# Patient Record
Sex: FEMALE | Race: WHITE
[De-identification: names, ages, dates, MRNs, and addresses within clinical notes are randomized per-mention and may not be internally consistent; named-entity substitution may affect disease eponyms.]

---

## 2019-12-05 ENCOUNTER — HOSPITAL ENCOUNTER (OUTPATIENT)
Dept: HOSPITAL 76 - DI | Age: 69
Discharge: HOME | End: 2019-12-05
Attending: INTERNAL MEDICINE
Payer: MEDICARE

## 2019-12-05 DIAGNOSIS — Q21.1: Primary | ICD-10-CM

## 2019-12-05 DIAGNOSIS — G47.31: ICD-10-CM

## 2019-12-05 DIAGNOSIS — J44.9: ICD-10-CM

## 2019-12-05 DIAGNOSIS — E03.9: ICD-10-CM

## 2019-12-05 DIAGNOSIS — Z87.891: ICD-10-CM

## 2019-12-05 DIAGNOSIS — M85.89: ICD-10-CM

## 2019-12-05 DIAGNOSIS — N95.8: ICD-10-CM

## 2019-12-05 DIAGNOSIS — Z12.31: Primary | ICD-10-CM

## 2019-12-05 PROCEDURE — 94729 DIFFUSING CAPACITY: CPT

## 2019-12-05 PROCEDURE — 77063 BREAST TOMOSYNTHESIS BI: CPT

## 2019-12-05 PROCEDURE — 71046 X-RAY EXAM CHEST 2 VIEWS: CPT

## 2019-12-05 PROCEDURE — 77067 SCR MAMMO BI INCL CAD: CPT

## 2019-12-05 PROCEDURE — 77080 DXA BONE DENSITY AXIAL: CPT

## 2019-12-05 PROCEDURE — 76536 US EXAM OF HEAD AND NECK: CPT

## 2019-12-05 PROCEDURE — 94010 BREATHING CAPACITY TEST: CPT

## 2019-12-06 NOTE — ULTRASOUND REPORT
Reason:  ATRIAL SEPTAL DEFECT, HYPOTHYROIDISM

Procedure Date:  12/05/2019   

Accession Number:  [id number] / [id number]                    

Procedure:  US  - Head or Neck Soft Tissue CPT Code:  

 

***Final Report***

 

 

FULL RESULT:

 

 

EXAM:

THYROID ULTRASOUND

 

EXAM DATE: 12/5/2019 09:46 AM.

 

CLINICAL HISTORY: Atrial septal defect, hypothyroidism.

 

COMPARISON: None.

 

TECHNIQUE: Real time sonographic imaging of the thyroid was performed by 

the sonographer. Multiple representative static images were saved for 

review.

 

FINDINGS:

THYROID GLAND:

Right Lobe: 2.5 x 1.2 x 0.8 cm, volume 1.1 cc. Markedly heterogeneous and 

lobular.

Right Lobe Nodules: None.

 

Left Lobe: 2.2 x 0.6 x 0.8 cm, volume 0.5 cc. Markedly heterogeneous and 

lobular.

Left Lobe Nodules: None.

 

Isthmus: 0.3 cm AP.

Isthmic Nodules:

1. Echogenic with peripheral hypoechoic rim, 0.9 x 0.9 x 0.5 cm.

 

LYMPH NODES:

No adenopathy demonstrated in the central or lateral compartment.

 

OTHER: None.

 

IMPRESSION:

1. Small heterogeneous and lobular thyroid gland mostly likely the result 

of thyroid disease and consistent with clinical history of 

hypothyroidism. 0.9 cm isthmic nodule which does not meet criteria for 

fine-needle aspiration biopsy. Therefore, continued surveillance is 

recommended with follow-up in 12-24 months.

 

Management recommendations are based on 2015 American Thyroid Association 

Management Guidelines for Adult Patients with Thyroid Nodules and 

Differentiated Thyroid Cancer.

 

[facility]

## 2019-12-06 NOTE — XRAY REPORT
Reason:  ATRIAL SEPTAL DEFECT, HYPOTHYROIDISM

Procedure Date:  12/05/2019   

Accession Number:  [id number] / [id number]                    

Procedure:  XR  - Chest 2 View X-Ray CPT Code:  29771

 

***Final Report***

 

 

FULL RESULT:

 

 

EXAM:

CHEST RADIOGRAPHY

 

EXAM DATE: 12/5/2019 10:42 AM.

 

CLINICAL HISTORY: ATRIAL SEPTAL DEFECT, HYPOTHYROIDISM.

 

COMPARISON: None.

 

TECHNIQUE: 2 views.

 

FINDINGS:

Lungs/Pleura: No focal opacities evident. No pleural effusion. No 

pneumothorax. Normal volumes.

 

Mediastinum: Heart and mediastinal contours are unremarkable.

 

Other: None.

IMPRESSION: Normal 2-view chest radiography.

 

[facility]

## 2019-12-09 NOTE — DEXA REPORT
Reason:  POSTMENOPAUSAL

Procedure Date:  12/05/2019   

Accession Number:  [id number] / [id number]                    

Procedure:  DEX - Dexa Spine and/or Hip CPT Code:  

 

***Final Report***

 

 

FULL RESULT:

 

 

EXAM: Dexa Spine and/or Hip

 

DATE: 12/5/2019 11:07 AM

 

CLINICAL HISTORY: POSTMENOPAUSAL

 

TECHNIQUE: Dual energy x-ray absorptiometry (DXA) was performed on a [employer] System.  Regions measured are the AP Spine, femoral neck, and if 

needed forearm.

 

COMPARISON: None.

 

In accordance with the International Society for Clinical Densitometry 

(ISCD) guidelines, data from previous exams may be reanalyzed using 

current recommendations and techniques.  This is done to allow a more 

accurate basis for comparison with the current study.

 

FINDINGS: The data for the lumbar spine is as follows:

 

                 BMD (g/cm/cm)         T-SCORE          Z-SCORE

 REGION

 L1                   0.899                   -1.9                   -1.5

 L2                   0.965                   -2.0                   -1.5

 L3                   1.025                   -1.5                   -1.0

 L4                   1.106                   -0.8                   -0.3

TOTAL              1.010                   -1.4                 -1.0

 

NOTE: All evaluable vertebrae are used for classification

 

The data for the hip is as follows:

 

                 BMD (g/cm/cm)         T-SCORE          Z-SCORE

 REGION

 Neck             0.704                       -2.4              -1.6

TOTAL            0.761                       -2.0            -1.5

 

NOTE: The femoral neck or total proximal femur, whichever is lowest, is 

used for classification.

 

IMPRESSION: THE WHO CLASSIFICATION BASED ON THE INTERNATIONAL REFERENCE 

STANDARD IS OSTEOPENIA.  THE FRACTURE RISK IS INCREASED.

 

RECOMMENDATION: Patients with diagnosis of osteoporosis or osteopenia 

should have regular bone mineral density assessment.  For those eligible 

for Medicare, routine testing is allowed once every 2 years.  Testing 

frequency can be increased for patients who have rapidly progressing 

disease or for those who are receiving medical therapy to restore bone 

mass.

 

COMMENT:  World Health Organization (WHO) definitions for osteoporosis 

and osteopenia:

NORMAL BMD:  T-score at -1.0 or higher, fracture risk is low

OSTEOPENIA BMD:  T-score between -1.0 and -2.5, fracture risk is 

increased.

OSTEOPOROSIS BMD:  T-score at -2.5 or lower, fracture risk is high.

 

National Osteoporosis Foundation recommends:

1. Obtain adequate dietary calcium (at least 1200 mg per day) and vitamin 

D (400-800 international units per day).

2. Participate, as appropriate, in regular weightbearing and 

muscle-strengthening exercise.

3. Avoid tobacco use and reduce alcohol and caffeine intake.

4. For more detailed information see the website at www.NOF.org.

## 2020-11-23 ENCOUNTER — HOSPITAL ENCOUNTER (OUTPATIENT)
Dept: HOSPITAL 76 - DI | Age: 70
Discharge: HOME | End: 2020-11-23
Attending: FAMILY MEDICINE
Payer: MEDICARE

## 2020-11-23 DIAGNOSIS — E04.1: Primary | ICD-10-CM

## 2020-11-23 PROCEDURE — 76536 US EXAM OF HEAD AND NECK: CPT

## 2020-11-23 NOTE — ULTRASOUND REPORT
PROCEDURE:  Head or Neck Soft Tissue

 

INDICATIONS:  THYROID NODULE

 

TECHNIQUE:  

Real-time scanning was performed of the thyroid gland, with image documentation.  

 

COMPARISON:  Thyroid ultrasound 12/5/2019

 

FINDINGS:  

Right:  Thyroid lobe measures 2.1 x 0.8 x 0.9 cm, and is homogeneous in echotexture.  

Left:  Thyroid lobe measures 2.0 x 0.5 x 0.6 cm, and is homogenous in echotexture.  

Isthmus:  3  mm thick.  

 

Nodule number:  One

Location:  Isthmus

Size:  1.0 x 0.8 x 0.5  cm compared to 0.9 x 0.9 x 0.5 cm. 

Composition:  Follow-up  

Echogenicity:  Hyperechoic   

Shape:  wider than tall.

Margins:  Smooth

Echogenic foci:  None

Total points:  1

ACR TI-RADS category:  1

 

IMPRESSION:  

1. Stable appearance of the isthmus lesion since 12/5/2019. Based on recommendations below given jazmin
gory 1 classification, no additional follow-up is recommended.

 

ACR TI-RADS definitions and recommendations:  

TI-RADS 1 (benign): 0 points.  FNA not needed. 

TI-RADS 2 (not suspicious): 2 points.  FNA not needed. 

TI-RADS 3 (mildly suspicious): 3 points. 

?  FNA if 2.5 cm or larger, follow up if 1.5 cm or larger (at 1, 3, and 5 years). 

TI-RADS 4 (moderately suspicious): 4-6 points.  

?  FNA if 1.5 cm or larger, follow up if 1 cm or larger (at 1, 2, 3, and 5 years).  

TI-RADS 5 (highly suspicious): 7 points or more.  

?  FNA if 1 cm or larger, follow up if 0.5 cm or larger (every year for 5 years).  

 

Reviewed by: Deb Nicolas MD on 11/23/2020 5:18 PM [facility]

Approved by: Deb Nicolas MD on 11/23/2020 5:18 PM [facility]

 

 

Station ID:  [id number]

## 2021-11-23 ENCOUNTER — HOSPITAL ENCOUNTER (OUTPATIENT)
Dept: HOSPITAL 76 - SDS | Age: 71
Discharge: HOME | End: 2021-11-23
Attending: SURGERY
Payer: MEDICARE

## 2021-11-23 VITALS — DIASTOLIC BLOOD PRESSURE: 80 MMHG | SYSTOLIC BLOOD PRESSURE: 160 MMHG

## 2021-11-23 DIAGNOSIS — K21.9: ICD-10-CM

## 2021-11-23 DIAGNOSIS — E78.5: ICD-10-CM

## 2021-11-23 DIAGNOSIS — E66.9: ICD-10-CM

## 2021-11-23 DIAGNOSIS — G89.4: ICD-10-CM

## 2021-11-23 DIAGNOSIS — Z79.84: ICD-10-CM

## 2021-11-23 DIAGNOSIS — K57.30: ICD-10-CM

## 2021-11-23 DIAGNOSIS — K59.00: ICD-10-CM

## 2021-11-23 DIAGNOSIS — Z79.01: ICD-10-CM

## 2021-11-23 DIAGNOSIS — I10: ICD-10-CM

## 2021-11-23 DIAGNOSIS — Z86.010: ICD-10-CM

## 2021-11-23 DIAGNOSIS — I69.951: ICD-10-CM

## 2021-11-23 DIAGNOSIS — M54.50: ICD-10-CM

## 2021-11-23 DIAGNOSIS — Z87.891: ICD-10-CM

## 2021-11-23 DIAGNOSIS — Z12.11: Primary | ICD-10-CM

## 2021-11-23 DIAGNOSIS — K64.8: ICD-10-CM

## 2021-11-23 DIAGNOSIS — Z79.899: ICD-10-CM

## 2021-11-23 DIAGNOSIS — K64.4: ICD-10-CM

## 2021-11-23 DIAGNOSIS — E11.42: ICD-10-CM

## 2021-11-23 DIAGNOSIS — Z79.891: ICD-10-CM

## 2021-11-23 NOTE — ANESTHESIA
Pre-Anesthesia VS, & Labs





- Diagnosis





colon polyps





- Procedure





Colonoscopy


Vital Signs: 





                                        











Temp Pulse Resp BP Pulse Ox


 


 36.9 C   96   10 L  189/95 H  95 


 


 11/23/21 08:28  11/23/21 08:28  11/23/21 08:28  11/23/21 08:28  11/23/21 08:28











Height: 5 ft 6 in


Weight (kg): 106.3 kg


Body Mass Index: 37.8


BMI Classification: Obese





- NPO


>8 hours





- Pregnancy


Is Patient Pregnant?: No





Home Medications and Allergies


Home Medications: 


Ambulatory Orders





Metformin HCl [Metformin ER Osmotic] 500 mg PO BID 11/23/21 











                                        





Atorvastatin Calcium 40 mg PO DAILY 02/12/21 


Furosemide [Lasix] 20 mg PO DAILY 02/12/21 


Levothyroxine Sodium [Levothyroxine] 150 mcg PO DAILY 02/12/21 


Methadone [Methadone Hcl] 5 mg PO BID 02/12/21 


Pregabalin [Lyrica] 150 mg PO BID 02/12/21 


Warfarin [Coumadin] 5 mg PO DAILY 02/12/21 


amLODIPine [Norvasc] 5 mg PO DAILY 02/12/21 


lisinopriL [Prinivil] 10 mg PO DAILY 02/12/21 


tiZANidine [Zanaflex] 4 mg PO BID 02/12/21 








Allergies/Adverse Reactions: 


                                    Allergies











Allergy/AdvReac Type Severity Reaction Status Date / Time


 


No Known Drug Allergies Allergy   Verified 11/23/21 08:48














Anes History & Medical History





- Anesthetic History


Anesthesia Complications: reports: No previous complications





- Medical History


Cardiovascular: reports: Hypertension


Pulmonary: reports: CPAP use


Gastrointestinal: reports: None


Urinary: reports: None


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 

outside)


Endocrine/Autoimmune: reports: Type 2 diabetes (new diagnosis)


Smoking Status: Former smoker (quit 2013)


History of Cancer?: No





- Surgical History


Gynecologic: reports: Hysterectomy, Other (laparoscopy for endometriosis)


Orthopedic: reports: Other (wrist ORIF)





Exam


Mouth Opening: Greater than 4 Fingerbreadths


Neck Mobility: Reduced


Mallampati classification: III


Thyromental Distance: 4-6 cm


Respiratory: Lungs clear


Cardiovascular: Regular rate





Plan


Anesthesia Type: Total IV


Consent for Procedure(s) Verified and Reviewed: Yes


Code Status: Attempt Resuscitation


ASA classification: 3-Severe systemic disease


Is this case an emergency?: No

## 2021-11-23 NOTE — ANESTHESIA POST OP EVALUATION
Anesthesia Post Eval





- Post Anesthesia Eval


Vitals: 





                                Last Vital Signs











Temp  36.5 C   11/23/21 09:55


 


Pulse  71   11/23/21 09:55


 


Resp  12   11/23/21 09:55


 


BP  155/80 H  11/23/21 09:55


 


Pulse Ox  96   11/23/21 09:55











CV Function Including HR & BP: Stable


Pain Control: Satisfactory


Nausea & Vomiting: Negative


Mental Status: Baseline


Respiratory Status: Airway Patent


Hydration Status: Satisfactory


Anesthesia Complications: None

## 2022-04-11 ENCOUNTER — HOSPITAL ENCOUNTER (OUTPATIENT)
Dept: HOSPITAL 76 - DI | Age: 72
Discharge: HOME | End: 2022-04-11
Attending: PHYSICIAN ASSISTANT
Payer: MEDICARE

## 2022-04-11 DIAGNOSIS — Z13.820: ICD-10-CM

## 2022-04-11 DIAGNOSIS — Z12.2: ICD-10-CM

## 2022-04-11 DIAGNOSIS — M85.89: ICD-10-CM

## 2022-04-11 DIAGNOSIS — Z78.0: Primary | ICD-10-CM

## 2022-04-11 DIAGNOSIS — Z87.891: ICD-10-CM

## 2022-04-11 DIAGNOSIS — Z12.2: Primary | ICD-10-CM

## 2022-04-11 DIAGNOSIS — N95.8: ICD-10-CM

## 2022-04-11 NOTE — DEXA REPORT
PROCEDURE: Dexa Spine and/or Hip

 

INDICATIONS: POST MENOPAUSAL

 

TECHNIQUE: Dual energy x-ray absorptiometry (DXA) was performed on a [employer] System.  Regions measur
ed are the AP Spine, femoral neck, and if needed forearm.

 

COMPARISON: December 5, 2019

  

FINDINGS:

 

Lumbar Spine: 

Bone Mineral Density   1.092 g/cm/cm,T score  -0.7, normal; 8.1% change.

 

Left Hip:

Bone Mineral Density  0.750 g/cm/cm,T score -2.0, osteopenia; -1.4% change

 

Left Femoral Neck:

Bone Mineral Density  0.712 g/cm/cm, T score -2.3, osteopenia

 

 

(T score greater or equal to -1.0: NORMAL)

(T score from -1.1 to -2.4: OSTEOPENIA)

(T score less than or equal to -2.5 to: OSTEOPOROSIS)

 

Impression: 

Bone mineral density as detailed above.

 

Patients with diagnosis of osteoporosis or osteopenia should have regular bone mineral density assess
ment.  For those eligible for Medicare, routine testing is allowed once every 2 years.  Testing frequ
ency can be increased for patients who have rapidly progressing disease or for those who are receivin
g medical therapy to restore bone mass.

 

Reviewed by: Rohan Rojas MD on 4/11/2022 2:28 PM PDT

Approved by: Rohan Rojas MD on 4/11/2022 2:28 PM PDT

 

 

Station ID:  [id number]

## 2022-04-11 NOTE — CT REPORT
PROCEDURE:  Low Dose Lung Cancer Screen

 

INDICATIONS:  FORMER SMOKER

 

TECHNIQUE:  

Noncontrast low-dose images were acquired from the pulmonary apices to the posterior costophrenic ang
les.  Multiplanar MIP reformats were then acquired.  For radiation dose reduction, the following was 
used:  automated exposure control, adjustment of mA and/or kV according to patient size.

 

COMPARISON:  None.

 

FINDINGS:  

Image quality:  Excellent.  

 

Lungs and pleura:  2 mm pleural-based pulmonary nodule, right upper lobe, image 127/5.

 

Mediastinum:  Heart size is normal.  No pericardial effusion.  Moderate coronary artery calcification
s. No mediastinal adenopathy by size criteria.  Thoracic aorta and central pulmonary arteries are nor
mal in size.  Esophagus is normal in caliber.  No hiatal hernia.  

 

Bones and chest wall:  No suspicious bony lesions.  No vertebral body compression fractures.  No axil
brennon or supraclavicular adenopathy by size criteria.  Thyroid is not well seen.

 

Abdomen:  Visualized upper abdomen solid organs and bowel loops appear normal in the absence of contr
ast.  

 

IMPRESSION:  

 

1. LungRads category 1: Negative. No nodules and/or definitely benign nodules.

 

2. Annual low-dose noncontrast CT of the chest is recommended for lung cancer screening.

 

3. Clinically significant or potentially clinically significant findings (nonlung cancer): Moderate c
oronary artery calcifications.

 

 

CLINICAL RECOMMENDATION STATEMENTS:

In patients <35 years with an ITN detected on CT, MRI, or extrathyroidal ultrasound, the Committee re
commends further evaluation with dedicated thyroid ultrasound if the nodule is "e1 cm and has no susp
icious imaging features, and if the patient has normal life expectancy.

In patients "e35 years with an ITN detected on CT, MRI, or extrathyroidal ultrasound, the Committee r
ecommends further evaluation with dedicated thyroid ultrasound if the nodule is "e1.5 cm and has no s
uspicious imaging features, and if the patient has normal life expectancy. (ACR, 2014)

 

Reviewed by: David Gutierrez MD on 4/11/2022 2:42 PM PDT

Approved by: David Gutierrez MD on 4/11/2022 2:42 PM PDT

 

 

Station ID:  [id number]

## 2022-05-31 ENCOUNTER — HOSPITAL ENCOUNTER (OUTPATIENT)
Dept: HOSPITAL 76 - DI.N | Age: 72
Discharge: HOME | End: 2022-05-31
Attending: PHYSICIAN ASSISTANT
Payer: MEDICARE

## 2022-05-31 DIAGNOSIS — R92.8: ICD-10-CM

## 2022-05-31 DIAGNOSIS — Z12.31: Primary | ICD-10-CM

## 2022-06-01 NOTE — MAMMOGRAPHY REPORT
BILATERAL DIGITAL SCREENING MAMMOGRAM 3D/2D: 5/31/2022

CLINICAL: Routine screening.  

 

Comparison is made to exams dated:  12/5/2019 mammogram and 8/30/2018 mammogram - [facility].  The tissue of both breasts is predominantly fatty.  

 

No significant masses, calcifications, or other findings are seen in either breast.  

 

There is a new focal asymmetry in the right breast at the 11:00 position.

 

There are three new asymmetries in the left breast. 

IMPRESSION: INCOMPLETE: NEEDS ADDITIONAL IMAGING EVALUATION

New bilateral asymmetries, for which diagnostic mammogram and ultrasound are recommended.

 

 

This exam was interpreted at Station ID: [id number].  

 

NOTE: For mammograms, a report in lay terms will be sent to the patient. Approximately 15% of breast 
malignancies will not be visualized mammographically. In the management of a palpable breast mass, a 
negative mammogram must not discourage biopsy of a clinically suspicious lesion.

 

Electronically Signed By: Frankie Avila M.D.          

jr/:5/31/2022 15:17:34  

 

 

 

ACR BI-RADS Category 0: Incomplete 3340F

PARENCHYMAL PATTERN: (F) - The breast(s) demonstrate(s) diffuse fatty replacement.

BI-RADS CATEGORY: (0) - 0

Mammo and US

[id number]

Immediate follow-up

LATERALITY: (B)

## 2023-02-15 ENCOUNTER — HOSPITAL ENCOUNTER (OUTPATIENT)
Dept: HOSPITAL 76 - DI | Age: 73
Discharge: HOME | End: 2023-02-15
Attending: STUDENT IN AN ORGANIZED HEALTH CARE EDUCATION/TRAINING PROGRAM
Payer: MEDICARE

## 2023-02-15 DIAGNOSIS — N63.20: ICD-10-CM

## 2023-02-15 DIAGNOSIS — N63.10: ICD-10-CM

## 2023-02-15 DIAGNOSIS — R92.8: Primary | ICD-10-CM

## 2023-02-16 NOTE — MAMMOGRAPHY REPORT
BILATERAL DIGITAL DIAGNOSTIC MAMMOGRAM 3D/2D: 2/15/2023

CLINICAL: Patient returns for a 6 month follow up of bilateral breasts.  

 

Comparison is made to exams dated:  7/14/2022 mammogram, 5/31/2022 mammogram, 12/5/2019 mammogram, an
d 8/30/2018 mammogram - [facility].  

 

Both breasts are almost entirely fatty (category a/<25% glandular tissue).  

 

There is a stable focal asymmetry in the right breast middle depth central to the nipple seen on the 
craniocaudal view only.  

There are multiple stable focal asymmetries in the left breast inferior lateral quadrant middle depth
.  

 

In retrospect, these bilateral findings are not significantly changed from the 2019 mammogram.

 

No other significant masses or calcifications are seen in either breast.  

 

IMPRESSION: BENIGN

There is no mammographic evidence of malignancy. A 1 year screening mammogram is recommended.  

 

Based on the Tyrer Cuzick model (a risk assessment model) the patients lifetime risk is 2.2% and her
 10 year risk is 1.4%. According to the ACR, ACS, and NCCN guidelines, an annual breast MRI exam chelsey
g with mammogram is recommended if the patients lifetime risk is 20% or greater.

 

 

This exam was interpreted at Station ID: [id number].  

 

NOTE: For mammograms, a report in lay terms will be sent to the patient. Approximately 15% of breast 
malignancies will not be visualized mammographically. In the management of a palpable breast mass, a 
negative mammogram must not discourage biopsy of a clinically suspicious lesion.

 

Electronically Signed By: Amber Dawson M.D.          

lk/:2/15/2023 10:52:10  

 

 

 

ACR BI-RADS Category 2: Benign Finding(s) 3342F

PARENCHYMAL PATTERN: (F) - The breast(s) demonstrate(s) diffuse fatty replacement.

BI-RADS CATEGORY: (2) - 2

RECOMMENDATION: (ANNUAL)  - Recommend routine annual screening mammography.

[id number]

1 year screening

LATERALITY: (B)

## 2023-05-07 ENCOUNTER — HOSPITAL ENCOUNTER (INPATIENT)
Dept: HOSPITAL 76 - ED | Age: 73
LOS: 10 days | Discharge: HOSPICE | DRG: 436 | End: 2023-05-17
Attending: INTERNAL MEDICINE | Admitting: INTERNAL MEDICINE
Payer: MEDICARE

## 2023-05-07 DIAGNOSIS — R09.02: ICD-10-CM

## 2023-05-07 DIAGNOSIS — Z87.891: ICD-10-CM

## 2023-05-07 DIAGNOSIS — Z79.899: ICD-10-CM

## 2023-05-07 DIAGNOSIS — Z20.822: ICD-10-CM

## 2023-05-07 DIAGNOSIS — C80.1: ICD-10-CM

## 2023-05-07 DIAGNOSIS — I45.10: ICD-10-CM

## 2023-05-07 DIAGNOSIS — E86.0: ICD-10-CM

## 2023-05-07 DIAGNOSIS — Z79.84: ICD-10-CM

## 2023-05-07 DIAGNOSIS — R53.1: ICD-10-CM

## 2023-05-07 DIAGNOSIS — E03.9: ICD-10-CM

## 2023-05-07 DIAGNOSIS — C78.7: ICD-10-CM

## 2023-05-07 DIAGNOSIS — R16.0: ICD-10-CM

## 2023-05-07 DIAGNOSIS — E11.42: ICD-10-CM

## 2023-05-07 DIAGNOSIS — I10: ICD-10-CM

## 2023-05-07 DIAGNOSIS — R41.82: ICD-10-CM

## 2023-05-07 DIAGNOSIS — G89.29: ICD-10-CM

## 2023-05-07 DIAGNOSIS — Z79.890: ICD-10-CM

## 2023-05-07 DIAGNOSIS — E11.9: ICD-10-CM

## 2023-05-07 DIAGNOSIS — G47.33: ICD-10-CM

## 2023-05-07 DIAGNOSIS — Z66: ICD-10-CM

## 2023-05-07 DIAGNOSIS — R74.01: ICD-10-CM

## 2023-05-07 DIAGNOSIS — N17.9: ICD-10-CM

## 2023-05-07 DIAGNOSIS — I95.1: ICD-10-CM

## 2023-05-07 DIAGNOSIS — R79.1: ICD-10-CM

## 2023-05-07 DIAGNOSIS — Z79.01: ICD-10-CM

## 2023-05-07 DIAGNOSIS — R10.9: Primary | ICD-10-CM

## 2023-05-07 DIAGNOSIS — G93.40: ICD-10-CM

## 2023-05-07 DIAGNOSIS — I69.351: ICD-10-CM

## 2023-05-07 LAB
ALBUMIN DIAFP-MCNC: 3 G/DL (ref 3.2–5.5)
ALBUMIN/GLOB SERPL: 0.7 {RATIO} (ref 1–2.2)
ALP SERPL-CCNC: 311 IU/L (ref 42–121)
ALT SERPL W P-5'-P-CCNC: 74 IU/L (ref 10–60)
AMPHET UR QL SCN: NEGATIVE
ANION GAP SERPL CALCULATED.4IONS-SCNC: 18 MMOL/L (ref 6–13)
AST SERPL W P-5'-P-CCNC: 206 IU/L (ref 10–42)
B PARAPERT DNA SPEC QL NAA+PROBE: NOT DETECTED
B PERT DNA SPEC QL NAA+PROBE: NOT DETECTED
BARBITURATES UR QL SCN>300 NG/ML: NEGATIVE
BASE EXCESS BLDV CALC-SCNC: -2.4 MMOL/L
BASOPHILS NFR BLD AUTO: 0.1 10^3/UL (ref 0–0.1)
BASOPHILS NFR BLD AUTO: 0.6 %
BENZODIAZ UR QL SCN: NEGATIVE
BILIRUB BLD-MCNC: 1.7 MG/DL (ref 0.2–1)
BUN SERPL-MCNC: 41 MG/DL (ref 6–20)
C PNEUM DNA NPH QL NAA+NON-PROBE: NOT DETECTED
CALCIUM UR-MCNC: 8.7 MG/DL (ref 8.5–10.3)
CHLORIDE SERPL-SCNC: 96 MMOL/L (ref 101–111)
CLARITY UR REFRACT.AUTO: (no result)
CO2 BLDV-SCNC: 24.8 MMOL/L (ref 24–29)
CO2 SERPL-SCNC: 26 MMOL/L (ref 21–32)
COCAINE UR-SCNC: NEGATIVE UMOL/L
CREAT SERPLBLD-SCNC: 2.3 MG/DL (ref 0.4–1)
EOSINOPHIL # BLD AUTO: 0 10^3/UL (ref 0–0.7)
EOSINOPHIL NFR BLD AUTO: 0 %
ERYTHROCYTE [DISTWIDTH] IN BLOOD BY AUTOMATED COUNT: 14.4 % (ref 12–15)
ETHANOL BLD-MCNC: < 5 MG/DL
FLUAV RNA RESP QL NAA+PROBE: NOT DETECTED
GFRSERPLBLD MDRD-ARVRAT: 21 ML/MIN/{1.73_M2} (ref 89–?)
GLOBULIN SER-MCNC: 4.1 G/DL (ref 2.1–4.2)
GLUCOSE SERPL-MCNC: 209 MG/DL (ref 70–100)
GLUCOSE UR QL STRIP.AUTO: NEGATIVE MG/DL
HAEM INFLU B DNA SPEC QL NAA+PROBE: NOT DETECTED
HCO3 BLDMV-SCNC: 23.4 MMOL/L (ref 23–28)
HCOV 229E RNA SPEC QL NAA+PROBE: NOT DETECTED
HCOV HKU1 RNA UPPER RESP QL NAA+PROBE: NOT DETECTED
HCOV NL63 RNA ASPIRATE QL NAA+PROBE: NOT DETECTED
HCOV OC43 RNA SPEC QL NAA+PROBE: NOT DETECTED
HCT VFR BLD AUTO: 38.8 % (ref 37–47)
HGB UR QL STRIP: 12.3 G/DL (ref 12–16)
HMPV AG SPEC QL: NOT DETECTED
HPIV1 RNA NPH QL NAA+PROBE: NOT DETECTED
HPIV2 SPEC QL CULT: NOT DETECTED
HPIV3 AB TITR SER CF: NOT DETECTED {TITER}
HPIV4 RNA SPEC QL NAA+PROBE: NOT DETECTED
INR PPP: > 10 (ref 0.8–1.2)
KETONES UR QL STRIP.AUTO: NEGATIVE MG/DL
LYMPHOCYTES # SPEC AUTO: 0.7 10^3/UL (ref 1.5–3.5)
LYMPHOCYTES NFR BLD AUTO: 5.6 %
M PNEUMO DNA SPEC QL NAA+PROBE: NOT DETECTED
MCH RBC QN AUTO: 30.1 PG (ref 27–31)
MCHC RBC AUTO-ENTMCNC: 31.7 G/DL (ref 32–36)
MCV RBC AUTO: 94.9 FL (ref 81–99)
METHADONE UR QL SCN: POSITIVE
METHAMPHET UR QL SCN: NEGATIVE
MONOCYTES # BLD AUTO: 1 10^3/UL (ref 0–1)
MONOCYTES NFR BLD AUTO: 8.7 %
NEUTROPHILS # BLD AUTO: 10 10^3/UL (ref 1.5–6.6)
NEUTROPHILS # SNV AUTO: 11.9 X10^3/UL (ref 4.8–10.8)
NEUTROPHILS NFR BLD AUTO: 84.4 %
NITRITE UR QL STRIP.AUTO: NEGATIVE
NRBC # BLD AUTO: 0 /100WBC
NRBC # BLD AUTO: 0 X10^3/UL
OPIATES UR QL SCN: NEGATIVE
PCO2 BLDV: 44.1 MMHG (ref 41–51)
PDW BLD AUTO: 11.3 FL (ref 7.9–10.8)
PH BLDV: 7.34 [PH] (ref 7.31–7.41)
PH UR STRIP.AUTO: 5.5 PH (ref 5–7.5)
PLAT MORPH BLD: (no result)
PLATELET # BLD: 290 10^3/UL (ref 130–450)
PLATELET BLD QL SMEAR: (no result)
PO2 BLDV: 45.6 MMHG (ref 25–47)
POTASSIUM SERPL-SCNC: 3.6 MMOL/L (ref 3.5–5)
PROT SPEC-MCNC: 7.1 G/DL (ref 6.7–8.2)
PROT UR STRIP.AUTO-MCNC: 100 MG/DL
PROTHROM ACT/NOR PPP: 112.3 SECS (ref 9.9–12.6)
RBC # UR STRIP.AUTO: (no result) /UL
RBC # URNS HPF: (no result) /HPF (ref 0–5)
RBC MAR: 4.09 10^6/UL (ref 4.2–5.4)
RBC MORPH BLD: (no result)
RSV RNA RESP QL NAA+PROBE: NOT DETECTED
RV+EV RNA SPEC QL NAA+PROBE: NOT DETECTED
SAO2 DF BLDV: 78.9 % (ref 60–80)
SARS-COV-2 RNA PNL SPEC NAA+PROBE: NOT DETECTED
SODIUM SERPLBLD-SCNC: 140 MMOL/L (ref 135–145)
SP GR UR STRIP.AUTO: 1.02 (ref 1–1.03)
SQUAMOUS URNS QL MICRO: (no result)
THC UR QL SCN: NEGATIVE
UROBILINOGEN UR QL STRIP.AUTO: (no result) E.U./DL
UROBILINOGEN UR STRIP.AUTO-MCNC: NEGATIVE MG/DL
VOLATILE DRUGS POS SERPL SCN: (no result)
WBC # UR MANUAL: (no result) /HPF (ref 0–5)
WBC MORPH BLD: (no result)

## 2023-05-07 PROCEDURE — 71260 CT THORAX DX C+: CPT

## 2023-05-07 PROCEDURE — 82803 BLOOD GASES ANY COMBINATION: CPT

## 2023-05-07 PROCEDURE — 82140 ASSAY OF AMMONIA: CPT

## 2023-05-07 PROCEDURE — 84132 ASSAY OF SERUM POTASSIUM: CPT

## 2023-05-07 PROCEDURE — 36415 COLL VENOUS BLD VENIPUNCTURE: CPT

## 2023-05-07 PROCEDURE — 70450 CT HEAD/BRAIN W/O DYE: CPT

## 2023-05-07 PROCEDURE — 97163 PT EVAL HIGH COMPLEX 45 MIN: CPT

## 2023-05-07 PROCEDURE — 82105 ALPHA-FETOPROTEIN SERUM: CPT

## 2023-05-07 PROCEDURE — 84484 ASSAY OF TROPONIN QUANT: CPT

## 2023-05-07 PROCEDURE — 80306 DRUG TEST PRSMV INSTRMNT: CPT

## 2023-05-07 PROCEDURE — 83036 HEMOGLOBIN GLYCOSYLATED A1C: CPT

## 2023-05-07 PROCEDURE — 93005 ELECTROCARDIOGRAM TRACING: CPT

## 2023-05-07 PROCEDURE — 87086 URINE CULTURE/COLONY COUNT: CPT

## 2023-05-07 PROCEDURE — 81001 URINALYSIS AUTO W/SCOPE: CPT

## 2023-05-07 PROCEDURE — 87077 CULTURE AEROBIC IDENTIFY: CPT

## 2023-05-07 PROCEDURE — 97116 GAIT TRAINING THERAPY: CPT

## 2023-05-07 PROCEDURE — 83880 ASSAY OF NATRIURETIC PEPTIDE: CPT

## 2023-05-07 PROCEDURE — 71045 X-RAY EXAM CHEST 1 VIEW: CPT

## 2023-05-07 PROCEDURE — 86304 IMMUNOASSAY TUMOR CA 125: CPT

## 2023-05-07 PROCEDURE — 83735 ASSAY OF MAGNESIUM: CPT

## 2023-05-07 PROCEDURE — 86301 IMMUNOASSAY TUMOR CA 19-9: CPT

## 2023-05-07 PROCEDURE — 36556 INSERT NON-TUNNEL CV CATH: CPT

## 2023-05-07 PROCEDURE — 51702 INSERT TEMP BLADDER CATH: CPT

## 2023-05-07 PROCEDURE — 87040 BLOOD CULTURE FOR BACTERIA: CPT

## 2023-05-07 PROCEDURE — 74170 CT ABD WO CNTRST FLWD CNTRST: CPT

## 2023-05-07 PROCEDURE — 87150 DNA/RNA AMPLIFIED PROBE: CPT

## 2023-05-07 PROCEDURE — 80320 DRUG SCREEN QUANTALCOHOLS: CPT

## 2023-05-07 PROCEDURE — 97167 OT EVAL HIGH COMPLEX 60 MIN: CPT

## 2023-05-07 PROCEDURE — 87633 RESP VIRUS 12-25 TARGETS: CPT

## 2023-05-07 PROCEDURE — 83605 ASSAY OF LACTIC ACID: CPT

## 2023-05-07 PROCEDURE — 86300 IMMUNOASSAY TUMOR CA 15-3: CPT

## 2023-05-07 PROCEDURE — 97530 THERAPEUTIC ACTIVITIES: CPT

## 2023-05-07 PROCEDURE — 86901 BLOOD TYPING SEROLOGIC RH(D): CPT

## 2023-05-07 PROCEDURE — 84439 ASSAY OF FREE THYROXINE: CPT

## 2023-05-07 PROCEDURE — 96374 THER/PROPH/DIAG INJ IV PUSH: CPT

## 2023-05-07 PROCEDURE — 96361 HYDRATE IV INFUSION ADD-ON: CPT

## 2023-05-07 PROCEDURE — 76705 ECHO EXAM OF ABDOMEN: CPT

## 2023-05-07 PROCEDURE — 97112 NEUROMUSCULAR REEDUCATION: CPT

## 2023-05-07 PROCEDURE — 87181 SC STD AGAR DILUTION PER AGT: CPT

## 2023-05-07 PROCEDURE — 97535 SELF CARE MNGMENT TRAINING: CPT

## 2023-05-07 PROCEDURE — 84100 ASSAY OF PHOSPHORUS: CPT

## 2023-05-07 PROCEDURE — 85610 PROTHROMBIN TIME: CPT

## 2023-05-07 PROCEDURE — 84443 ASSAY THYROID STIM HORMONE: CPT

## 2023-05-07 PROCEDURE — 80053 COMPREHEN METABOLIC PANEL: CPT

## 2023-05-07 PROCEDURE — 74177 CT ABD & PELVIS W/CONTRAST: CPT

## 2023-05-07 PROCEDURE — 86900 BLOOD TYPING SEROLOGIC ABO: CPT

## 2023-05-07 PROCEDURE — 82330 ASSAY OF CALCIUM: CPT

## 2023-05-07 PROCEDURE — 80048 BASIC METABOLIC PNL TOTAL CA: CPT

## 2023-05-07 PROCEDURE — 99285 EMERGENCY DEPT VISIT HI MDM: CPT

## 2023-05-07 PROCEDURE — 82378 CARCINOEMBRYONIC ANTIGEN: CPT

## 2023-05-07 PROCEDURE — 74176 CT ABD & PELVIS W/O CONTRAST: CPT

## 2023-05-07 PROCEDURE — 85025 COMPLETE CBC W/AUTO DIFF WBC: CPT

## 2023-05-07 PROCEDURE — 99291 CRITICAL CARE FIRST HOUR: CPT

## 2023-05-07 RX ADMIN — INSULIN LISPRO SCH UNIT: 100 INJECTION, SOLUTION INTRAVENOUS; SUBCUTANEOUS at 22:09

## 2023-05-07 RX ADMIN — SODIUM CHLORIDE, PRESERVATIVE FREE SCH ML: 5 INJECTION INTRAVENOUS at 21:27

## 2023-05-07 RX ADMIN — SODIUM CHLORIDE SCH MLS/HR: 9 INJECTION, SOLUTION INTRAVENOUS at 21:27

## 2023-05-07 NOTE — XRAY REPORT
PROCEDURE:  Chest 1 View X-Ray

 

INDICATIONS:  HYPOXEMIA

 

TECHNIQUE:  One view of the chest was acquired.  

 

COMPARISON:  Chest x-ray 10/17/2022

 

FINDINGS:  

 

Surgical changes and devices:  None.  

 

Lungs and pleura:  No pleural effusions or pneumothorax.  Lungs are clear.  

 

Mediastinum:  Mediastinal contours appear normal.  Heart size is mildly enlarged..  

 

Bones and chest wall:  No suspicious bony lesions.  Overlying soft tissues appear unremarkable.  

 

 

IMPRESSION:  

 

No acute cardiopulmonary process.

 

 

 

Reviewed by: Deb Nicolas MD on 5/7/2023 6:28 PM PDT

Approved by: Deb Nicolas MD on 5/7/2023 6:28 PM PDT

 

 

Station ID:  [id number]

## 2023-05-07 NOTE — ED PHYSICIAN DOCUMENTATION
PD HPI ALTERED MENTAL STATUS





- Stated complaint


Stated Complaint: DEHYDRATED,WEAK,ABD PX





- Chief complaint


Chief Complaint: Neuro





- History obtained from


History obtained from: Family





- Additional information


Additional information: 





72-year-old woman with history of strokes, septal defect on lifelong warfarin, 

type 2 diabetes, peripheral neuropathy, hysterectomy presents for the evaluation

of abdominal pain and altered mental status.  She presents by private vehicle 

accompanied by her .  He provides all the history because she is fairly 

encephalopathic.  He states that about 4 to 5 days ago started to complain of 

diarrhea and abdominal pain.  Had a fever at the beginning but not in the last 

couple of days.  Today she became progressively confused and has not been 

talking in the last few hours.





PD PAST MEDICAL HISTORY





- Past Medical History


Cardiovascular: Hypertension


Respiratory: CPAP use


Neuro: CVA (times three right sided weakness, uses cane when walks outside)


Endocrine/Autoimmune: Type 2 diabetes (new diagnosis)


GI: None


: None


Musculoskeletal: Chronic back pain





- Past Surgical History


General: Other


Ortho: Other (wrist ORIF)


/GYN: Hysterectomy, Other (laparoscopy for endometriosis)





- Present Medications


Home Medications: 


                                Ambulatory Orders











 Medication  Instructions  Recorded  Confirmed


 


Atorvastatin Calcium 40 mg PO DAILY 02/12/21 11/23/21


 


Furosemide [Lasix] 20 mg PO DAILY 02/12/21 11/23/21


 


Levothyroxine Sodium 150 mcg PO DAILY 02/12/21 11/23/21





[Levothyroxine]   


 


Methadone [Methadone Hcl] 5 mg PO BID 02/12/21 11/23/21


 


Pregabalin [Lyrica] 150 mg PO BID 02/12/21 11/23/21


 


Warfarin [Coumadin] 5 mg PO DAILY 02/12/21 11/23/21


 


amLODIPine [Norvasc] 5 mg PO DAILY 02/12/21 11/23/21


 


lisinopriL [Prinivil] 10 mg PO DAILY 02/12/21 11/23/21


 


tiZANidine [Zanaflex] 4 mg PO BID 02/12/21 11/23/21


 


Metformin HCl [Metformin  mg PO BID 11/23/21 11/23/21





Osmotic]   


 


Ondansetron Odt [Zofran] 4 mg TL Q6H PRN #10 tablet 10/17/22 














- Allergies


Allergies/Adverse Reactions: 


                                    Allergies











Allergy/AdvReac Type Severity Reaction Status Date / Time


 


No Known Drug Allergies Allergy   Verified 10/17/22 14:56














- Social History


Smoking Status: Former smoker (quit 2013)





PD ED PE NORMAL





- Vitals


Vital signs reviewed: Yes





- General


General: Other (I am able to get her to respond, she has slow simple answers to 

questions and is alert and oriented to person only.  She endorses mild abdominal

 pain as her only complaint.)





- HEENT


HEENT: PERRL, EOMI





- Neck


Neck: Supple, no meningeal sign, No bony TTP





- Cardiac


Cardiac: RRR, No murmur





- Respiratory


Respiratory: No respiratory distress, Clear bilaterally (Anteriorly)





- Abdomen


Abdomen: Other (Mild diffuse tenderness with more significant right upper 

quadrant tenderness)





- Derm


Derm: Normal color, Warm and dry





- Extremities


Extremities: No edema, No calf tenderness / cord





- Neuro


Neuro: No motor deficit, No sensory deficit


Eye Opening: Spontaneous


Motor: Obeys Commands


Verbal: Confused


GCS Score: 14





Results





- Vitals


Vitals: 


                               Vital Signs - 24 hr











  05/07/23 05/07/23 05/07/23





  17:55 17:57 18:27


 


Temperature 37 C 37.0 C 


 


Heart Rate 109 H 109 H 88


 


Respiratory 16 16 14





Rate   


 


Blood Pressure 141/120 H 141/120 H 152/100 H


 


O2 Saturation 89 L 89 L 96


 


If not protocol   2





: Oxygen Flow,   





liters/minute   














  05/07/23 05/07/23 05/07/23





  18:30 18:35 19:35


 


Temperature   


 


Heart Rate 82 82 73


 


Respiratory 14 12 17





Rate   


 


Blood Pressure 100/50 L 100/50 L 99/70


 


O2 Saturation 98 99 96


 


If not protocol 2  





: Oxygen Flow,   





liters/minute   








                                     Oxygen











O2 Source                      Room air


 


Oxygen Flow Rate               2

















- EKG (time done)


  ** 1901


EKG releavant findings:: 


EKG personally interpreted by author of this note. Relevant findings are: 





Rate: Rate (enter#) (81)


Rhythm: NSR


Axis: Normal


Intervals: RBBB


Ischemia: Normal ST segments


Compare to prior EKG: Old EKG unavailable


Computer interpretation: Agree with computer





- Labs


Labs: 


                                Laboratory Tests











  05/07/23 05/07/23 05/07/23





  18:36 18:39 18:39


 


WBC   11.9 H 


 


RBC   4.09 L 


 


Hgb   12.3 


 


Hct   38.8 


 


MCV   94.9 


 


MCH   30.1 


 


MCHC   31.7 L 


 


RDW   14.4 


 


Plt Count   290 


 


MPV   11.3 H 


 


Neut # (Auto)   10.0 H 


 


Lymph # (Auto)   0.7 L 


 


Mono # (Auto)   1.0 


 


Eos # (Auto)   0.0 


 


Baso # (Auto)   0.1 


 


Absolute Nucleated RBC   0.00 


 


Nucleated RBC %   0.0 


 


Manual Slide Review   Indicated 


 


WBC Morphology   NORMAL APPEARANCE 


 


Platelet Estimate   NORMAL (130-450,000) 


 


Platelet Morphology   NORMAL APPEARANCE 


 


RBC Morph Micro Appear   NORMAL APPEARANCE 


 


PT   


 


INR   


 


VBG pH   


 


VBG pCO2   


 


VBG pO2   


 


VBG HCO3   


 


VBG Total CO2   


 


VBG O2 Saturation   


 


VBG Base Excess   


 


Sodium    140


 


Potassium    3.6


 


Chloride    96 L


 


Carbon Dioxide    26


 


Anion Gap    18.0 H


 


BUN    41 H


 


Creatinine    2.3 H


 


Estimated GFR (MDRD)    21 L


 


Glucose    209 H


 


Lactic Acid   


 


Calcium    8.7


 


Total Bilirubin    1.7 H


 


AST    206 H


 


ALT    74 H


 


Alkaline Phosphatase    311 H


 


Total Protein    7.1


 


Albumin    3.0 L


 


Globulin    4.1


 


Albumin/Globulin Ratio    0.7 L


 


TSH   


 


Urine Color   


 


Urine Clarity   


 


Urine pH   


 


Ur Specific Gravity   


 


Urine Protein   


 


Urine Glucose (UA)   


 


Urine Ketones   


 


Urine Occult Blood   


 


Urine Nitrite   


 


Urine Bilirubin   


 


Urine Urobilinogen   


 


Ur Leukocyte Esterase   


 


Urine RBC   


 


Urine WBC   


 


Ur Squamous Epith Cells   


 


Amorphous Sediment   


 


Urine Bacteria   


 


Urine Culture Comments   


 


Nasal Adenovirus (PCR)  NOT DETECTED  


 


Nasal B. parapertussis DNA (PCR)  NOT DETECTED  


 


Nasal Coronavir 229E PCR  NOT DETECTED  


 


Nasal Coronavir HKU1 PCR  NOT DETECTED  


 


Nasal Coronavir NL63 PCR  NOT DETECTED  


 


Nasal Coronavir OC43 PCR  NOT DETECTED  


 


Nasal Enterovir/Rhinovir PCR  NOT DETECTED  


 


Nasal Influenza B PCR  NOT DETECTED  


 


Nasal Influenza A PCR  NOT DETECTED  


 


Nasal Parainfluen 1 PCR  NOT DETECTED  


 


Nasal Parainfluen 2 PCR  NOT DETECTED  


 


Nasal Parainfluen 3 PCR  NOT DETECTED  


 


Nasal Parainfluen 4 PCR  NOT DETECTED  


 


Nasal RSV (PCR)  NOT DETECTED  


 


Nasal B.pertussis DNA PCR  NOT DETECTED  


 


Nasal C.pneumoniae (PCR)  NOT DETECTED  


 


Tab Human Metapneumo PCR  NOT DETECTED  


 


Nasal M.pneumoniae (PCR)  NOT DETECTED  


 


Nasal SARS-CoV-2 (PCR)  NOT DETECTED  


 


Urine Opiates Screen   


 


Ur Oxycodone Screen   


 


Urine Methadone Screen   


 


Ur Propoxyphene Screen   


 


Ur Barbiturates Screen   


 


Ur Tricyclics Screen   


 


Ur Phencyclidine Scrn   


 


Ur Amphetamine Screen   


 


U Methamphetamines Scrn   


 


U Benzodiazepines Scrn   


 


Urine Cocaine Screen   


 


U Cannabinoids Screen   


 


Ethyl Alcohol    < 5.0


 


Blood Type   


 


Blood Type Recheck   














  05/07/23 05/07/23 05/07/23





  18:39 18:39 18:39


 


WBC   


 


RBC   


 


Hgb   


 


Hct   


 


MCV   


 


MCH   


 


MCHC   


 


RDW   


 


Plt Count   


 


MPV   


 


Neut # (Auto)   


 


Lymph # (Auto)   


 


Mono # (Auto)   


 


Eos # (Auto)   


 


Baso # (Auto)   


 


Absolute Nucleated RBC   


 


Nucleated RBC %   


 


Manual Slide Review   


 


WBC Morphology   


 


Platelet Estimate   


 


Platelet Morphology   


 


RBC Morph Micro Appear   


 


PT   112.3 H 


 


INR   > 10.0 H* 


 


VBG pH   


 


VBG pCO2   


 


VBG pO2   


 


VBG HCO3   


 


VBG Total CO2   


 


VBG O2 Saturation   


 


VBG Base Excess   


 


Sodium   


 


Potassium   


 


Chloride   


 


Carbon Dioxide   


 


Anion Gap   


 


BUN   


 


Creatinine   


 


Estimated GFR (MDRD)   


 


Glucose   


 


Lactic Acid  1.9  


 


Calcium   


 


Total Bilirubin   


 


AST   


 


ALT   


 


Alkaline Phosphatase   


 


Total Protein   


 


Albumin   


 


Globulin   


 


Albumin/Globulin Ratio   


 


TSH    7.06 H


 


Urine Color   


 


Urine Clarity   


 


Urine pH   


 


Ur Specific Gravity   


 


Urine Protein   


 


Urine Glucose (UA)   


 


Urine Ketones   


 


Urine Occult Blood   


 


Urine Nitrite   


 


Urine Bilirubin   


 


Urine Urobilinogen   


 


Ur Leukocyte Esterase   


 


Urine RBC   


 


Urine WBC   


 


Ur Squamous Epith Cells   


 


Amorphous Sediment   


 


Urine Bacteria   


 


Urine Culture Comments   


 


Nasal Adenovirus (PCR)   


 


Nasal B. parapertussis DNA (PCR)   


 


Nasal Coronavir 229E PCR   


 


Nasal Coronavir HKU1 PCR   


 


Nasal Coronavir NL63 PCR   


 


Nasal Coronavir OC43 PCR   


 


Nasal Enterovir/Rhinovir PCR   


 


Nasal Influenza B PCR   


 


Nasal Influenza A PCR   


 


Nasal Parainfluen 1 PCR   


 


Nasal Parainfluen 2 PCR   


 


Nasal Parainfluen 3 PCR   


 


Nasal Parainfluen 4 PCR   


 


Nasal RSV (PCR)   


 


Nasal B.pertussis DNA PCR   


 


Nasal C.pneumoniae (PCR)   


 


Tab Human Metapneumo PCR   


 


Nasal M.pneumoniae (PCR)   


 


Nasal SARS-CoV-2 (PCR)   


 


Urine Opiates Screen   


 


Ur Oxycodone Screen   


 


Urine Methadone Screen   


 


Ur Propoxyphene Screen   


 


Ur Barbiturates Screen   


 


Ur Tricyclics Screen   


 


Ur Phencyclidine Scrn   


 


Ur Amphetamine Screen   


 


U Methamphetamines Scrn   


 


U Benzodiazepines Scrn   


 


Urine Cocaine Screen   


 


U Cannabinoids Screen   


 


Ethyl Alcohol   


 


Blood Type   


 


Blood Type Recheck   














  05/07/23 05/07/23 05/07/23





  18:39 18:39 19:13


 


WBC   


 


RBC   


 


Hgb   


 


Hct   


 


MCV   


 


MCH   


 


MCHC   


 


RDW   


 


Plt Count   


 


MPV   


 


Neut # (Auto)   


 


Lymph # (Auto)   


 


Mono # (Auto)   


 


Eos # (Auto)   


 


Baso # (Auto)   


 


Absolute Nucleated RBC   


 


Nucleated RBC %   


 


Manual Slide Review   


 


WBC Morphology   


 


Platelet Estimate   


 


Platelet Morphology   


 


RBC Morph Micro Appear   


 


PT   


 


INR   


 


VBG pH  7.343  


 


VBG pCO2  44.1  


 


VBG pO2  45.6  


 


VBG HCO3  23.4  


 


VBG Total CO2  24.8  


 


VBG O2 Saturation  78.9  


 


VBG Base Excess  -2.4 L  


 


Sodium   


 


Potassium   


 


Chloride   


 


Carbon Dioxide   


 


Anion Gap   


 


BUN   


 


Creatinine   


 


Estimated GFR (MDRD)   


 


Glucose   


 


Lactic Acid   


 


Calcium   


 


Total Bilirubin   


 


AST   


 


ALT   


 


Alkaline Phosphatase   


 


Total Protein   


 


Albumin   


 


Globulin   


 


Albumin/Globulin Ratio   


 


TSH   


 


Urine Color   


 


Urine Clarity   


 


Urine pH   


 


Ur Specific Gravity   


 


Urine Protein   


 


Urine Glucose (UA)   


 


Urine Ketones   


 


Urine Occult Blood   


 


Urine Nitrite   


 


Urine Bilirubin   


 


Urine Urobilinogen   


 


Ur Leukocyte Esterase   


 


Urine RBC   


 


Urine WBC   


 


Ur Squamous Epith Cells   


 


Amorphous Sediment   


 


Urine Bacteria   


 


Urine Culture Comments   


 


Nasal Adenovirus (PCR)   


 


Nasal B. parapertussis DNA (PCR)   


 


Nasal Coronavir 229E PCR   


 


Nasal Coronavir HKU1 PCR   


 


Nasal Coronavir NL63 PCR   


 


Nasal Coronavir OC43 PCR   


 


Nasal Enterovir/Rhinovir PCR   


 


Nasal Influenza B PCR   


 


Nasal Influenza A PCR   


 


Nasal Parainfluen 1 PCR   


 


Nasal Parainfluen 2 PCR   


 


Nasal Parainfluen 3 PCR   


 


Nasal Parainfluen 4 PCR   


 


Nasal RSV (PCR)   


 


Nasal B.pertussis DNA PCR   


 


Nasal C.pneumoniae (PCR)   


 


Tab Human Metapneumo PCR   


 


Nasal M.pneumoniae (PCR)   


 


Nasal SARS-CoV-2 (PCR)   


 


Urine Opiates Screen   


 


Ur Oxycodone Screen   


 


Urine Methadone Screen   


 


Ur Propoxyphene Screen   


 


Ur Barbiturates Screen   


 


Ur Tricyclics Screen   


 


Ur Phencyclidine Scrn   


 


Ur Amphetamine Screen   


 


U Methamphetamines Scrn   


 


U Benzodiazepines Scrn   


 


Urine Cocaine Screen   


 


U Cannabinoids Screen   


 


Ethyl Alcohol   


 


Blood Type    B POSITIVE


 


Blood Type Recheck   B POSITIVE 














  05/07/23





  19:16


 


WBC 


 


RBC 


 


Hgb 


 


Hct 


 


MCV 


 


MCH 


 


MCHC 


 


RDW 


 


Plt Count 


 


MPV 


 


Neut # (Auto) 


 


Lymph # (Auto) 


 


Mono # (Auto) 


 


Eos # (Auto) 


 


Baso # (Auto) 


 


Absolute Nucleated RBC 


 


Nucleated RBC % 


 


Manual Slide Review 


 


WBC Morphology 


 


Platelet Estimate 


 


Platelet Morphology 


 


RBC Morph Micro Appear 


 


PT 


 


INR 


 


VBG pH 


 


VBG pCO2 


 


VBG pO2 


 


VBG HCO3 


 


VBG Total CO2 


 


VBG O2 Saturation 


 


VBG Base Excess 


 


Sodium 


 


Potassium 


 


Chloride 


 


Carbon Dioxide 


 


Anion Gap 


 


BUN 


 


Creatinine 


 


Estimated GFR (MDRD) 


 


Glucose 


 


Lactic Acid 


 


Calcium 


 


Total Bilirubin 


 


AST 


 


ALT 


 


Alkaline Phosphatase 


 


Total Protein 


 


Albumin 


 


Globulin 


 


Albumin/Globulin Ratio 


 


TSH 


 


Urine Color  YELLOW


 


Urine Clarity  HAZY


 


Urine pH  5.5


 


Ur Specific Gravity  1.020


 


Urine Protein  100 H


 


Urine Glucose (UA)  NEGATIVE


 


Urine Ketones  NEGATIVE


 


Urine Occult Blood  LARGE H


 


Urine Nitrite  NEGATIVE


 


Urine Bilirubin  NEGATIVE


 


Urine Urobilinogen  0.2 (NORMAL)


 


Ur Leukocyte Esterase  NEGATIVE


 


Urine RBC  0-5


 


Urine WBC  0-3


 


Ur Squamous Epith Cells  RARE Squamous


 


Amorphous Sediment  Few


 


Urine Bacteria  Few


 


Urine Culture Comments  NOT INDICATED


 


Nasal Adenovirus (PCR) 


 


Nasal B. parapertussis DNA (PCR) 


 


Nasal Coronavir 229E PCR 


 


Nasal Coronavir HKU1 PCR 


 


Nasal Coronavir NL63 PCR 


 


Nasal Coronavir OC43 PCR 


 


Nasal Enterovir/Rhinovir PCR 


 


Nasal Influenza B PCR 


 


Nasal Influenza A PCR 


 


Nasal Parainfluen 1 PCR 


 


Nasal Parainfluen 2 PCR 


 


Nasal Parainfluen 3 PCR 


 


Nasal Parainfluen 4 PCR 


 


Nasal RSV (PCR) 


 


Nasal B.pertussis DNA PCR 


 


Nasal C.pneumoniae (PCR) 


 


Tab Human Metapneumo PCR 


 


Nasal M.pneumoniae (PCR) 


 


Nasal SARS-CoV-2 (PCR) 


 


Urine Opiates Screen  NEGATIVE


 


Ur Oxycodone Screen  NEGATIVE


 


Urine Methadone Screen  POSITIVE H


 


Ur Propoxyphene Screen  NEGATIVE


 


Ur Barbiturates Screen  NEGATIVE


 


Ur Tricyclics Screen  NEGATIVE


 


Ur Phencyclidine Scrn  NEGATIVE


 


Ur Amphetamine Screen  NEGATIVE


 


U Methamphetamines Scrn  NEGATIVE


 


U Benzodiazepines Scrn  NEGATIVE


 


Urine Cocaine Screen  NEGATIVE


 


U Cannabinoids Screen  NEGATIVE


 


Ethyl Alcohol 


 


Blood Type 


 


Blood Type Recheck 














- Rads (name of study)


  ** Single view chest x-ray is unremarkable


Relevant Findings:: Final report received, EMP independent interpretation of 

test





  ** Chest x-ray for line placement showing appropriate position


Relevant Findings:: Final report received, EMP independent interpretation of 

test





  ** CT of the head showing atrophy and ischemic changes, nothing acute


Relevant Findings:: Final report received, EMP independent interpretation of 

test





  ** CT done without IV contrast demonstrates likely metastatic disease or 

hepatocarcinoma in the liver


Relevant Findings:: Final report received, EMP independent interpretation of 

test





Procedures





- Central Line - Major


Central Line Preparation: Consent Obtained (from ), Time out completed, 

Ultrasound used, Sterile prep and drape


Central line location: Right IJ


Central line type: Triple lumen


Central line aftercare: Chlorhexidine disc placed, Secured, Placement confirmed,

 No pneumothorax, No complications, Bundle checklist complete, Pt tolerated well





PD Medical Decision Making





- ED course


ED course: 





72-year-old woman presents with recent history of diarrhea and abdominal pain 

and now encephalopathy today that is quite severe.  She is mildly hypoxic and 

tachycardic in triage and at times minimally responsive.  She was attended to 

immediately and all the history was from the .  Her blood sugar was 

checked and it was just a little over 200 which would not be causative for her 

encephalopathy.  She is tender in the right upper quadrant.  She was difficult 

for IV access so central line was expeditiously placed.  I had placed an 

ultrasound-guided 22-gauge IV in the right arm but given the acuity of her 

illness I felt that central venous access was appropriate.





With some IV fluids she had a perked up a bit and was more coherent.  Initial 

work-up demonstrated a CBC showing a white count of 11.9.  INR too high to 

calculate.  Basically normal venous blood gas.  CMP showing BEN with creatinine 

of 2.3 and elevated liver enzymes.  Urinalysis with blood, but no red cells.  

Urine drug screen positive for methadone which she does take.





On my initial view of her CAT scan of the abdomen, I was concerned for 

gallbladder infection given the findings above.  That said there ill-defined 

numerous hypodensities in the liver and I did a bedside ultrasound and it 

appears that she has widespread metastatic disease in the liver.  She has no 

history of primary cancer.   states she had a negative colonoscopy a few 

years ago.





Family and  as well as patient were updated with these results including 

the presumptive but unfortunate diagnosis of metastatic disease to the liver 

versus primary hepatocellular carcinoma.  This would explain her INR being up.  

She will need to come into the hospital for management of her acute kidney 

injury, supratherapeutic INR.  Eventually she will need to be considered for 

liver biopsy but that will be complicated by her ongoing warfarin use's and 

anticoagulation.  I presented the case to the telehealth hospitalist for 

admission at 8:10 PM.





An ultrasound was ordered to confirm multiple liver masses.  This was pending on

 admission.





- Critical Care


Time(min): 45


Time Includes: Direct patient care, Review records, Reassess patient, Document 

care, Coordinate care, Medical consult, Family consult for tx dec


Data interpretation: Labs, Pulse ox


Procedures included in critical care time: Peripheral IV


Procedures excluded from critical care time: Central IV, EKG





Departure





- Departure


Disposition: [facility] DC/Xfer


Clinical Impression: 


 Altered mental status, Acute kidney injury, Liver masses, Supratherapeutic INR





Condition: Serious

## 2023-05-07 NOTE — CT REPORT
PROCEDURE:  HEAD WO

 

INDICATIONS:  ams

 

TECHNIQUE:  

Noncontrast 4.5 mm thick angled axial sections acquired from the foramen magnum to the vertex.  For r
adiation dose reduction, the following was used:  automated exposure control, adjustment of mA and/or
 kV according to patient size.

 

COMPARISON:  CT head 10/17/2022

 

FINDINGS:  

Image quality:  Excellent.  

 

The ventricular system and cortical sulci demonstrate atrophy, consistent for patient's stated age.  
There are areas of hypodensity in the periventricular and subcortical white matter.  There is no acut
e intra or extra-axial fluid collection.  No acute hemorrhage, mass lesion or midline shift.  Brainst
em is unremarkable.  

Globes are symmetrical. Sinuses are aerated. Osseous structures are intact. 

 

IMPRESSION:  

1.  No acute intracranial process.

2.  Moderate atrophy and chronic microvascular ischemic changes.

 

 

 

Reviewed by: Deb Nicolas MD on 5/7/2023 7:43 PM PDT

Approved by: Deb Nicolas MD on 5/7/2023 7:43 PM PDT

 

 

Station ID:  [id number]

## 2023-05-07 NOTE — XRAY REPORT
PROCEDURE:  Chest for Line Placement

 

INDICATIONS:  Right IJ central venous catheter

 

TECHNIQUE:  One view of the chest was acquired.  

 

COMPARISON:  Chest x-ray 5/7/2023

 

FINDINGS:  

 

Surgical changes and devices:  Interval right-sided central venous catheter placement distal tip proj
ecting over the mid/distal SVC.

 

Lungs and pleura:  No pleural effusions or pneumothorax.  Lungs are clear.  

 

Mediastinum:  Mediastinal contours appear normal.  Heart size is mildly enlarged.

 

Bones and chest wall:  No suspicious bony lesions.  Overlying soft tissues appear unremarkable.  

 

 

IMPRESSION:  

 

Central venous catheter placement as above.

 

 

 

Reviewed by: Deb Nicolas MD on 5/7/2023 7:26 PM PDT

Approved by: Deb Nicolas MD on 5/7/2023 7:26 PM PDT

 

 

Station ID:  [id number]

## 2023-05-07 NOTE — CT REPORT
PROCEDURE:  ABDOMEN/PELVIS WO

 

INDICATIONS:  Abdominal pain, acute kidney injury, right upper q

 

TECHNIQUE:  

Noncontrast 5 mm thick sections acquired from the diaphragms to the symphysis.  5 mm coronal and sagi
ttal reformats were then performed.  For radiation dose reduction, the following was used:  automated
 exposure control, adjustment of mA and/or kV according to patient size.

 

COMPARISON:  None.

 

FINDINGS:  

Image quality: Excellent.

 

Lung bases and heart: Unremarkable. 

 

Liver: Noncontrast liver is diffusely heterogeneous in appearance. There is an overall hypoattenuatio
n infiltrative appearance encompassing significant portion of the right lobe. There is an overall candis
earance of nodularity both along the capsule as well as within the parenchyma. The liver is enlarged 
measuring 23.3 cm.

Gallbladder and biliary tree: The gallbladder is unremarkable. No ductal dilation.

Spleen: No splenomegaly.

Pancreas: No pancreatic ductal dilation.

Adrenals: No adrenal nodule.

Kidneys and ureters: No hydronephrosis. No renal cystic lesion which requires follow up. No solid mas
s. Mild atrophy is present.

 

Bowel and peritoneum: No bowel distension. No pathologic free fluid.

Lymph nodes: No central or retroperitoneal adenopathy.

Vessels: No infrarenal aortic aneurysm.

 

PELVIS

Reproductive organs: Unremarkable.

Bladder: No abnormal wall thickening, accounting for underdistension.

Pelvic lymph nodes: No pelvic adenopathy by size criteria.

 

Bones: No aggressive osseous abnormality.

Other: No significant ventral or inguinal hernia.

 

 

IMPRESSION:  

 

Markedly enlarged liver with infiltrative hypoattenuating appearance with areas of marked nodularity.
 Overall appearance is highly concerning for metastatic disease or multifocal nodular cirrhosis with 
potentially superimposed hepatocellular carcinoma. Further evaluation with hepatic protocol with MRI 
is recommended.

 

Kidneys demonstrate mild age-appropriate atrophy. No obstruction. Renal injury evaluation is limited 
given lack of IV contrast.

 

 

 

Reviewed by: Deb Nicolas MD on 5/7/2023 7:58 PM PDT

Approved by: Deb Nicolas MD on 5/7/2023 7:58 PM PDT

 

 

Station ID:  [id number]

## 2023-05-07 NOTE — ULTRASOUND REPORT
PROCEDURE: Abdomen Limited

 

INDICATIONS:  Right upper quadrant pain, abnormal appearing liver on CT

 

TECHNIQUE:  

Real-time focused scanning was performed of the abdomen, with image documentation.  

 

COMPARISONS: Previous CT 5/7/2023.

 

FINDINGS:

Liver:  Liver is enlarged, measuring up to 22.4 cm. There is diffusely increased echogenicity with in
numerable hypoechoic mass lesions. A representative large complex mass in the posterior right hepatic
 lobe measures up to approximately 6.2 cm in dimension.

 

Gallbladder: No gallstones, wall thickening, or pericholecystic fluid.

 

Biliary ducts:  Intrahepatic bile ducts appear nondilated. There is dilatation of the extrahepatic du
ct, with the common bile duct measuring up to 1.0 cm distally.

 

Pancreas:  Not well seen.

 

Right kidney: Right kidney measures 10.0 cm. No hydronephrosis.

 

 

IMPRESSION:  

 

1. Innumerable hepatic mass lesions most likely representing metastatic disease.

 

 

 

Reviewed by: Дмитрий Caldwell MD on 5/7/2023 9:13 PM PDT

Approved by: Дмитрий Caldwell MD on 5/7/2023 9:13 PM PDT

 

 

Station ID:  [id number]

## 2023-05-07 NOTE — HISTORY & PHYSICAL EXAMINATION
Chief Complaint





- Chief Complaint


Chief Complaint: AMS





History of Present Illness





- Admitted From


Admitted From:: ED





- History of Present Illness


HPI Comment/Other: 


This is a 72-year-old female with history of strokes, septal defect on lifelong 

warfarin, type 2 diabetes who presented to the ED for the evaluation of 

abdominal pain and altered mental status. She was brought in by a private 

vehicle accompanied by her . He provided all the history because pt is 

fairly encephalopathic when she presented to the ED.  He states that about 4 to 

5 days ago she started to complain of diarrhea and abdominal pain. Had a fever 

at the beginning but not in the last couple of days. Today she became 

progressively confused and has not been talking in the last few hours PTA. In 

the ED, pt was found to have BEN, INR above 10, transaminitis, CT abd/pelvis 

showed possible mets to liver vs hepatocellular malignancy vs cirrhosis, CT head

showed no acute process, UA/CXR negative for infection, for AMS, BEN, and 

elevated INR, hospitalist was asked to admit the pt for further management.








History





- Past Medical History


Cardiovascular: reports: Hypertension


Respiratory: reports: CPAP use


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 

outside)


Endocrine/Autoimmune: reports: Type 2 diabetes (new diagnosis)


GI: reports: None


: reports: None


Musculoskeletal: reports: Chronic back pain





- Past Surgical History


Ortho: reports: Other (wrist ORIF)


/GYN: reports: Hysterectomy, Other (laparoscopy for endometriosis)





- Family & Social History


Family History Comment/Other: unable to obtain due to AMS


Social History Notes: unable to obtain due to AMS





Meds/Allgy





- Home Medications


Home Medications: 


                                Ambulatory Orders











 Medication  Instructions  Recorded  Confirmed


 


Atorvastatin Calcium 40 mg PO DAILY 02/12/21 11/23/21


 


Furosemide [Lasix] 20 mg PO DAILY 02/12/21 11/23/21


 


Levothyroxine Sodium 150 mcg PO DAILY 02/12/21 11/23/21





[Levothyroxine]   


 


Methadone [Methadone Hcl] 5 mg PO BID 02/12/21 11/23/21


 


Pregabalin [Lyrica] 150 mg PO BID 02/12/21 11/23/21


 


Warfarin [Coumadin] 5 mg PO DAILY 02/12/21 11/23/21


 


amLODIPine [Norvasc] 5 mg PO DAILY 02/12/21 11/23/21


 


lisinopriL [Prinivil] 10 mg PO DAILY 02/12/21 11/23/21


 


tiZANidine [Zanaflex] 4 mg PO BID 02/12/21 11/23/21


 


Metformin HCl [Metformin  mg PO BID 11/23/21 11/23/21





Osmotic]   


 


Ondansetron Odt [Zofran] 4 mg TL Q6H PRN #10 tablet 10/17/22 














- Allergies


Allergies/Adverse Reactions: 


                                    Allergies











Allergy/AdvReac Type Severity Reaction Status Date / Time


 


No Known Drug Allergies Allergy   Verified 10/17/22 14:56














Review of Systems





- Other Findings


Other Findings: 


Pt was oriented to her name but fairly confused when initially brought to the ED

 but improved slowly later the night, I spoke to the  at bedside as well,

 limited full ROS due to her current mental status change.








Exam





- Vital Signs


Reviewed Vital Signs: Yes


Vital Signs: 


                                Vital Signs x48h











  Temp Pulse Resp BP BP Pulse Ox O2 Flow Rate


 


 05/07/23 20:26  36.9 C   18   113/72  96 


 


 05/07/23 19:35   73  17  99/70   96 


 


 05/07/23 18:35   82  12  100/50 L   99 


 


 05/07/23 18:30   82  14  100/50 L   98  2


 


 05/07/23 18:27   88  14  152/100 H   96  2


 


 05/07/23 17:57  37.0 C  109 H  16  141/120 H   89 L 


 


 05/07/23 17:55  37 C  109 H  16  141/120 H   89 L 














- Physical Exam


Comments/Other: 


(with a help of the ED provider)


GENERAL: Patient A and O x 1, NAD.


HEENT: Atraumatic, EOMI.


PULMONARY: CTAB, equal aeration bilaterally.


CARDIAC: RRR, no murmur.


GASTROINTESTINAL: NABS, slightly TTP on RUQ area.


NEURO: CN 2 to 12 grossly intact, moving all 4 exts,


limited full neuro exams due to AMS.








Conclusion/Plan





- Lab Results


Lab results reviewed: Yes


Fish Bones: 


                                 05/07/23 18:39





                                 05/07/23 18:39





- Other


Other Results/Comments: 


Assessment/Plan:





1. Acute metabolic encephalopathy


2. Acute kidney injury


3. Transaminitis


4. Supratherapeutic INR


5. Hx of CVA


6. DM II


7. Hypothyroidism





- CT head showed no acute process, UA/CXR negative for


infection, AMS could be metabolic 2/2 BEN from dehydration


as her  told me she wasn't hydrating well these days


with some diarrhea, diarrhea now stopped. After IVF hydration,


her mental status improved in the ED, con't IVF hydration, 


check renal fxn daily. Hold home meds that can potentially


worsen confusion such as Zanaflex, Lyrica, opioid. 


Currently no signs of active bleeding, con't to hold INR, 


check daily INR. Pt has no hx of cancer, will check tumor


markers, when INR is stable, might need liver biopsy, RUQ US


ordered. SSI for DM II, con't home dose of Synthroid, 


scds for dvt ppx.

## 2023-05-08 LAB
ALBUMIN DIAFP-MCNC: 3 G/DL (ref 3.2–5.5)
ALBUMIN/GLOB SERPL: 0.8 {RATIO} (ref 1–2.2)
ALP SERPL-CCNC: 259 IU/L (ref 42–121)
ALT SERPL W P-5'-P-CCNC: 61 IU/L (ref 10–60)
ANION GAP SERPL CALCULATED.4IONS-SCNC: 13 MMOL/L (ref 6–13)
AST SERPL W P-5'-P-CCNC: 145 IU/L (ref 10–42)
BASOPHILS NFR BLD AUTO: 0 10^3/UL (ref 0–0.1)
BASOPHILS NFR BLD AUTO: 0.4 %
BILIRUB BLD-MCNC: 1.3 MG/DL (ref 0.2–1)
BUN SERPL-MCNC: 34 MG/DL (ref 6–20)
CALCIUM UR-MCNC: 8.3 MG/DL (ref 8.5–10.3)
CANCER AG125 SERPL-ACNC: 14 U/ML (ref 0–35)
CANCER AG15-3 SERPL-ACNC: 625.2 U/ML (ref 0–31.3)
CHLORIDE SERPL-SCNC: 102 MMOL/L (ref 101–111)
CO2 SERPL-SCNC: 30 MMOL/L (ref 21–32)
CREAT SERPLBLD-SCNC: 1.6 MG/DL (ref 0.4–1)
EOSINOPHIL # BLD AUTO: 0 10^3/UL (ref 0–0.7)
EOSINOPHIL NFR BLD AUTO: 0.2 %
ERYTHROCYTE [DISTWIDTH] IN BLOOD BY AUTOMATED COUNT: 14.5 % (ref 12–15)
EST. AVERAGE GLUCOSE BLD GHB EST-MCNC: 157 MG/DL (ref 70–100)
GFRSERPLBLD MDRD-ARVRAT: 32 ML/MIN/{1.73_M2} (ref 89–?)
GLOBULIN SER-MCNC: 3.9 G/DL (ref 2.1–4.2)
GLUCOSE SERPL-MCNC: 120 MG/DL (ref 70–100)
HBA1C MFR BLD HPLC: 7.1 % (ref 4.27–6.07)
HCT VFR BLD AUTO: 34.9 % (ref 37–47)
HGB UR QL STRIP: 10.9 G/DL (ref 12–16)
INR PPP: 4.7 (ref 0.8–1.2)
LYMPHOCYTES # SPEC AUTO: 1.4 10^3/UL (ref 1.5–3.5)
LYMPHOCYTES NFR BLD AUTO: 14.7 %
MAGNESIUM SERPL-MCNC: 2 MG/DL (ref 1.7–2.8)
MCH RBC QN AUTO: 29.7 PG (ref 27–31)
MCHC RBC AUTO-ENTMCNC: 31.2 G/DL (ref 32–36)
MCV RBC AUTO: 95.1 FL (ref 81–99)
MONOCYTES # BLD AUTO: 1 10^3/UL (ref 0–1)
MONOCYTES NFR BLD AUTO: 10.2 %
NEUTROPHILS # BLD AUTO: 7 10^3/UL (ref 1.5–6.6)
NEUTROPHILS # SNV AUTO: 9.5 X10^3/UL (ref 4.8–10.8)
NEUTROPHILS NFR BLD AUTO: 74 %
NRBC # BLD AUTO: 0 /100WBC
NRBC # BLD AUTO: 0 X10^3/UL
PDW BLD AUTO: 11.2 FL (ref 7.9–10.8)
PHOSPHATE BLD-MCNC: 3.9 MG/DL (ref 2.5–4.6)
PLATELET # BLD: 234 10^3/UL (ref 130–450)
POTASSIUM SERPL-SCNC: 3.3 MMOL/L (ref 3.5–5)
PROT SPEC-MCNC: 6.9 G/DL (ref 6.7–8.2)
PROTHROM ACT/NOR PPP: 47.5 SECS (ref 9.9–12.6)
RBC MAR: 3.67 10^6/UL (ref 4.2–5.4)
SODIUM SERPLBLD-SCNC: 145 MMOL/L (ref 135–145)
T4 FREE SERPL-MCNC: 0.98 NG/DL (ref 0.58–1.64)

## 2023-05-08 RX ADMIN — MORPHINE SULFATE PRN MG: 2 INJECTION, SOLUTION INTRAMUSCULAR; INTRAVENOUS at 18:22

## 2023-05-08 RX ADMIN — METHADONE HYDROCHLORIDE SCH MG: 5 TABLET ORAL at 18:00

## 2023-05-08 RX ADMIN — SODIUM CHLORIDE, PRESERVATIVE FREE PRN ML: 5 INJECTION INTRAVENOUS at 07:44

## 2023-05-08 RX ADMIN — DIMETHICONE PRN APPLIC: 13000 CREAM TOPICAL at 20:48

## 2023-05-08 RX ADMIN — ONDANSETRON PRN MG: 2 INJECTION INTRAMUSCULAR; INTRAVENOUS at 11:06

## 2023-05-08 RX ADMIN — SODIUM CHLORIDE, PRESERVATIVE FREE PRN ML: 5 INJECTION INTRAVENOUS at 18:23

## 2023-05-08 RX ADMIN — SODIUM CHLORIDE SCH MLS/HR: 9 INJECTION, SOLUTION INTRAVENOUS at 15:30

## 2023-05-08 RX ADMIN — SODIUM CHLORIDE, PRESERVATIVE FREE PRN ML: 5 INJECTION INTRAVENOUS at 17:18

## 2023-05-08 RX ADMIN — PREGABALIN SCH MG: 100 CAPSULE ORAL at 20:47

## 2023-05-08 RX ADMIN — NYSTATIN SCH APPLIC: 100000 POWDER TOPICAL at 20:48

## 2023-05-08 RX ADMIN — LEVOTHYROXINE SODIUM SCH MCG: 75 TABLET ORAL at 07:43

## 2023-05-08 RX ADMIN — PREGABALIN SCH MG: 100 CAPSULE ORAL at 12:46

## 2023-05-08 RX ADMIN — NYSTATIN SCH APPLIC: 100000 POWDER TOPICAL at 12:46

## 2023-05-08 RX ADMIN — MORPHINE SULFATE PRN MG: 2 INJECTION, SOLUTION INTRAMUSCULAR; INTRAVENOUS at 12:46

## 2023-05-08 RX ADMIN — SODIUM CHLORIDE, PRESERVATIVE FREE SCH ML: 5 INJECTION INTRAVENOUS at 17:18

## 2023-05-08 RX ADMIN — INSULIN LISPRO SCH UNIT: 100 INJECTION, SOLUTION INTRAVENOUS; SUBCUTANEOUS at 17:17

## 2023-05-08 RX ADMIN — INSULIN LISPRO SCH: 100 INJECTION, SOLUTION INTRAVENOUS; SUBCUTANEOUS at 12:19

## 2023-05-08 RX ADMIN — FAMOTIDINE PRN MG: 20 TABLET, FILM COATED ORAL at 20:48

## 2023-05-08 RX ADMIN — SODIUM CHLORIDE, PRESERVATIVE FREE SCH ML: 5 INJECTION INTRAVENOUS at 07:44

## 2023-05-08 RX ADMIN — INSULIN LISPRO SCH: 100 INJECTION, SOLUTION INTRAVENOUS; SUBCUTANEOUS at 20:54

## 2023-05-08 RX ADMIN — INSULIN LISPRO SCH: 100 INJECTION, SOLUTION INTRAVENOUS; SUBCUTANEOUS at 07:58

## 2023-05-08 RX ADMIN — SODIUM CHLORIDE SCH MLS/HR: 9 INJECTION, SOLUTION INTRAVENOUS at 07:43

## 2023-05-08 NOTE — PROVIDER PROGRESS NOTE
Assessment/Plan





- Problem List


(1) Altered mental status


Assessment/Plan: 


Improving mental status. The  at bedside today repeated the Hx that over 

the last 4 days she has had worsening appetite, and weakness, did not get OOB 

for the last 2 days and then was obtunded and did not even talk yesterday.  When

she was brought into the ER yesterday she was only saying yes and no.  She was 

started on IV fluids and this helped her awaken.  


She was given empiric IV Zosyn, I assume the provider was thinking that she had 

AMS from sepsis but her WBC was mildly elevated at 11.9, she had no fever, no 

lactic acid elevation therefore no antibiotics were continued by the admitting 

doctor.  Today 1 blood culture turned positive for coagulase-negative Staph 

which is usually a skin contaminant.


Today she is conversing with me but falls asleep in mid-sentence and appears 

fatigued and very weak.


Plan:


Continue with IV fluids. Will check orthostatic VS before she first stands


Continue antiemetics as needed


Will also order medications to manage her new abdominal pain. Methadone will be 

continued on scheduled twice daily.  We will add IV morphine prn severe pain 

(this was discussed with pharmacist VARSHA).


Will request PT and OT evaluations however will not consider discharging to SNF 

for rehab, because of the urgency in needing to manage the new cancer diagnosis.

She needs to be seen by her PCP to have referral to an Oncologist.


Since her WBC and temperature are normal today, no repeat blood culture was 

ordered today.  No empiric IV antibiotics are being continued.





2) Acute kidney injury


Labs were all reviewed.  She normally runs a creatinine of 0.8.  With her 

history of 4 days of poor oral intake, she is dehydrated and this caused her 

BEN.  Her BUN/creatinine on admission were 41/2.3. She was started on IV fluids 

and today her BUN and creatinine are 34/1.6.


Plan:


Avoid nephrotoxins


Will stop the Lisinopril, due to both BEN and orthostasis


Continue with IV fluids


We will request a nutrition consult for suggestions


Follow BMP daily





3) Orthostatic hypotension


First set of orthostatic vital signs were done before the patient starts 

participating w/ PT and they were abnormal: Supine /62, HR 72.  Sitting BP

108/67, HR 80.  Standing BP 89/63, HR 89.


Plan:


We will give a saline bolus. Continue maintenance IV fluids.


Follow orthostatic VS daily





4) Liver masses


Her CT scan then the ultrasound done yesterday showed "innumerable" masses in 

the liver.  There is one particularly large mass measuring 6.2 cm in the right 

posterior lobe.


The ED provider was the first 1 to tell the patient and  that she pro

bably has cancer.


Plan:


We will try to get a tissue sample for pathology.  I spoke to Dr. Nicolas in [facility]

[facility] today regarding ultrasound-guided biopsy.  Dr. Bowden said she will 

either need CT-guided or ultrasound-guided biopsy.


We will order a biopsy to be done by interventional radiology tomorrow. She will

need to have a normal INR therefore will give vitamin K today and follow INR 

daily.


Dr. Nicolas also said she would like a CT with contrast to really outline the 

masses.  Her CT yesterday was done without contrast because of the elevated 

creatinine.  We will see if tomorrow's creatinine is low enough to administer IV

contrast.


I spoke to our discharge RN Maria Guadalupe, informed her that the plan is patient needs

to see her PCP soon after discharge and needs a referral to Oncology.  Labs and 

imaging were faxed to [facility].  The patient got an appointment made for

1 week from today, on May 15, to see Abran Oneill and his office will start 

working on giving her a referral started to Oncology.


All of the above was discussed with the patient, and  at bedside, and 5 

other family members were in the room as well. All their questions were answered

to their satisfaction.





5) Transaminitis


The family was all in the room and reported that the patient gets yearly 

thorough blood tests.  These were done in October 2022 and the LFTs were normal 

then ( I reviewed all labs in this EMR).


I questioned her about alcohol use and she hardly uses any.  The  and 

other family members concur


Plan:


Follow LFTs daily


Avoid hepatotoxins


Work-up of the liver mets as described in #3 above





6) Supratherapeutic INR


(All labs were reviewed) Patient came in yesterday with an INR greater than 10. 

She admitted she was taking all her usual medicines including the warfarin, 

despite not having any significant oral intake for 4 days. This and the abnormal

liver findings likely caused the INR elevation. She received FFP.  Today her INR

is 4.7.  


Plan:


We will give vitamin K orally today


Follow INR daily.


Her elevated INR was discussed with the Radiologist today. The patient needs to 

be reevaluated tomorrow before definitely undergoing a biopsy tomorrow





7) DM II


The patient takes metformin.  The  admitted she does not really follow a 

diabetic diet.


Plan:


Her diet has been de-escalated to just minced and moist because of her poor 

appetite.


Continue with ACHS fingerstick checks and sliding scale insulin coverage


Her A1c level has been ordered and result is still pending





8) Chronic pain


I asked the patient and  why she is on methadone.  There is a history of 

previously needing to be on oxycodone at very high doses which was then 

transitioned to methadone.  Her chronic pain was in her neck and lower back


Plan:


Continue with the same methadone dose.





9) Hypothyroidism


Her TSH is elevated but free T4 is normal.


Plan:


Continue with her same home thyroid replacement dose





10) Hx of CVA


Patient has had 3 strokes, 2003, 2013 and 2014.  The chart indicates she has a 

"intracardiac shunt". She has chronic weakness on one side.  She uses walk


She is on chronic warfarin since those strokes


Plan:


Eventually she will need PT and OT evaluations.











- Current Meds


Current Meds: 





                               Current Medications











Generic Name Dose Route Start Last Admin





  Trade Name Freq  PRN Reason Stop Dose Admin


 


Sodium Chloride  1,000 mls @ 100 mls/hr  05/07/23 21:00  05/08/23 07:43





  Normal Saline 0.9%  IV   100 mls/hr





  .Q10H SHARRON   Administration


 


Insulin Human Lispro  1 - 9 unit  05/07/23 21:00  05/08/23 07:58





  Insulin Lispro 300 Unit/3 Ml Pen  SUBQ   Not Given





  0800,1200,1700,2100 SHARRON  





  Protocol  


 


Levothyroxine Sodium  150 mcg  05/08/23 07:30  05/08/23 07:43





  Levothyroxine 75 Mcg Tablet  PO   150 mcg





  QDAC SHARRON   Administration


 


Ondansetron HCl  4 mg  05/08/23 10:19  05/08/23 11:06





  Ondansetron 4 Mg/2 Ml Vial  IVP   4 mg





  Q4HR PRN   Administration





  Nausea / Vomiting  


 


Polyethylene Glycol  17 gm  05/08/23 09:00  05/08/23 10:18





  Polyethylene Glycol 3350 17 Gm Packet  PO   Not Given





  DAILY SHARRON  


 


Sodium Chloride  10 ml  05/07/23 20:11  05/08/23 07:44





  Sodium Chloride Flush 0.9% 10 Ml Syringe  IVP   10 ml





  PRN PRN   Administration





  AS NEEDED PER PROVIDER ORDERS  


 


Sodium Chloride  10 ml  05/08/23 01:00  05/08/23 07:44





  Sodium Chloride Flush 0.9% 10 Ml Syringe  IVP   10 ml





  0100,0900,1700 [facility]   Administration














- Lab Result


Fish Bone Diagrams: 


                                 05/08/23 05:45





                                 05/08/23 05:45





- Additional Planning


My Orders: 





My Active Orders





05/08/23


Evaluate and Treat OT [OT] Routine 





05/08/23 10:13


Min Oil/Dimeth/Coconut Oil Crm [Cavilon]   1 applic TOP PRN PRN 





05/08/23 10:19


Ondansetron Inj [Zofran Inj]   4 mg IVP Q4HR PRN 





05/08/23 11:10


Aspirin EC [Ecotrin]   325 mg PO DAILY PRN 





05/08/23 Lunch


DIET [Dysphagia - Minced and Moist] [DIET] 





05/08/23 11:44


Home CPAP/BiPAP [RC] .ONCE 





05/08/23 11:47


Nystatin [Nystop]   1 applic TOP BID 





05/08/23 11:48


Orthostatic [Vital Signs - Orthostatic] [RC] QSHIFT 





05/08/23 11:49


Potassium Chlor 10 Meq/100 ml [Potassium Chloride] 10 meq in 100 ml IV ONCE 





05/08/23 11:57


Morphine Inj (Carpuject) [Morphine (Carpuject)]   2 mg IVP Q4HR PRN 





05/08/23 12:00


Pregabalin [Lyrica]   150 mg PO BID 


Pregabalin [Pregabalin]   1 cap PO BID 





05/08/23 21:00


tiZANidine [Zanaflex]   8 mg PO QPM 





05/09/23 05:00


AMMONIA [CHEM] DAILYLAB 


CBC - COMP BLD CT W/AUTO DIFF [HEME] DAILYLAB 





05/09/23 09:00


amLODIPine [Norvasc]   5 mg PO DAILY 





05/10/23 05:00


CBC - COMP BLD CT W/AUTO DIFF [HEME] DAILYLAB 





05/11/23 05:00


CBC - COMP BLD CT W/AUTO DIFF [HEME] DAILYLAB 














Subjective





- Subjective


Patient Reports: Feeling Better, Pain (Has pain across the upper half of the 

abd, she rates in 6/10. Had nausea this morning, got medicated for that,), Other

 (She appears very fatigued, she falls asleep midsentence.)





Objective


Vital Signs: 





                               Vital Signs - 24 hr











  05/07/23 05/07/23 05/07/23





  17:55 17:57 18:27


 


Temperature 37 C 37.0 C 


 


Heart Rate 109 H 109 H 88


 


Heart Rate [   





Monitoring   





electrodes]   


 


Respiratory 16 16 14





Rate   


 


Blood Pressure 141/120 H 141/120 H 152/100 H


 


Blood Pressure   





[Left Brachial   





artery]   


 


Blood Pressure   





[Right Brachial   





artery]   


 


O2 Saturation 89 L 89 L 96


 


If not protocol   2





: Oxygen Flow,   





liters/minute   














  05/07/23 05/07/23 05/07/23





  18:30 18:35 19:35


 


Temperature   


 


Heart Rate 82 82 73


 


Heart Rate [   





Monitoring   





electrodes]   


 


Respiratory 14 12 17





Rate   


 


Blood Pressure 100/50 L 100/50 L 99/70


 


Blood Pressure   





[Left Brachial   





artery]   


 


Blood Pressure   





[Right Brachial   





artery]   


 


O2 Saturation 98 99 96


 


If not protocol 2  





: Oxygen Flow,   





liters/minute   














  05/07/23 05/07/23 05/07/23





  20:26 21:00 21:07


 


Temperature 36.9 C 36.4 C L 36.4 C L


 


Heart Rate   


 


Heart Rate [  77 77





Monitoring   





electrodes]   


 


Respiratory 18 18 18





Rate   


 


Blood Pressure   


 


Blood Pressure   





[Left Brachial   





artery]   


 


Blood Pressure 113/72 115/71 115/71





[Right Brachial   





artery]   


 


O2 Saturation 96 97 97


 


If not protocol  1.5 1.5





: Oxygen Flow,   





liters/minute   














  05/07/23 05/07/23 05/07/23





  21:48 21:59 22:18


 


Temperature 36.7 C  36.8 C


 


Heart Rate   


 


Heart Rate [ 74  76





Monitoring   





electrodes]   


 


Respiratory 16  14





Rate   


 


Blood Pressure   


 


Blood Pressure   





[Left Brachial   





artery]   


 


Blood Pressure 122/63  117/65





[Right Brachial   





artery]   


 


O2 Saturation 97  97


 


If not protocol 1.5 1.5 1.5





: Oxygen Flow,   





liters/minute   














  05/07/23 05/08/23 05/08/23





  23:30 05:24 07:48


 


Temperature  36.4 C L 36.9 C


 


Heart Rate   


 


Heart Rate [  76 79





Monitoring   





electrodes]   


 


Respiratory  16 17





Rate   


 


Blood Pressure   


 


Blood Pressure   139/65 H





[Left Brachial   





artery]   


 


Blood Pressure  141/54 H 





[Right Brachial   





artery]   


 


O2 Saturation  94 95


 


If not protocol 1.5 1 





: Oxygen Flow,   





liters/minute   














  05/08/23 05/08/23 05/08/23





  09:50 10:50 11:33


 


Temperature 36.9 C  


 


Heart Rate   


 


Heart Rate [ 82  





Monitoring   





electrodes]   


 


Respiratory 16 17 





Rate   


 


Blood Pressure   


 


Blood Pressure 128/78  





[Left Brachial   





artery]   


 


Blood Pressure   





[Right Brachial   





artery]   


 


O2 Saturation 94 84 L 93


 


If not protocol   2





: Oxygen Flow,   





liters/minute   








                                     Oxygen











O2 Source                      Nasal cannula


 


Oxygen Flow Rate               2














I&O (Last 24 Hrs): 





                          Intake and Output Totals x24h











 05/06/23 05/07/23 05/08/23





 23:59 23:59 23:59


 


Intake Total  1510 1240


 


Output Total  600 600


 


Balance  910 640











General: Alert, Mild distress (From fatigue and from abdominal pain), Other 

(Mildly obese)


HEENT: EOMI, Other (Dry oral mucosa)


Neck: Supple, No JVD


Neuro: Alert, Non Focal, Other (Overall fatigued and weak)


Cardiovascular: Regular rate, No murmurs (Distant heart sounds due to large 

breasts)


Respiratory: No respiratory distress, Breath sounds nml


Abdomen: Soft, Other (Obese with a pannus.  Tender to moderate palpation in the 

epigastrium. No guarding or rebound.  Cannot rule out organomegaly due to her 

obese abdomen.)


Extremities: No clubbing, No edema





- Results


Results: 





                               Laboratory Results











WBC  9.5 x10^3/uL (4.8-10.8)   05/08/23  05:45    


 


RBC  3.67 10^6/uL (4.20-5.40)  L  05/08/23  05:45    


 


Hgb  10.9 g/dL (12.0-16.0)  L  05/08/23  05:45    


 


Hct  34.9 % (37.0-47.0)  L  05/08/23  05:45    


 


MCV  95.1 fL (81.0-99.0)   05/08/23  05:45    


 


MCH  29.7 pg (27.0-31.0)   05/08/23  05:45    


 


MCHC  31.2 g/dL (32.0-36.0)  L  05/08/23  05:45    


 


RDW  14.5 % (12.0-15.0)   05/08/23  05:45    


 


Plt Count  234 10^3/uL (130-450)   05/08/23  05:45    


 


MPV  11.2 fL (7.9-10.8)  H  05/08/23  05:45    


 


Neut # (Auto)  7.0 10^3/uL (1.5-6.6)  H  05/08/23  05:45    


 


Lymph # (Auto)  1.4 10^3/uL (1.5-3.5)  L  05/08/23  05:45    


 


Mono # (Auto)  1.0 10^3/uL (0.0-1.0)   05/08/23  05:45    


 


Eos # (Auto)  0.0 10^3/uL (0.0-0.7)   05/08/23  05:45    


 


Baso # (Auto)  0.0 10^3/uL (0.0-0.1)   05/08/23  05:45    


 


Absolute Nucleated RBC  0.00 x10^3/uL  05/08/23  05:45    


 


Nucleated RBC %  0.0 /100WBC  05/08/23  05:45    


 


Manual Slide Review  Indicated   05/07/23  18:39    


 


WBC Morphology  NORMAL APPEARANCE  (NORMAL)   05/07/23  18:39    


 


Platelet Estimate  NORMAL (130-450,000)  (NORMAL)   05/07/23  18:39    


 


Platelet Morphology  NORMAL APPEARANCE  (NORMAL)   05/07/23  18:39    


 


RBC Morph Micro Appear  NORMAL APPEARANCE  (NORMAL)   05/07/23  18:39    


 


PT  47.5 secs (9.9-12.6)  H  05/08/23  05:45    


 


INR  4.7  (0.8-1.2)  H*  05/08/23  05:45    


 


VBG pH  7.343  (7.31-7.41)   05/07/23  18:39    


 


VBG pCO2  44.1 mmHg (41-51)   05/07/23  18:39    


 


VBG pO2  45.6 mmHg (25-47)   05/07/23  18:39    


 


VBG HCO3  23.4 mmol/L (23-28)   05/07/23  18:39    


 


VBG Total CO2  24.8 mmol/L (24-29)   05/07/23  18:39    


 


VBG O2 Saturation  78.9 % (60-80)   05/07/23  18:39    


 


VBG Base Excess  -2.4 mmol/L (-2 - +2)  L  05/07/23  18:39    


 


Sodium  145 mmol/L (135-145)   05/08/23  05:45    


 


Potassium  3.3 mmol/L (3.5-5.0)  L  05/08/23  05:45    


 


Chloride  102 mmol/L (101-111)   05/08/23  05:45    


 


Carbon Dioxide  30 mmol/L (21-32)   05/08/23  05:45    


 


Anion Gap  13.0  (6-13)   05/08/23  05:45    


 


BUN  34 mg/dL (6-20)  H  05/08/23  05:45    


 


Creatinine  1.6 mg/dL (0.4-1.0)  H  05/08/23  05:45    


 


Estimated GFR (MDRD)  32  (>89)  L  05/08/23  05:45    


 


Glucose  120 mg/dL ()  H  05/08/23  05:45    


 


POC Whole Bld Glucose  124 mg/dL (70 - 100)  H  05/08/23  11:15    


 


Lactic Acid  1.9 mmol/L (0.5-2.2)   05/07/23  18:39    


 


Calcium  8.3 mg/dL (8.5-10.3)  L  05/08/23  05:45    


 


Phosphorus  3.9 mg/dL (2.5-4.6)   05/08/23  05:45    


 


Magnesium  2.0 mg/dL (1.7-2.8)   05/08/23  05:45    


 


Total Bilirubin  1.3 mg/dL (0.2-1.0)  H  05/08/23  05:45    


 


AST  145 IU/L (10-42)  H  05/08/23  05:45    


 


ALT  61 IU/L (10-60)  H  05/08/23  05:45    


 


Alkaline Phosphatase  259 IU/L ()  H  05/08/23  05:45    


 


Ammonia  < 10.0 umol/L (7-35)   05/08/23  09:27    


 


Total Protein  6.9 g/dL (6.7-8.2)   05/08/23  05:45    


 


Albumin  3.0 g/dL (3.2-5.5)  L  05/08/23  05:45    


 


Globulin  3.9 g/dL (2.1-4.2)   05/08/23  05:45    


 


Albumin/Globulin Ratio  0.8  (1.0-2.2)  L  05/08/23  05:45    


 


Carcinoembryonic Ag  1633.5 ng/mL  05/08/23  05:45    


 


CA 15-3 Antigen  625.2 U/mL (0.0-31.3)  H  05/08/23  05:45    


 


 Antigen  14.0 U/mL (0.0-35.0)   05/08/23  05:45    


 


TSH  7.06 uIU/mL (0.34-5.60)  H  05/07/23  18:39    


 


Free T4  0.98 ng/dL (0.58-1.64)   05/08/23  05:45    


 


Urine Color  YELLOW   05/07/23  19:16    


 


Urine Clarity  HAZY  (CLEAR)   05/07/23  19:16    


 


Urine pH  5.5 PH (5.0-7.5)   05/07/23  19:16    


 


Ur Specific Gravity  1.020  (1.002-1.030)   05/07/23  19:16    


 


Urine Protein  100 mg/dL (NEGATIVE)  H  05/07/23  19:16    


 


Urine Glucose (UA)  NEGATIVE mg/dL (NEGATIVE)   05/07/23  19:16    


 


Urine Ketones  NEGATIVE mg/dL (NEGATIVE)   05/07/23  19:16    


 


Urine Occult Blood  LARGE  (NEGATIVE)  H  05/07/23  19:16    


 


Urine Nitrite  NEGATIVE  (NEGATIVE)   05/07/23  19:16    


 


Urine Bilirubin  NEGATIVE  (NEGATIVE)   05/07/23  19:16    


 


Urine Urobilinogen  0.2 (NORMAL) E.U./dL (NORMAL)   05/07/23  19:16    


 


Ur Leukocyte Esterase  NEGATIVE  (NEGATIVE)   05/07/23  19:16    


 


Urine RBC  0-5 /HPF (0-5)   05/07/23  19:16    


 


Urine WBC  0-3 /HPF (0-5)   05/07/23  19:16    


 


Ur Squamous Epith Cells  RARE Squamous  (<= Few)   05/07/23  19:16    


 


Amorphous Sediment  Few /LPF  05/07/23  19:16    


 


Urine Bacteria  Few /HPF (None Seen)   05/07/23  19:16    


 


Urine Culture Comments  NOT INDICATED   05/07/23  19:16    


 


Nasal Adenovirus (PCR)  NOT DETECTED   05/07/23  18:36    


 


Nasal B. parapertussis DNA (PCR)  NOT DETECTED   05/07/23  18:36    


 


Nasal Coronavir 229E PCR  NOT DETECTED   05/07/23  18:36    


 


Nasal Coronavir HKU1 PCR  NOT DETECTED   05/07/23  18:36    


 


Nasal Coronavir NL63 PCR  NOT DETECTED   05/07/23  18:36    


 


Nasal Coronavir OC43 PCR  NOT DETECTED   05/07/23  18:36    


 


Nasal Enterovir/Rhinovir PCR  NOT DETECTED   05/07/23  18:36    


 


Nasal Influenza B PCR  NOT DETECTED   05/07/23  18:36    


 


Nasal Influenza A PCR  NOT DETECTED   05/07/23  18:36    


 


Nasal Parainfluen 1 PCR  NOT DETECTED   05/07/23  18:36    


 


Nasal Parainfluen 2 PCR  NOT DETECTED   05/07/23  18:36    


 


Nasal Parainfluen 3 PCR  NOT DETECTED   05/07/23  18:36    


 


Nasal Parainfluen 4 PCR  NOT DETECTED   05/07/23  18:36    


 


Nasal RSV (PCR)  NOT DETECTED   05/07/23  18:36    


 


Nasal B.pertussis DNA PCR  NOT DETECTED   05/07/23  18:36    


 


Nasal C.pneumoniae (PCR)  NOT DETECTED   05/07/23  18:36    


 


Tab Human Metapneumo PCR  NOT DETECTED   05/07/23  18:36    


 


Nasal M.pneumoniae (PCR)  NOT DETECTED   05/07/23  18:36    


 


Nasal SARS-CoV-2 (PCR)  NOT DETECTED   05/07/23  18:36    


 


Urine Opiates Screen  NEGATIVE  (NEGATIVE)   05/07/23  19:16    


 


Ur Oxycodone Screen  NEGATIVE  (NEGATIVE)   05/07/23  19:16    


 


Urine Methadone Screen  POSITIVE  (NEGATIVE)  H  05/07/23  19:16    


 


Ur Propoxyphene Screen  NEGATIVE  (NEGATIVE)   05/07/23  19:16    


 


Ur Barbiturates Screen  NEGATIVE  (NEGATIVE)   05/07/23  19:16    


 


Ur Tricyclics Screen  NEGATIVE  (NEGATIVE)   05/07/23  19:16    


 


Ur Phencyclidine Scrn  NEGATIVE  (NEGATIVE)   05/07/23  19:16    


 


Ur Amphetamine Screen  NEGATIVE  (NEGATIVE)   05/07/23  19:16    


 


U Methamphetamines Scrn  NEGATIVE  (NEGATIVE)   05/07/23  19:16    


 


U Benzodiazepines Scrn  NEGATIVE  (NEGATIVE)   05/07/23  19:16    


 


Urine Cocaine Screen  NEGATIVE  (NEGATIVE)   05/07/23  19:16    


 


U Cannabinoids Screen  NEGATIVE  (NEGATIVE)   05/07/23  19:16    


 


Ethyl Alcohol  < 5.0 mg/dL  05/07/23  18:39    


 


Blood Type  B POSITIVE   05/07/23  19:13    


 


Blood Type Recheck  B POSITIVE   05/07/23  18:39

## 2023-05-08 NOTE — PHARMACY PROGRESS NOTE
- Best Possible Medication History


Admit Date and Time: 05/07/23 2012


Processed by: Pharmacy


Medication History completed: Yes


Patient Interview: Completed


Secondary Source(s): Written medication list, Pharmacy records ([facility].  )





As the person ultimately responsible for medication therapy, providers are able 

to order a medication from an existing home medication list in [facility] via the 

"Reconcile Routine" prior to Confirmation of that medication by support staff. 

Such practice is discouraged except when the physician, in their clinical 

judgment, deems that a medical need exists for a medication without regard to 

previous use.

## 2023-05-09 LAB
AFP-TM SERPL-MCNC: 1.8 NG/ML (ref 0–9.2)
ALBUMIN DIAFP-MCNC: 2.2 G/DL (ref 3.2–5.5)
ALBUMIN/GLOB SERPL: 0.7 {RATIO} (ref 1–2.2)
ALP SERPL-CCNC: 220 IU/L (ref 42–121)
ALT SERPL W P-5'-P-CCNC: 45 IU/L (ref 10–60)
ANION GAP SERPL CALCULATED.4IONS-SCNC: 4 MMOL/L (ref 6–13)
AST SERPL W P-5'-P-CCNC: 66 IU/L (ref 10–42)
BASOPHILS NFR BLD AUTO: 0 10^3/UL (ref 0–0.1)
BASOPHILS NFR BLD AUTO: 0.3 %
BILIRUB BLD-MCNC: 1.1 MG/DL (ref 0.2–1)
BUN SERPL-MCNC: 18 MG/DL (ref 6–20)
CALCIUM UR-MCNC: 7.1 MG/DL (ref 8.5–10.3)
CHLORIDE SERPL-SCNC: 109 MMOL/L (ref 101–111)
CO2 SERPL-SCNC: 29 MMOL/L (ref 21–32)
CREAT SERPLBLD-SCNC: 0.9 MG/DL (ref 0.4–1)
EOSINOPHIL # BLD AUTO: 0 10^3/UL (ref 0–0.7)
EOSINOPHIL NFR BLD AUTO: 0.4 %
ERYTHROCYTE [DISTWIDTH] IN BLOOD BY AUTOMATED COUNT: 15 % (ref 12–15)
GFRSERPLBLD MDRD-ARVRAT: 62 ML/MIN/{1.73_M2} (ref 89–?)
GLOBULIN SER-MCNC: 3.1 G/DL (ref 2.1–4.2)
GLUCOSE SERPL-MCNC: 129 MG/DL (ref 70–100)
HCT VFR BLD AUTO: 28.7 % (ref 37–47)
HGB UR QL STRIP: 8.7 G/DL (ref 12–16)
INR PPP: 2.6 (ref 0.8–1.2)
INR PPP: 4.3 (ref 0.8–1.2)
LYMPHOCYTES # SPEC AUTO: 1.1 10^3/UL (ref 1.5–3.5)
LYMPHOCYTES NFR BLD AUTO: 15.8 %
MCH RBC QN AUTO: 29.7 PG (ref 27–31)
MCHC RBC AUTO-ENTMCNC: 30.3 G/DL (ref 32–36)
MCV RBC AUTO: 98 FL (ref 81–99)
MONOCYTES # BLD AUTO: 0.8 10^3/UL (ref 0–1)
MONOCYTES NFR BLD AUTO: 11.7 %
NEUTROPHILS # BLD AUTO: 5 10^3/UL (ref 1.5–6.6)
NEUTROPHILS # SNV AUTO: 7 X10^3/UL (ref 4.8–10.8)
NEUTROPHILS NFR BLD AUTO: 70.9 %
NRBC # BLD AUTO: 0 /100WBC
NRBC # BLD AUTO: 0 X10^3/UL
PDW BLD AUTO: 11 FL (ref 7.9–10.8)
PLATELET # BLD: 168 10^3/UL (ref 130–450)
POTASSIUM SERPL-SCNC: 3.6 MMOL/L (ref 3.5–5)
PROT SPEC-MCNC: 5.3 G/DL (ref 6.7–8.2)
PROTHROM ACT/NOR PPP: 27.6 SECS (ref 9.9–12.6)
PROTHROM ACT/NOR PPP: 43.6 SECS (ref 9.9–12.6)
RBC MAR: 2.93 10^6/UL (ref 4.2–5.4)
SODIUM SERPLBLD-SCNC: 142 MMOL/L (ref 135–145)

## 2023-05-09 RX ADMIN — INSULIN LISPRO SCH: 100 INJECTION, SOLUTION INTRAVENOUS; SUBCUTANEOUS at 16:54

## 2023-05-09 RX ADMIN — PREGABALIN SCH MG: 100 CAPSULE ORAL at 20:27

## 2023-05-09 RX ADMIN — Medication PRN MG: at 12:01

## 2023-05-09 RX ADMIN — METHADONE HYDROCHLORIDE SCH MG: 5 TABLET ORAL at 07:07

## 2023-05-09 RX ADMIN — INSULIN LISPRO SCH: 100 INJECTION, SOLUTION INTRAVENOUS; SUBCUTANEOUS at 12:27

## 2023-05-09 RX ADMIN — SODIUM CHLORIDE SCH MLS/HR: 900 INJECTION INTRAVENOUS at 17:04

## 2023-05-09 RX ADMIN — SODIUM CHLORIDE, PRESERVATIVE FREE SCH ML: 5 INJECTION INTRAVENOUS at 17:08

## 2023-05-09 RX ADMIN — FAMOTIDINE SCH MG: 20 TABLET, FILM COATED ORAL at 20:27

## 2023-05-09 RX ADMIN — NYSTATIN SCH APPLIC: 100000 POWDER TOPICAL at 20:26

## 2023-05-09 RX ADMIN — SODIUM CHLORIDE SCH MLS/HR: 900 INJECTION INTRAVENOUS at 21:36

## 2023-05-09 RX ADMIN — SODIUM CHLORIDE SCH: 9 INJECTION, SOLUTION INTRAVENOUS at 12:28

## 2023-05-09 RX ADMIN — SODIUM CHLORIDE, PRESERVATIVE FREE SCH ML: 5 INJECTION INTRAVENOUS at 09:54

## 2023-05-09 RX ADMIN — MIDODRINE HYDROCHLORIDE SCH MG: 2.5 TABLET ORAL at 21:37

## 2023-05-09 RX ADMIN — LEVOTHYROXINE SODIUM SCH MCG: 75 TABLET ORAL at 07:07

## 2023-05-09 RX ADMIN — INSULIN LISPRO SCH: 100 INJECTION, SOLUTION INTRAVENOUS; SUBCUTANEOUS at 22:17

## 2023-05-09 RX ADMIN — Medication SCH: at 09:47

## 2023-05-09 RX ADMIN — Medication SCH: at 09:46

## 2023-05-09 RX ADMIN — SODIUM CHLORIDE SCH MLS/HR: 9 INJECTION, SOLUTION INTRAVENOUS at 09:22

## 2023-05-09 RX ADMIN — SODIUM CHLORIDE SCH MLS/HR: 9 INJECTION, SOLUTION INTRAVENOUS at 16:03

## 2023-05-09 RX ADMIN — MORPHINE SULFATE PRN MG: 2 INJECTION, SOLUTION INTRAMUSCULAR; INTRAVENOUS at 09:53

## 2023-05-09 RX ADMIN — MIDODRINE HYDROCHLORIDE SCH MG: 2.5 TABLET ORAL at 05:35

## 2023-05-09 RX ADMIN — FAMOTIDINE PRN MG: 20 TABLET, FILM COATED ORAL at 13:49

## 2023-05-09 RX ADMIN — PREGABALIN SCH MG: 100 CAPSULE ORAL at 09:53

## 2023-05-09 RX ADMIN — NOREPINEPHRINE BITARTRATE SCH MLS/HR: 8 INJECTION, SOLUTION INTRAVENOUS at 10:56

## 2023-05-09 RX ADMIN — MIDODRINE HYDROCHLORIDE SCH MG: 2.5 TABLET ORAL at 13:18

## 2023-05-09 RX ADMIN — SODIUM CHLORIDE SCH MLS/HR: 9 INJECTION, SOLUTION INTRAVENOUS at 00:11

## 2023-05-09 RX ADMIN — SODIUM CHLORIDE, PRESERVATIVE FREE SCH ML: 5 INJECTION INTRAVENOUS at 00:06

## 2023-05-09 RX ADMIN — NYSTATIN SCH APPLIC: 100000 POWDER TOPICAL at 09:53

## 2023-05-09 RX ADMIN — INSULIN LISPRO SCH: 100 INJECTION, SOLUTION INTRAVENOUS; SUBCUTANEOUS at 07:54

## 2023-05-09 NOTE — CT REPORT
PROCEDURE:  ABDOMEN W/WO

 

INDICATIONS:  cancer and biopsy

 

CONTRAST: 100ml Omnipaque 300 

 

TECHNIQUE:  

4 phase scanning was performed.  After the administration of intravenous contrast, 5 mm thick section
s acquired from the diaphragm to the symphysis.  5 mm coronal and sagittal reformats were acquired.  
For radiation dose reduction, the following was used:  automated exposure control, adjustment of mA a
nd/or kV according to patient size.  

 

COMPARISON: 5/7/2023

 

FINDINGS:  

Image quality: Good

 

Lower chest: Separately dictated

 

Solid organs: Multiphasic evaluation of the liver:

Nodular contour of the liver, and prominent fissures, likely due to up to a combination of chronic li
oralia disease and intrinsic liver lesions.

The lesions are not hypervascular. Index lesion is seen in the posterior right lobe measuring 5.1 x 6
.4 cm on image 6/34.

There is displacement and some compression of the intrahepatic vasculature, without obvious tumor thr
ombus.

 

Prominent biliary tree. There is pericholecystic fluid and wall thickening. No pathologic pancreatic 
ductal dilation. Indeterminate lesion is seen in the spleen, obscured by artifact, probably nonenhanc
ing.

 

No adrenal nodules. No hydronephrosis. Nonspecific perinephric stranding is present, particularly domitila
und the left kidney.

 

Vessels and lymph nodes: No abdominal aortic aneurysm. There are borderline enlarged upper abdominal 
lymph nodes, for example jose raul hepatis node measures 1.2 cm on image 6/29.

Another enlarged lymph node is seen in the left para-aortic region (6/39) measuring 1.1 cm in short a
xis.

 

Bowel and peritoneum: No bowel obstruction. At the left paracolic gutter, there is a lesion measuring
 2.6 x 1.2 cm (6/46. No overt ascites.

 

Body wall: Unremarkable

 

Bones: Degenerative changes. No acute or suspicious osseous finding. 

 

IMPRESSION:

Multifocal liver masses, index lesion in the posterior right lobe as above, suspicious for metastases
. If the patient is considered clinically cirrhotic or has a hepatitis history, these would be consid
ered LR M.

These are amenable to percutaneous biopsy.

Suspected malignant upper abdominal and retroperitoneal lymph nodes.

 

A nodular region in the left paracolic gutter could represent early peritoneal spread (6/46).

 

Reviewed by: Viraj Middleton MD on 5/9/2023 11:03 AM PDT

Approved by: Viraj Middleton MD on 5/9/2023 11:03 AM PDT

 

 

Station ID:  [id number]

## 2023-05-09 NOTE — CT REPORT
PROCEDURE:  CHEST W

 

INDICATIONS:  cancer and biopsy

 

CONTRAST: 100ml Omnipaque 300 

 

TECHNIQUE:  

After the administration of intravenous contrast, 1 mm axial images were acquired from the pulmonary 
apices through the posterior costophrenic angles.  Axial 5 mm soft tissue kernel reconstructions were
 performed as well as 8 mm axial MIP and coronal and sagittal 5 mm reformations.  For radiation dose 
reduction, the following was used:  automated exposure control, adjustment of mA and/or kV according 
to patient size.

 

COMPARISON:  Good

 

FINDINGS:  

Image quality: Mild motion artifact.

 

Lungs and pleura:Small right pleural effusion. Possible trace left pleural effusion also present. Bib
asilar atelectasis. No dense airspace disease. Scattered scarring. There are pulmonary micronodules, 
which can be followed on subsequent imaging, for example on the right image 14/74, 41.

 

Mediastinum, heart, and esophagus: No hiatal hernia. Borderline large heart. There are coronary calci
fications. A right central line terminates at the cavoatrial junction. There are prominent cardiophre
julian lymph nodes. No pathologic mediastinal adenopathy by size criteria.

 

Chest wall and thyroid: Unremarkable.

 

Upper abdomen: Separately dictated

 

Bones: No focal suspicious lesion identified.

 

IMPRESSION:

No definite primary malignancy identified in the chest. Small bilateral pleural effusions. Pulmonary 
micronodules can be followed on subsequent imaging. 

 

Reviewed by: Viraj Middleton MD on 5/9/2023 10:47 AM PDT

Approved by: Viraj Middleton MD on 5/9/2023 10:47 AM PDT

 

 

Station ID:  [id number]

## 2023-05-09 NOTE — PROVIDER PROGRESS NOTE
Subjective





- Prog Note Date


Prog Note Date: 05/09/23


Prog Note Time: 14:13





- Subjective


Subjective: 


She is tearful.  Feels that and spite of the pain medication we are giving her, 

she is still in terrible right upper quadrant pain.  It is sharp, stabbing, 

takes her breath away.  She does not want to move. She has been on methadone 

since admission, and she says is not touching her at all.  It really does not do

anything for the pain.  The medicine that works best for the pain is the 

morphine but the morphine only lasts anywhere from half an hour to 45 minutes.  

And then the pain comes back.





Telemedicine had to be called 3 times last night.  1:15 in the morning her 

troponin came back at 67.4.  She had sinus bradycardia, and she denied any new 

chest pain.  So repeat troponin ordered.  About 45 minutes later, she started 

dropping her pressure.  Blood pressure 77/49.  She complained of tingling where 

the IV was.  Stat EKG, lactic acid, BNP and another troponin was done.  EKG had 

normal sinus rhythm with a right bundle branch block.  Lactic acid was 0.8.  BNP

was 81.  And her troponin was 67.4.  The same as the previous troponin.  By 4 in

the morning her blood pressure continued to be low.  She was 81/48.  She was on 

1 L nasal cannula at 96% O2 sat.  Heart rate was 50.  Midodrine 5 mg 3 times 

daily was ordered and her first doses at 6 AM.  And she got a 500 mill bolus of 

normal saline.  A repeat troponin at 6 in the morning was 56.9.





Pharmacy was unable to give methadone or morphine due to her low blood pressure.

 By then the patient was complaining of right upper quadrant pain, unable to 

take a full breath.





When I assessed her at approximately 7:30 in the morning, hypotension continue 

to the 70s.  She was in terrible terrible right upper quadrant pain.  No fever. 

She denies chest pain, cough, chest congestion.  Other than right upper quadrant

pain she had no other abdominal pain.














Current Medications





- Current Medications


Current Medications: 





Active Medications





Aspirin (Aspirin Ec 325 Mg Tablet)  325 mg PO DAILY PRN


   PRN Reason: PAIN 1-4


Calcium Citrate (Calcium Citrate 250 Mg Tablet)  500 mg PO DAILY [facility]


   Last Admin: 05/09/23 09:47 Dose:  Not Given


   


Cholecalciferol (Cholecalciferol 25 Mcg Tablet)  25 mcg PO DAILY [facility]


   Last Admin: 05/09/23 09:46 Dose:  Not Given


   


Famotidine (Famotidine 20 Mg Tablet)  20 mg PO BID [facility]


Sodium Chloride (Normal Saline 0.9%)  1,000 mls @ 125 mls/hr IV .Q8H [facility]


   Last Admin: 05/09/23 12:28 Dose:  Not Given


   


Norepinephrine Bitartrate (Levophed 8 Mg/250 Ml D5w)  8 mg in 250 mls @ 15 mls/h

r IV .[id number] [facility]; Protocol


   Last Admin: 05/09/23 10:56 Dose:  2 mcg/min, 3.75 mls/hr


   


Ibuprofen (Ibuprofen 400 Mg Tablet)  400 mg PO Q4HR PRN


   PRN Reason: Pain 1 to 4


Insulin Human Lispro (Insulin Lispro 300 Unit/3 Ml Pen)  1 - 9 unit SUBQ 

0800,1200,1700,2100 [facility]; Protocol


   Last Admin: 05/09/23 12:27 Dose:  Not Given


   


Levothyroxine Sodium (Levothyroxine 75 Mcg Tablet)  150 mcg PO QDAC [facility]


   Last Admin: 05/09/23 07:07 Dose:  150 mcg


   


Midodrine (Midodrine 2.5 Mg Tablet)  5 mg PO TID [facility]


   Last Admin: 05/09/23 13:18 Dose:  5 mg


   


Mineral Oil (Min Oil/Dimethicon/Coconut Oil 92 Gm Tube)  1 applic TOP PRN PRN


   PRN Reason: Skin Care


   Last Admin: 05/08/23 20:48 Dose:  1 applic


   


Morphine Sulfate/Sodium Chloride (Morphine Pca 50 Mg)  50 mg IV PCA PRN; 

Protocol


   PRN Reason: Severe Pain (Level 7-10)


   Last Admin: 05/09/23 12:01 Dose:  50 mg


   


Multivitamins/Minerals (Multivitamin W/Minerals Tablet)  1 tab PO DAILYWM [facility]


   Last Admin: 05/09/23 09:46 Dose:  Not Given


   


Naloxone HCl (Naloxone 0.4 Mg/Ml Vial)  0.4 mg IVP PRN PRN


   PRN Reason: respiration <10


Nystatin (Nystatin Powder 15 Gm)  1 applic TOP BID [facility]


   Last Admin: 05/09/23 09:53 Dose:  1 applic


   


Ondansetron HCl (Ondansetron 4 Mg/2 Ml Vial)  4 mg IVP Q4HR PRN


   PRN Reason: Nausea / Vomiting


   Last Admin: 05/08/23 11:06 Dose:  4 mg


   


Polyethylene Glycol (Polyethylene Glycol 3350 17 Gm Packet)  17 gm PO DAILY [facility]


   Last Admin: 05/09/23 09:46 Dose:  Not Given


   


Pregabalin (Pregabalin 100 Mg Capsule)  200 mg PO BID [facility]


   Last Admin: 05/09/23 09:53 Dose:  200 mg


   


Sodium Chloride (Sodium Chloride Flush 0.9% 10 Ml Syringe)  10 ml IVP PRN PRN


   PRN Reason: AS NEEDED PER PROVIDER ORDERS


   Last Admin: 05/08/23 18:23 Dose:  10 ml


   


Sodium Chloride (Sodium Chloride Flush 0.9% 10 Ml Syringe)  10 ml IVP 

0100,0900,1700 [facility]


   Last Admin: 05/09/23 09:54 Dose:  10 ml


   


Tizanidine HCl (Tizanidine 4 Mg Tablet)  8 mg PO QPM [facility]


   Last Admin: 05/08/23 20:46 Dose:  8 mg


   





                                        





Atorvastatin Calcium 40 mg PO DAILY 02/12/21 


Furosemide [Lasix] 20 mg PO DAILY 02/12/21 


Levothyroxine Sodium [Levothyroxine] 150 mcg PO DAILY 02/12/21 


Methadone [Methadone Hcl] 5 mg PO BID 02/12/21 


Pregabalin [Lyrica] 150 mg PO BID 02/12/21 


Warfarin [Coumadin] 5 mg PO DAILY 02/12/21 


amLODIPine [Norvasc] 5 mg PO DAILY 02/12/21 


tiZANidine [Zanaflex] 8 mg PO QPM 02/12/21 


Metformin HCl [Metformin ER Osmotic] 500 mg PO BID 11/23/21 


Aspirin [Aspirin EC] 1 tab PO DAILY PRN 05/08/23 


Calcium Citrate/Vitamin D3 [Calcium Cit 315-Vit D3 250 Cpt] 2 tab PO DAILY 

05/08/23 


Cholecalciferol [Vitamin D3] 1 tab PO DAILY 05/08/23 


Famotidine [Pepcid] 1 tab PO HS PRN 05/08/23 


Lisinopril [Zestril] 1 tab PO DAILY 05/08/23 


Multivitamin with Minerals [Multivitamins with Minerals] 1 tab PO DAILY 05/08/23




Pregabalin 1 cap PO BID 05/08/23 











Objective





- Vital Signs/Intake & Output


Reviewed Vital Signs: Yes


Vital Signs: 


                                Vital Signs x48h











  Temp Pulse Resp BP BP Pulse Ox O2 Flow Rate


 


 05/09/23 14:00   51 L  16  103/58 L   99  4


 


 05/09/23 13:00   58 L  17   100/66  99  4


 


 05/09/23 12:00  98.2 C H  58 L  16  101/56 L   96  4


 


 05/09/23 11:00   48 L  14   86/53 L  100  4


 


 05/09/23 10:00   53 L  24  90/60   100  4


 


 05/09/23 09:38  36.7 C  56 L  18   106/56 L  99  4


 


 05/09/23 08:30     71/44 L  77/33 L  


 


 05/09/23 07:42  37.1 C  52 L  17  94/82 H   95  1


 


 05/09/23 07:10   51 L   92/52 L   











Intake & Output: 


                                 Intake & Output











 05/06/23 05/07/23 05/08/23 05/09/23





 23:59 23:59 23:59 23:59


 


Intake Total  1510 5218.33 3305


 


Output Total  600 1875 810


 


Balance  910 3343.33 2495














- Objective


General Appearance: positive: Alert, Other (Moderately deaf, tearful, daughter 

at the bedside, laying at about 30 degrees, tears running silently down her 

cheeks, quiet affect)


Eyes Bilateral: positive: PERRL, EOMI


ENT: positive: No signs of dehydration


Neck: positive: No JVD, Other (Central line right neck).  negative: Stiff neck


Respiratory: positive: No respiratory distress, Other (Diminished at the bases. 

Slow, unlabored, shallow respiration).  negative: Wheezes, Rales, Rhonchi


Cardiovascular: positive: Regular rate & rhythm


Abdomen: positive: Tenderness (Right upper quadrant.  Because of obesity 

difficult to assess for organomegaly but there is a fullness to the right upper 

quadrant.), Other (Quiet bowel sounds.  Abdominal wall is not distended).  

negative: Guarding, Rebound


Skin: positive: Warm, Dry


Extremities: positive: Full ROM, No pedal edema


Neurologic/Psychiatric: positive: Oriented x3, CN's nml (2-12), Motor nml, 

Depressed mood/affect





- Lab Results


Fish Bones: 


                                 05/09/23 05:10





                                 05/09/23 05:10


Other Labs: 


                               Lab Results x24hrs











  05/09/23 05/09/23 05/09/23 Range/Units





  07:53 05:10 05:10 


 


WBC    7.0  (4.8-10.8)  x10^3/uL


 


RBC    2.93 L  (4.20-5.40)  10^6/uL


 


Hgb    8.7 L  (12.0-16.0)  g/dL


 


Hct    28.7 L  (37.0-47.0)  %


 


MCV    98.0  (81.0-99.0)  fL


 


MCH    29.7  (27.0-31.0)  pg


 


MCHC    30.3 L  (32.0-36.0)  g/dL


 


RDW    15.0  (12.0-15.0)  %


 


Plt Count    168  (130-450)  10^3/uL


 


MPV    11.0 H  (7.9-10.8)  fL


 


Neut # (Auto)    5.0  (1.5-6.6)  10^3/uL


 


Lymph # (Auto)    1.1 L  (1.5-3.5)  10^3/uL


 


Mono # (Auto)    0.8  (0.0-1.0)  10^3/uL


 


Eos # (Auto)    0.0  (0.0-0.7)  10^3/uL


 


Baso # (Auto)    0.0  (0.0-0.1)  10^3/uL


 


Absolute Nucleated RBC    0.00  x10^3/uL


 


Nucleated RBC %    0.0  /100WBC


 


PT     (9.9-12.6)  secs


 


INR     (0.8-1.2)  


 


Sodium     (135-145)  mmol/L


 


Potassium     (3.5-5.0)  mmol/L


 


Chloride     (101-111)  mmol/L


 


Carbon Dioxide     (21-32)  mmol/L


 


Anion Gap     (6-13)  


 


BUN     (6-20)  mg/dL


 


Creatinine     (0.4-1.0)  mg/dL


 


Estimated GFR (MDRD)     (>89)  


 


Glucose     ()  mg/dL


 


POC Whole Bld Glucose  108 H    (70 - 100)  mg/dL


 


Estimat Average Glucose     ()  mg/dL


 


Hemoglobin A1c %     (4.27-6.07)  %


 


Lactic Acid     (0.5-2.2)  mmol/L


 


Calcium     (8.5-10.3)  mg/dL


 


Total Bilirubin     (0.2-1.0)  mg/dL


 


AST     (10-42)  IU/L


 


ALT     (10-60)  IU/L


 


Alkaline Phosphatase     ()  IU/L


 


Ammonia     (7-35)  umol/L


 


Troponin I High Sens   56.9 H*   (2.3-14.8)  ng/L


 


B-Natriuretic Peptide     (5-100)  pg/mL


 


Total Protein     (6.7-8.2)  g/dL


 


Albumin     (3.2-5.5)  g/dL


 


Globulin     (2.1-4.2)  g/dL


 


Albumin/Globulin Ratio     (1.0-2.2)  


 


Tumor Marker AFP     (0.0-9.2)  ng/mL














  05/09/23 05/09/23 05/09/23 Range/Units





  05:10 05:10 05:10 


 


WBC     (4.8-10.8)  x10^3/uL


 


RBC     (4.20-5.40)  10^6/uL


 


Hgb     (12.0-16.0)  g/dL


 


Hct     (37.0-47.0)  %


 


MCV     (81.0-99.0)  fL


 


MCH     (27.0-31.0)  pg


 


MCHC     (32.0-36.0)  g/dL


 


RDW     (12.0-15.0)  %


 


Plt Count     (130-450)  10^3/uL


 


MPV     (7.9-10.8)  fL


 


Neut # (Auto)     (1.5-6.6)  10^3/uL


 


Lymph # (Auto)     (1.5-3.5)  10^3/uL


 


Mono # (Auto)     (0.0-1.0)  10^3/uL


 


Eos # (Auto)     (0.0-0.7)  10^3/uL


 


Baso # (Auto)     (0.0-0.1)  10^3/uL


 


Absolute Nucleated RBC     x10^3/uL


 


Nucleated RBC %     /100WBC


 


PT    43.6 H  (9.9-12.6)  secs


 


INR    4.3 H  (0.8-1.2)  


 


Sodium   142   (135-145)  mmol/L


 


Potassium   3.6   (3.5-5.0)  mmol/L


 


Chloride   109   (101-111)  mmol/L


 


Carbon Dioxide   29   (21-32)  mmol/L


 


Anion Gap   4.0 L   (6-13)  


 


BUN   18   (6-20)  mg/dL


 


Creatinine   0.9   (0.4-1.0)  mg/dL


 


Estimated GFR (MDRD)   62 L   (>89)  


 


Glucose   129 H   ()  mg/dL


 


POC Whole Bld Glucose     (70 - 100)  mg/dL


 


Estimat Average Glucose     ()  mg/dL


 


Hemoglobin A1c %     (4.27-6.07)  %


 


Lactic Acid     (0.5-2.2)  mmol/L


 


Calcium   7.1 L   (8.5-10.3)  mg/dL


 


Total Bilirubin   1.1 H   (0.2-1.0)  mg/dL


 


AST   66 H   (10-42)  IU/L


 


ALT   45   (10-60)  IU/L


 


Alkaline Phosphatase   220 H   ()  IU/L


 


Ammonia  15.3    (7-35)  umol/L


 


Troponin I High Sens     (2.3-14.8)  ng/L


 


B-Natriuretic Peptide     (5-100)  pg/mL


 


Total Protein   5.3 L   (6.7-8.2)  g/dL


 


Albumin   2.2 L   (3.2-5.5)  g/dL


 


Globulin   3.1   (2.1-4.2)  g/dL


 


Albumin/Globulin Ratio   0.7 L   (1.0-2.2)  


 


Tumor Marker AFP     (0.0-9.2)  ng/mL














  05/09/23 05/09/23 05/09/23 Range/Units





  03:03 02:42 02:42 


 


WBC     (4.8-10.8)  x10^3/uL


 


RBC     (4.20-5.40)  10^6/uL


 


Hgb     (12.0-16.0)  g/dL


 


Hct     (37.0-47.0)  %


 


MCV     (81.0-99.0)  fL


 


MCH     (27.0-31.0)  pg


 


MCHC     (32.0-36.0)  g/dL


 


RDW     (12.0-15.0)  %


 


Plt Count     (130-450)  10^3/uL


 


MPV     (7.9-10.8)  fL


 


Neut # (Auto)     (1.5-6.6)  10^3/uL


 


Lymph # (Auto)     (1.5-3.5)  10^3/uL


 


Mono # (Auto)     (0.0-1.0)  10^3/uL


 


Eos # (Auto)     (0.0-0.7)  10^3/uL


 


Baso # (Auto)     (0.0-0.1)  10^3/uL


 


Absolute Nucleated RBC     x10^3/uL


 


Nucleated RBC %     /100WBC


 


PT     (9.9-12.6)  secs


 


INR     (0.8-1.2)  


 


Sodium     (135-145)  mmol/L


 


Potassium     (3.5-5.0)  mmol/L


 


Chloride     (101-111)  mmol/L


 


Carbon Dioxide     (21-32)  mmol/L


 


Anion Gap     (6-13)  


 


BUN     (6-20)  mg/dL


 


Creatinine     (0.4-1.0)  mg/dL


 


Estimated GFR (MDRD)     (>89)  


 


Glucose     ()  mg/dL


 


POC Whole Bld Glucose  132 H    (70 - 100)  mg/dL


 


Estimat Average Glucose     ()  mg/dL


 


Hemoglobin A1c %     (4.27-6.07)  %


 


Lactic Acid     (0.5-2.2)  mmol/L


 


Calcium     (8.5-10.3)  mg/dL


 


Total Bilirubin     (0.2-1.0)  mg/dL


 


AST     (10-42)  IU/L


 


ALT     (10-60)  IU/L


 


Alkaline Phosphatase     ()  IU/L


 


Ammonia     (7-35)  umol/L


 


Troponin I High Sens   67.4 H*   (2.3-14.8)  ng/L


 


B-Natriuretic Peptide    81  (5-100)  pg/mL


 


Total Protein     (6.7-8.2)  g/dL


 


Albumin     (3.2-5.5)  g/dL


 


Globulin     (2.1-4.2)  g/dL


 


Albumin/Globulin Ratio     (1.0-2.2)  


 


Tumor Marker AFP     (0.0-9.2)  ng/mL














  05/09/23 05/08/23 05/08/23 Range/Units





  02:42 22:46 20:38 


 


WBC     (4.8-10.8)  x10^3/uL


 


RBC     (4.20-5.40)  10^6/uL


 


Hgb     (12.0-16.0)  g/dL


 


Hct     (37.0-47.0)  %


 


MCV     (81.0-99.0)  fL


 


MCH     (27.0-31.0)  pg


 


MCHC     (32.0-36.0)  g/dL


 


RDW     (12.0-15.0)  %


 


Plt Count     (130-450)  10^3/uL


 


MPV     (7.9-10.8)  fL


 


Neut # (Auto)     (1.5-6.6)  10^3/uL


 


Lymph # (Auto)     (1.5-3.5)  10^3/uL


 


Mono # (Auto)     (0.0-1.0)  10^3/uL


 


Eos # (Auto)     (0.0-0.7)  10^3/uL


 


Baso # (Auto)     (0.0-0.1)  10^3/uL


 


Absolute Nucleated RBC     x10^3/uL


 


Nucleated RBC %     /100WBC


 


PT     (9.9-12.6)  secs


 


INR     (0.8-1.2)  


 


Sodium     (135-145)  mmol/L


 


Potassium     (3.5-5.0)  mmol/L


 


Chloride     (101-111)  mmol/L


 


Carbon Dioxide     (21-32)  mmol/L


 


Anion Gap     (6-13)  


 


BUN     (6-20)  mg/dL


 


Creatinine     (0.4-1.0)  mg/dL


 


Estimated GFR (MDRD)     (>89)  


 


Glucose     ()  mg/dL


 


POC Whole Bld Glucose    131 H  (70 - 100)  mg/dL


 


Estimat Average Glucose     ()  mg/dL


 


Hemoglobin A1c %     (4.27-6.07)  %


 


Lactic Acid  0.8    (0.5-2.2)  mmol/L


 


Calcium     (8.5-10.3)  mg/dL


 


Total Bilirubin     (0.2-1.0)  mg/dL


 


AST     (10-42)  IU/L


 


ALT     (10-60)  IU/L


 


Alkaline Phosphatase     ()  IU/L


 


Ammonia     (7-35)  umol/L


 


Troponin I High Sens   67.4 H*   (2.3-14.8)  ng/L


 


B-Natriuretic Peptide     (5-100)  pg/mL


 


Total Protein     (6.7-8.2)  g/dL


 


Albumin     (3.2-5.5)  g/dL


 


Globulin     (2.1-4.2)  g/dL


 


Albumin/Globulin Ratio     (1.0-2.2)  


 


Tumor Marker AFP     (0.0-9.2)  ng/mL














  05/08/23 05/08/23 05/08/23 Range/Units





  17:00 05:45 05:45 


 


WBC     (4.8-10.8)  x10^3/uL


 


RBC     (4.20-5.40)  10^6/uL


 


Hgb     (12.0-16.0)  g/dL


 


Hct     (37.0-47.0)  %


 


MCV     (81.0-99.0)  fL


 


MCH     (27.0-31.0)  pg


 


MCHC     (32.0-36.0)  g/dL


 


RDW     (12.0-15.0)  %


 


Plt Count     (130-450)  10^3/uL


 


MPV     (7.9-10.8)  fL


 


Neut # (Auto)     (1.5-6.6)  10^3/uL


 


Lymph # (Auto)     (1.5-3.5)  10^3/uL


 


Mono # (Auto)     (0.0-1.0)  10^3/uL


 


Eos # (Auto)     (0.0-0.7)  10^3/uL


 


Baso # (Auto)     (0.0-0.1)  10^3/uL


 


Absolute Nucleated RBC     x10^3/uL


 


Nucleated RBC %     /100WBC


 


PT     (9.9-12.6)  secs


 


INR     (0.8-1.2)  


 


Sodium     (135-145)  mmol/L


 


Potassium     (3.5-5.0)  mmol/L


 


Chloride     (101-111)  mmol/L


 


Carbon Dioxide     (21-32)  mmol/L


 


Anion Gap     (6-13)  


 


BUN     (6-20)  mg/dL


 


Creatinine     (0.4-1.0)  mg/dL


 


Estimated GFR (MDRD)     (>89)  


 


Glucose     ()  mg/dL


 


POC Whole Bld Glucose  154 H    (70 - 100)  mg/dL


 


Estimat Average Glucose    157 H  ()  mg/dL


 


Hemoglobin A1c %    7.1 H  (4.27-6.07)  %


 


Lactic Acid     (0.5-2.2)  mmol/L


 


Calcium     (8.5-10.3)  mg/dL


 


Total Bilirubin     (0.2-1.0)  mg/dL


 


AST     (10-42)  IU/L


 


ALT     (10-60)  IU/L


 


Alkaline Phosphatase     ()  IU/L


 


Ammonia     (7-35)  umol/L


 


Troponin I High Sens     (2.3-14.8)  ng/L


 


B-Natriuretic Peptide     (5-100)  pg/mL


 


Total Protein     (6.7-8.2)  g/dL


 


Albumin     (3.2-5.5)  g/dL


 


Globulin     (2.1-4.2)  g/dL


 


Albumin/Globulin Ratio     (1.0-2.2)  


 


Tumor Marker AFP   1.8   (0.0-9.2)  ng/mL














ABX Reporting


Has patient been on IV antibiotics over the past 48 hours?: No





Assessment/Plan





- Problem List


(1) Hypotension


Impression: 


In this unfortunate female, hypotension could be from the disease process.  CT 

scan shows extensive, multiple, masses in the liver.  A left paracolic gutter 

nodule, and lymphadenopathy.  She does not appear to have signs and symptoms of 

sepsis with the source other than hypotension.  There is no pulmonary, GI, or 

findings other than pericholecystic inflammation on CT done on admission.  She 

is tender in the right upper quadrant.  She is not having an MI.  She does not 

have a GI bleed but could be bleeding in her liver due to mets and an elevated 

INR (but no tachycardia on vitals).  She is not intravascularly depleted with 

dehydration





A total of 38 minutes was spent in critical care of the patient (transfer 

orders, evaluation of hypotension, discussing care with RN, discussing care with

patient and updating twice, discussion with daughter, and establishing code 

status)





Plan:


Transfer to ICU


I ordered Levophed to maintain mean arterial pressure at 65


Until I can rule out infection, start broad-spectrum antibiotics with cefepime 

Vanco and Flagyl. Blood cultures before the antibiotics.


Recheck CT of the abdomen since her right upper quadrant pain has progressed to 

severe, intractable pain overnight.


She is asking to change her diet from dysphagia or pure.  Sometimes she just 

wants something in her mouth even though she does not have an appetite.  So I am

changing her to a regular diet and I told nursing that this patient can have 

what ever she wants to from a food perspective.











(2) RUQ abdominal pain


Impression: 


This unfortunate female has already had imaging studies that tell us that she 

has multiple, multiple masses to her liver.  The presumptive thought is that she

has metastatic disease of unknown primary.  There is nodularity along the 

capsule as well as the parenchyma.  Gallbladder is unremarkable.  No hydroneph

rosis.  This was seen on the admission abdomen/pelvis CT May 7.  That was done 

without contrast.  Abdominal ultrasound also shows innumerable hypoechoic mass 

lesions.  A representative large complex mass in the posterior right hepatic 

lobe measures 6.2 cm.  The intrahepatic ducts appear nondilated.  There is 

dilation of the extrahepatic duct with the common bile duct 1 cm (think primary 

hepatobiliary w mets or a passed stone).  Her total bili was 1.7 on admission.  

It is 1.1 today.  AST was 206 on admission and is 66 today.  ALT was 74 on 

admission and is now 45 today.  Alk phos was 311 and is 220 today.  Ammonia 

level is 15.3.





The radiologist and I discussed the previous CT.  And our plans moving forward. 

Our obvious suspicion is that she has cancer, either hepatobiliary of mets.  Or 

unknown primary with mets.  I am hoping for a pathologic piece of tissue to give

us the diagnosis.  Her INR is quite elevated.  It was greater than 10 on 

admission and she received FFP and vitamin K to bring it down to 4.7 on May 8.  

Today her INR is 4.3.





I am worried that she may be bleeding in her liver causing such new intractable 

pain.  After I reviewed her liver function studies, I also must think that she 

may be having gallbladder symptoms even though she did not have stones on the 

admission CT.  If her elevated liver function studies were from tumor, they 

should not be going down.  And they are going down. Radiologist is worried that 

they do not have a contrasted CAT scan to let him know if they will be getting 

into trouble with an angioma.





Plan:


Multiphase CT of the abdomen to look at her liver for angioma


Cancel biopsy today since INR is 4.3 and radiology would prefer to be less than 

2.  Reschedule biopsy for tomorrow


Vitamin K 10 mg p.o. today


Repeat INR at 6 PM and see if she needs FFP to be ready for tomorrow


Check CEA, alpha-fetoprotein, , CA 15-3, CA 27-29


Stop methadone, 2 mg morphine IV push every 4 hours as needed, and then start 

morphine PCA. 1 mg/h, maximum 8 mg for 4 hours.  Lockout interval 10 minutes.  

She can give herself 1 mg as needed

















(3) Altered mental status


Impression: 


From what I can assess in the chart, this has been slowly improving.  This 

morning she is tearful, but appropriate.  Able to tell me what she is feeling, 

oriented to the fact that she is in the hospital.  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

a slight barrier in communication, but she states that she can hear me and 

understands everything I am saying. Daughter is at the bedside this morning.  

She endorses that mom is much better from a mental status perspective. Her 

 was at bedside 5/8 and repeated the Hx that, over the last 4 days, she 

has had worsening appetite, and weakness, did not get OOB for the last 2 days 

and then was obtunded and did not even talk yesterday.  When she was brought 

into the ER 5/7, she was only saying yes and no.  She was started on IV fluids 

and this helped her awaken.  She has lost about 10 kg of weight when we reviewed

her outpatient chart.  But she states that this was deliberate on her part.  She

was cutting back on food and being restrictive about "bad choices", and was 

actively trying to lose weight.





She was given empiric IV Zosyn on admission, and the previous hospitalist 

assumed the admit provider was thinking that she had AMS from sepsis but her WBC

was mildly elevated at 11.9, she had no fever, no lactic acid elevation 

therefore no antibiotics were continued by the admitting doctor.  5/8, 1 blood 

culture turned positive for coagulase-negative Staph which is usually a skin c

ontaminant.


On 5/8 she was conversing with hospitalist but would fall asleep in mid-sentence

and appeared fatigued and very weak.





Today she is still very fatigued, weak, but able to tell me she is in the 

hospital.  And she processes the information of her liver masses with grief, 

tears. She has also dropped her pressures, and I have transferred to the ICU.  

One of the differential diagnosis does include infection.





Plan:


Continue with IV fluids. Will check orthostatic VS before she first stands


Continue antiemetics as needed


Change pain management as in problem #2


Will request PT and OT evaluations however will not consider discharging to SNF 

for rehab, because of the urgency in needing to manage the new cancer diagnosis.

She needs to be seen by her PCP to have referral to an Oncologist.


Since her WBC and temperature are normal today, no repeat blood culture was 

ordered today.  No empiric IV antibiotics are being continued.





(3) Acute kidney injury resolved. 


Labs were all reviewed.  She normally runs a creatinine of 0.8.  With her 

history of 4 days of poor oral intake, she is dehydrated and this caused her 

BEN.  Her BUN/creatinine on admission were 41/2.3. Not only was her lisinopril 

stopped, she was started on IV fluids and >> 34/1.6>>18/0.9 today. 


Plan:


Avoid nephrotoxins


Continue with IV fluids


Follow BMP daily





(4) Orthostatic hypotension


First set of orthostatic vital signs were done before the patient starts 

participating w/ PT and they were abnormal: Supine /62, HR 72.  Sitting BP

108/67, HR 80.  Standing BP 89/63, HR 89.


This only confirmed the intravascular depletion from dehydration.  Today, with 

her severe hypotension in the 70s systolic, she has been transferred to the ICU 

and started on Levophed.


Plan:


Hold off on working with PT for now.  Continue to monitor her blood pressure and

when her blood pressure is no longer orthostatic or hypotensive she can start 

with therapy.





(5) Liver masses


Her CT scan then the ultrasound done  showed "innumerable" masses in the liver. 

There is one particularly large mass measuring 6.2 cm in the right posterior 

lobe.


The ED provider was the first 1 to tell the patient and  that she 

probably has cancer. We had planned to get tissue sample for pathology.  

Conversation was held with radiology on May 8 and we needed to normalize her 

INR.  On May 8 she received vitamin K and FFP.  In spite of that, her INR is 

still over 4 today.  Today's radiologist wanted better imaging with contrast and

a repeat CT was done.  Not only does she have the same liver masses, she appears

to have nodules in the paracolic gutter indicating carcinomatosis.  

Lymphadenopathy.  She is still not ready for biopsy because of INR.  I am also 

noting that she has changes of inflammation on her gallbladder.  Could you have 

passed a stone?  Could she have primary hepatobiliary cancer that has 

metastasized.





In speaking to radiology today, the repeat CAT scan has been helpful so he does 

not have to worry about a hemangioma.





I also spoke to oncology in the medical ambulatory clinic.  I presented the case

to the oncologist.  Her hypotension and possible sepsis is definitely 

concerning.  But they feel that if we can get her through this temporary phase 

of illness, and have a tissue diagnosis, there are therapies that could allow 

her a decent quality of life in an extended life of a few years.  They feel it 

is worth getting a liver biopsy and moving forward with at least getting a 

tissue diagnosis.





I also spoke to the patient about CODE STATUS.  She really did understand the 

possibility that she could have a terminal illness and she was understandably 

tearful and grief stricken.  This is all happening so fast.  I discussed what 

the resuscitative effort would mean with regards to CPR and intubation.  She 

states that she does not want that.  While she is willing to go to do treatment,

get a biopsy, and see how she does, she feels that a full on resuscitative 

measures with CPR and intubation would not be what she wants.  "What would be 

the point" is her statement.





Plan:


Tumor markers as already discussed above


Vitamin K 10 mg p.o. today


Repeat INR at 6 PM


If INR is not down to acceptable levels of less than 2, 2 units of FFP now and 

possibly 2 units of FFP tomorrow morning


Change CODE STATUS to DO NOT RESUSCITATE





(6) Transaminitis


The family was all in the room and reported that the patient gets yearly 

thorough blood tests.  These were done in October 2022 and the LFTs were normal 

then ( I reviewed all labs in this EMR).


I questioned her about alcohol use and she hardly uses any.  The  and 

other family members concur. In following her liver function studies daily, they

are improving.  If she truly had LFTs that were elevated because of tumor, the 

LFTs would not be getting better.  Since they are getting better it makes me 

wonder if she has a transitory process such as cholecystitis or a passed stone 

giving her the right upper quadrant pain that so severe today.


Plan:


Follow LFTs daily


Avoid hepatotoxins


Work-up of the liver mets as described in #3 above


Started on antibiotics





(7) Supratherapeutic INR


(All labs were reviewed) Patient came in yesterday with an INR greater than 10. 

She admitted she was taking all her usual medicines including the warfarin, 

despite not having any significant oral intake for 4 days. This and the abnormal

liver findings likely caused the INR elevation. She received FFP.  Yesterday,  

her INR was 4.7.  She then received FFP and vitamin K.  Today's INR is 4.3.


Plan:


We will give vitamin K 10 mg orally today


Repeat INR at 6 PM.  If still elevated see problem #5 with this plan.  Our goal 

is to get her to a liver biopsy





(8) DM II


The patient takes metformin.  The  admitted she does not really follow a 

diabetic diet. Our treatment consist of ACHS fingerstick checks and sliding 

scale insulin coverage for this. A1c is 7.1%.  Yesterday her glucose was 96, 

124, 154, 131.


Today her glucose is 108, 139.  She is eating 25 to 50% of her food.


Plan:


Change diet to regular diet at her request.


Continue with ACHS fingerstick checks and sliding scale insulin coverage





(9) Chronic pain


I asked the patient and  why she is on methadone.  There is a history of 

previously needing to be on oxycodone at very high doses which was then 

transitioned to methadone.  Her chronic pain was in her neck and lower back. 


Today her right upper quadrant pain is so severe that she is requiring doses of 

morphine IV that are not holding her.  It is not her diffuse pain that is 

bothering her.





Plan:


PCA pump as noted above


Stop methadone





(10) Hypothyroidism


Her TSH is elevated but free T4 is normal.


Plan:


Continue with her same home thyroid replacement dose





(11) Hx of CVA


Patient has had 3 strokes, 2003, 2013 and 2014.  The chart indicates she has a 

"intracardiac shunt". She has chronic weakness on one side.  She uses walk


She is on chronic warfarin since those strokes


Plan:


Eventually she will need PT and OT evaluations.





(12)  BOBBI





Plan: I have reiterated to her family that her home machine needs to be brought 

in here.  Especially in view of the fact of the morphine use.

## 2023-05-09 NOTE — CT REPORT
PROCEDURE:  ABDOMEN/PELVIS W

 

INDICATIONS:  cancer and biopsy work up

 

CONTRAST: 100ml Omnipaque 300 

 

TECHNIQUE: 

After the administration of IV contrast, 5 mm thick sections acquired from the diaphragms to the symp
hysis.  5 mm thick coronal and sagittal reformats were acquired.  For radiation dose reduction, the f
ollowing was used:  automated exposure control, adjustment of mA and/or kV according to patient size.
  

 

COMPARISON:  5/7/2023

 

FINDINGS:  

Image quality: Good

 

Lower chest: Separately dictated

 

Solid organs: Liver is better assessed on simultaneous multiphase imaging.

 

There is pericholecystic fluid and wall thickening, nonspecific. The biliary tree and pancreatic duct
 are prominent, without definite pathologic dilation or obstruction. There is a small possible spleni
c cyst, difficult to evaluate due to artifact in the region. No adrenal nodules. No hydronephrosis. N
onspecific perinephric stranding is present.

 

Vessels and lymph nodes: Pathologic-appearing lymph nodes as described on concurrent multiphasic CT r
eport. No aneurysmal vessel identified.

 

Bowel and peritoneum: No evidence of small bowel obstruction. Left paracolic gutter lesion is present
, as described on concurrent multiphase CT report. There is a small amount pelvic free fluid.

 

Body wall: Unremarkable

 

Pelvis: Small amount pelvic free fluid. A Elias is in place and underdistended bladder, not well eval
uated. Hysterectomy.

 

Bones: No acute or suspicious osseous finding. There are degenerative changes. 

 

IMPRESSION:

Concurrent CT multiphase reported separately dictated. Please see that report for relevant oncologic 
findings.

 

Nonspecific perinephric fat stranding, correlate with urinalysis. Nonspecific mildly prominent biliar
y tree and pancreatic duct, with edematous changes around the gallbladder. Please correlate with any 
obstructive LFT pattern, symptoms of cholangitis, and consider ultrasound

 

Small indeterminate pelvic free fluid.

 

Reviewed by: Viraj Middleton MD on 5/9/2023 11:33 AM PDT

Approved by: Viraj Middleton MD on 5/9/2023 11:33 AM PDT

 

 

Station ID:  [id number]

## 2023-05-10 LAB
ALBUMIN DIAFP-MCNC: 2.2 G/DL (ref 3.2–5.5)
ALBUMIN/GLOB SERPL: 0.7 {RATIO} (ref 1–2.2)
ALP SERPL-CCNC: 303 IU/L (ref 42–121)
ALT SERPL W P-5'-P-CCNC: 52 IU/L (ref 10–60)
ANION GAP SERPL CALCULATED.4IONS-SCNC: 5 MMOL/L (ref 6–13)
AST SERPL W P-5'-P-CCNC: 70 IU/L (ref 10–42)
BASOPHILS NFR BLD AUTO: 0.1 10^3/UL (ref 0–0.1)
BASOPHILS NFR BLD AUTO: 0.6 %
BILIRUB BLD-MCNC: 1.1 MG/DL (ref 0.2–1)
BUN SERPL-MCNC: 13 MG/DL (ref 6–20)
CA-I SERPL ISE-MCNC: 1.05 MMOL/L (ref 1.15–1.33)
CA-I SERPL ISE-MCNC: 1.06 MMOL/L (ref 1.15–1.33)
CA-I SERPL ISE-MCNC: 1.08 MMOL/L (ref 1.15–1.33)
CALCIUM UR-MCNC: 7.3 MG/DL (ref 8.5–10.3)
CHLORIDE SERPL-SCNC: 110 MMOL/L (ref 101–111)
CO2 SERPL-SCNC: 28 MMOL/L (ref 21–32)
CREAT SERPLBLD-SCNC: 0.7 MG/DL (ref 0.4–1)
EOSINOPHIL # BLD AUTO: 0.2 10^3/UL (ref 0–0.7)
EOSINOPHIL NFR BLD AUTO: 1.9 %
ERYTHROCYTE [DISTWIDTH] IN BLOOD BY AUTOMATED COUNT: 15 % (ref 12–15)
GFRSERPLBLD MDRD-ARVRAT: 82 ML/MIN/{1.73_M2} (ref 89–?)
GLOBULIN SER-MCNC: 3.3 G/DL (ref 2.1–4.2)
GLUCOSE SERPL-MCNC: 134 MG/DL (ref 70–100)
HCT VFR BLD AUTO: 33 % (ref 37–47)
HGB UR QL STRIP: 10.1 G/DL (ref 12–16)
INR PPP: 1.7 (ref 0.8–1.2)
INR PPP: 1.9 (ref 0.8–1.2)
LYMPHOCYTES # SPEC AUTO: 1 10^3/UL (ref 1.5–3.5)
LYMPHOCYTES NFR BLD AUTO: 12.8 %
MAGNESIUM SERPL-MCNC: 1.7 MG/DL (ref 1.7–2.8)
MCH RBC QN AUTO: 30.1 PG (ref 27–31)
MCHC RBC AUTO-ENTMCNC: 30.6 G/DL (ref 32–36)
MCV RBC AUTO: 98.2 FL (ref 81–99)
MONOCYTES # BLD AUTO: 0.8 10^3/UL (ref 0–1)
MONOCYTES NFR BLD AUTO: 10.5 %
NEUTROPHILS # BLD AUTO: 5.8 10^3/UL (ref 1.5–6.6)
NEUTROPHILS # SNV AUTO: 7.9 X10^3/UL (ref 4.8–10.8)
NEUTROPHILS NFR BLD AUTO: 73.4 %
NRBC # BLD AUTO: 0 /100WBC
NRBC # BLD AUTO: 0 X10^3/UL
PDW BLD AUTO: 11.2 FL (ref 7.9–10.8)
PH BLDV: 7.29 [PH] (ref 7.31–7.41)
PH BLDV: 7.29 [PH] (ref 7.31–7.41)
PH BLDV: 7.3 [PH] (ref 7.31–7.41)
PHOSPHATE BLD-MCNC: 2.5 MG/DL (ref 2.5–4.6)
PLATELET # BLD: 227 10^3/UL (ref 130–450)
POTASSIUM SERPL-SCNC: 3.2 MMOL/L (ref 3.5–5)
PROT SPEC-MCNC: 5.5 G/DL (ref 6.7–8.2)
PROTHROM ACT/NOR PPP: 18.7 SECS (ref 9.9–12.6)
PROTHROM ACT/NOR PPP: 20.6 SECS (ref 9.9–12.6)
RBC MAR: 3.36 10^6/UL (ref 4.2–5.4)
SODIUM SERPLBLD-SCNC: 143 MMOL/L (ref 135–145)

## 2023-05-10 RX ADMIN — INSULIN LISPRO SCH UNIT: 100 INJECTION, SOLUTION INTRAVENOUS; SUBCUTANEOUS at 20:48

## 2023-05-10 RX ADMIN — Medication SCH MCG: at 08:48

## 2023-05-10 RX ADMIN — FAMOTIDINE SCH MG: 20 TABLET, FILM COATED ORAL at 08:48

## 2023-05-10 RX ADMIN — SODIUM CHLORIDE, PRESERVATIVE FREE PRN ML: 5 INJECTION INTRAVENOUS at 04:45

## 2023-05-10 RX ADMIN — Medication SCH MG: at 08:47

## 2023-05-10 RX ADMIN — NOREPINEPHRINE BITARTRATE SCH MLS/HR: 8 INJECTION, SOLUTION INTRAVENOUS at 10:52

## 2023-05-10 RX ADMIN — SODIUM CHLORIDE SCH MLS/HR: 900 INJECTION INTRAVENOUS at 14:37

## 2023-05-10 RX ADMIN — SODIUM CHLORIDE, PRESERVATIVE FREE SCH ML: 5 INJECTION INTRAVENOUS at 18:50

## 2023-05-10 RX ADMIN — SODIUM CHLORIDE SCH MLS/HR: 9 INJECTION, SOLUTION INTRAVENOUS at 20:38

## 2023-05-10 RX ADMIN — INSULIN LISPRO SCH: 100 INJECTION, SOLUTION INTRAVENOUS; SUBCUTANEOUS at 08:45

## 2023-05-10 RX ADMIN — LEVOTHYROXINE SODIUM SCH MCG: 75 TABLET ORAL at 07:01

## 2023-05-10 RX ADMIN — INSULIN LISPRO SCH: 100 INJECTION, SOLUTION INTRAVENOUS; SUBCUTANEOUS at 17:54

## 2023-05-10 RX ADMIN — SODIUM CHLORIDE SCH MLS/HR: 900 INJECTION INTRAVENOUS at 05:37

## 2023-05-10 RX ADMIN — MIDODRINE HYDROCHLORIDE SCH MG: 2.5 TABLET ORAL at 14:37

## 2023-05-10 RX ADMIN — FAMOTIDINE SCH MG: 20 TABLET, FILM COATED ORAL at 20:39

## 2023-05-10 RX ADMIN — PREGABALIN SCH MG: 100 CAPSULE ORAL at 08:48

## 2023-05-10 RX ADMIN — INSULIN LISPRO SCH: 100 INJECTION, SOLUTION INTRAVENOUS; SUBCUTANEOUS at 13:49

## 2023-05-10 RX ADMIN — MIDODRINE HYDROCHLORIDE SCH MG: 2.5 TABLET ORAL at 05:37

## 2023-05-10 RX ADMIN — SODIUM CHLORIDE SCH MLS/HR: 900 INJECTION INTRAVENOUS at 21:34

## 2023-05-10 RX ADMIN — NYSTATIN SCH APPLIC: 100000 POWDER TOPICAL at 20:42

## 2023-05-10 RX ADMIN — NYSTATIN SCH APPLIC: 100000 POWDER TOPICAL at 08:49

## 2023-05-10 RX ADMIN — Medication SCH TAB: at 08:45

## 2023-05-10 RX ADMIN — PREGABALIN SCH MG: 100 CAPSULE ORAL at 20:38

## 2023-05-10 RX ADMIN — SODIUM CHLORIDE SCH MLS/HR: 9 INJECTION, SOLUTION INTRAVENOUS at 00:40

## 2023-05-10 RX ADMIN — SODIUM CHLORIDE, PRESERVATIVE FREE SCH ML: 5 INJECTION INTRAVENOUS at 04:45

## 2023-05-10 RX ADMIN — SODIUM CHLORIDE SCH MLS/HR: 9 INJECTION, SOLUTION INTRAVENOUS at 13:34

## 2023-05-10 RX ADMIN — MIDODRINE HYDROCHLORIDE SCH MG: 2.5 TABLET ORAL at 21:34

## 2023-05-10 RX ADMIN — SODIUM CHLORIDE, PRESERVATIVE FREE SCH ML: 5 INJECTION INTRAVENOUS at 08:49

## 2023-05-10 RX ADMIN — Medication PRN MG: at 17:21

## 2023-05-10 RX ADMIN — DIMETHICONE PRN APPLIC: 13000 CREAM TOPICAL at 21:34

## 2023-05-10 RX ADMIN — SODIUM PHOSPHATE, DIBASIC, ANHYDROUS, POTASSIUM PHOSPHATE, MONOBASIC, AND SODIUM PHOSPHATE, MONOBASIC, MONOHYDRATE SCH MG: 852; 155; 130 TABLET, COATED ORAL at 11:18

## 2023-05-10 RX ADMIN — SODIUM PHOSPHATE, DIBASIC, ANHYDROUS, POTASSIUM PHOSPHATE, MONOBASIC, AND SODIUM PHOSPHATE, MONOBASIC, MONOHYDRATE SCH MG: 852; 155; 130 TABLET, COATED ORAL at 08:46

## 2023-05-10 RX ADMIN — DIMETHICONE PRN APPLIC: 13000 CREAM TOPICAL at 05:31

## 2023-05-10 NOTE — PROVIDER PROGRESS NOTE
Subjective





- Prog Note Date


Prog Note Date: 05/10/23


Prog Note Time: 11:21





- Subjective


Subjective: 


For the last couple of days we have been talking about the liver biopsy.  This 

morning radiology cancels the liver biopsy because her INR was 1.9.  This caused

an understandable amount of consternation amongst her and her family.  We are 

working on getting her rescheduled and getting that INR down.





Other than that, there is no new complaints.  No chest pain, no palpitations, no

shortness of breath.  She is valiently trying to eat to make sure she keeps her 

caloric intake and but is failing.  She says that the right upper quadrant pain 

is better than it was yesterday on the PCA morphine pump.








Current Medications





- Current Medications


Current Medications: 





Active Medications





Aspirin (Aspirin Ec 325 Mg Tablet)  325 mg PO DAILY PRN


   PRN Reason: PAIN 1-4


Calcium Citrate (Calcium Citrate 250 Mg Tablet)  500 mg PO DAILY [facility]


   Last Admin: 05/10/23 08:47 Dose:  500 mg


   


Cholecalciferol (Cholecalciferol 25 Mcg Tablet)  25 mcg PO DAILY [facility]


   Last Admin: 05/10/23 08:48 Dose:  25 mcg


   


Famotidine (Famotidine 20 Mg Tablet)  20 mg PO BID [facility]


   Last Admin: 05/10/23 08:48 Dose:  20 mg


   


Sodium Chloride (Normal Saline 0.9%)  1,000 mls @ 125 mls/hr IV .Q8H [facility]


   Last Infusion: 05/10/23 07:00 Dose:  125 mls/hr


   


Norepinephrine Bitartrate (Levophed 8 Mg/250 Ml D5w)  8 mg in 250 mls @ 15 

mls/hr IV .[id number] [facility]; Protocol


   Last Admin: 05/10/23 10:52 Dose:  2 mcg/min, 3.75 mls/hr


   


Cefepime HCl 2 gm/ Sodium (Chloride)  100 mls @ 200 mls/hr IV TID [facility]


   Last Infusion: 05/10/23 07:00 Dose:  Infused


   


Metronidazole (Flagyl 500 Mg/100 Ml)  500 mg in 100 mls @ 100 mls/hr IV TID [facility]


   Last Infusion: 05/10/23 08:50 Dose:  Infused


   


Vancomycin HCl 1.5 gm/ Sodium (Chloride)  500 mls @ 167 mls/hr IV Q24H [facility]


Sodium Chloride (Normal Saline 0.9%)  500 mls @ 20 mls/hr IV Q24H PRN


   PRN Reason: TKO RATE


Ibuprofen (Ibuprofen 400 Mg Tablet)  400 mg PO Q4HR PRN


   PRN Reason: Pain 1 to 4


   Last Admin: 05/09/23 20:26 Dose:  400 mg


   


Insulin Human Lispro (Insulin Lispro 300 Unit/3 Ml Pen)  1 - 9 unit SUBQ 

0800,1200,1700,2100 [facility]; Protocol


   Last Admin: 05/10/23 08:45 Dose:  Not Given


   


Levothyroxine Sodium (Levothyroxine 75 Mcg Tablet)  150 mcg PO QDAC [facility]


   Last Admin: 05/10/23 07:01 Dose:  150 mcg


   


Midodrine (Midodrine 2.5 Mg Tablet)  5 mg PO TID [facility]


   Last Admin: 05/10/23 05:37 Dose:  5 mg


   


Mineral Oil (Min Oil/Dimethicon/Coconut Oil 92 Gm Tube)  1 applic TOP PRN PRN


   PRN Reason: Skin Care


   Last Admin: 05/10/23 05:31 Dose:  1 applic


   


Morphine Sulfate/Sodium Chloride (Morphine Pca 50 Mg)  50 mg IV PCA PRN; 

Protocol


   PRN Reason: Severe Pain (Level 7-10)


   Last Admin: 05/09/23 12:01 Dose:  50 mg


   


Multivitamins/Minerals (Multivitamin W/Minerals Tablet)  1 tab PO DAILYWM [facility]


   Last Admin: 05/10/23 08:45 Dose:  1 tab


   


Naloxone HCl (Naloxone 0.4 Mg/Ml Vial)  0.4 mg IVP PRN PRN


   PRN Reason: respiration <10


Nystatin (Nystatin Powder 15 Gm)  1 applic TOP BID [facility]


   Last Admin: 05/10/23 08:49 Dose:  1 applic


   


Ondansetron HCl (Ondansetron 4 Mg/2 Ml Vial)  4 mg IVP Q4HR PRN


   PRN Reason: Nausea / Vomiting


   Last Admin: 05/08/23 11:06 Dose:  4 mg


   


Polyethylene Glycol (Polyethylene Glycol 3350 17 Gm Packet)  17 gm PO DAILY [facility]


   Last Admin: 05/10/23 08:48 Dose:  17 gm


   


Pregabalin (Pregabalin 100 Mg Capsule)  200 mg PO BID [facility]


   Last Admin: 05/10/23 08:48 Dose:  200 mg


   


Sodium Chloride (Sodium Chloride Flush 0.9% 10 Ml Syringe)  10 ml IVP PRN PRN


   PRN Reason: AS NEEDED PER PROVIDER ORDERS


   Last Admin: 05/08/23 18:23 Dose:  10 ml


   


Sodium Chloride (Sodium Chloride Flush 0.9% 10 Ml Syringe)  10 ml IVP 

0100,0900,1700 [facility]


   Last Admin: 05/10/23 08:49 Dose:  10 ml


   


Sodium Chloride (Sodium Chloride Flush 0.9% 10 Ml Syringe)  20 ml IVP PRN PRN


   PRN Reason: After Blood Draw


   Last Admin: 05/10/23 04:45 Dose:  20 ml


   


Tizanidine HCl (Tizanidine 4 Mg Tablet)  8 mg PO QPM [facility]


   Last Admin: 05/09/23 20:27 Dose:  8 mg


   





                                        





Atorvastatin Calcium 40 mg PO DAILY 02/12/21 


Furosemide [Lasix] 20 mg PO DAILY 02/12/21 


Levothyroxine Sodium [Levothyroxine] 150 mcg PO DAILY 02/12/21 


Methadone [Methadone Hcl] 5 mg PO BID 02/12/21 


Pregabalin [Lyrica] 150 mg PO BID 02/12/21 


Warfarin [Coumadin] 5 mg PO DAILY 02/12/21 


amLODIPine [Norvasc] 5 mg PO DAILY 02/12/21 


tiZANidine [Zanaflex] 8 mg PO QPM 02/12/21 


Metformin HCl [Metformin ER Osmotic] 500 mg PO BID 11/23/21 


Aspirin [Aspirin EC] 1 tab PO DAILY PRN 05/08/23 


Calcium Citrate/Vitamin D3 [Calcium Cit 315-Vit D3 250 Cpt] 2 tab PO DAILY 

05/08/23 


Cholecalciferol [Vitamin D3] 1 tab PO DAILY 05/08/23 


Famotidine [Pepcid] 1 tab PO HS PRN 05/08/23 


Lisinopril [Zestril] 1 tab PO DAILY 05/08/23 


Multivitamin with Minerals [Multivitamins with Minerals] 1 tab PO DAILY 05/08/23




Pregabalin 1 cap PO BID 05/08/23 











Objective





- Vital Signs/Intake & Output


Reviewed Vital Signs: Yes


Vital Signs: 


                                Vital Signs x48h











  Temp Pulse Resp BP Pulse Ox O2 Flow Rate


 


 05/10/23 10:05  36.7 C  44 L  8 L  130/63  98  4


 


 05/10/23 09:54    10 L   


 


 05/10/23 09:15    16   


 


 05/10/23 09:06  36.6 C  55 L  16  119/67  95  4


 


 05/10/23 08:10  36.6 C  54 L  13  125/54 L  97  4


 


 05/10/23 07:00  36.4 C L  54 L  12  125/96 H  95  4


 


 05/10/23 06:06  36.4 C L  43 L  9 L  131/61 H  97  4


 


 05/10/23 06:00  36.4 C L  50 L  15  92/45 L  95  4


 


 05/10/23 05:57    12   


 


 05/10/23 05:00  36.3 C L  45 L  12  97/58 L  95  4


 


 05/10/23 04:00  36.2 C L  41 L  9 L  101/57 L  96  4


 


 05/10/23 03:15    9 L   


 


 05/10/23 03:00  36.2 C L  47 L  9 L  108/75  95  4











Intake & Output: 


                                 Intake & Output











 05/07/23 05/08/23 05/09/23 05/10/23





 23:59 23:59 23:59 23:59


 


Intake Total 1510 5218.33 6529.230 2127.854


 


Output Total 600 1875 1760 1861


 


Balance 910 3343.33 4769.230 266.854














- Objective


General Appearance: positive: Alert (Daughter at the bedside.), Mild distress, 

Other (5 foot 6 elderly female who is 101 kg.  Protuberant abdominal pannus even

when she is laying down.)


Eyes Bilateral: positive: PERRL, EOMI


ENT: positive: No signs of dehydration


Neck: positive: No JVD.  negative: Stiff neck


Respiratory: positive: No respiratory distress, Rales (At the bases.  I have her

give me a good deep cough and within 2 breaths the rales are gone.  So I suspect

she is developing atelectasis.).  negative: Wheezes, Rhonchi


Cardiovascular: positive: Regular rate & rhythm


Abdomen: positive: Nml bowel sounds, No distention, Tenderness (RUQ)


Skin: positive: Warm, Dry


Extremities: positive: Non-tender, Full ROM, Pedal edema (trace)


Neurologic/Psychiatric: positive: Oriented x3, CN's nml (2-12), Motor nml (but 

she is so weak she needs assist to reposition in bed, sit up)





- Lab Results


Fish Bones: 


                                 05/10/23 04:38





                                 05/10/23 04:38


Other Labs: 


                               Lab Results x24hrs











  05/10/23 05/10/23 05/10/23 Range/Units





  07:59 04:38 04:38 


 


WBC    7.9  (4.8-10.8)  x10^3/uL


 


RBC    3.36 L  (4.20-5.40)  10^6/uL


 


Hgb    10.1 L  (12.0-16.0)  g/dL


 


Hct    33.0 L  (37.0-47.0)  %


 


MCV    98.2  (81.0-99.0)  fL


 


MCH    30.1  (27.0-31.0)  pg


 


MCHC    30.6 L  (32.0-36.0)  g/dL


 


RDW    15.0  (12.0-15.0)  %


 


Plt Count    227  (130-450)  10^3/uL


 


MPV    11.2 H  (7.9-10.8)  fL


 


Neut # (Auto)    5.8  (1.5-6.6)  10^3/uL


 


Lymph # (Auto)    1.0 L  (1.5-3.5)  10^3/uL


 


Mono # (Auto)    0.8  (0.0-1.0)  10^3/uL


 


Eos # (Auto)    0.2  (0.0-0.7)  10^3/uL


 


Baso # (Auto)    0.1  (0.0-0.1)  10^3/uL


 


Absolute Nucleated RBC    0.00  x10^3/uL


 


Nucleated RBC %    0.0  /100WBC


 


PT     (9.9-12.6)  secs


 


INR     (0.8-1.2)  


 


VBG pH   7.290 L   (7.31-7.41)  


 


Ionized Calcium   1.05 L   (1.15-1.33)  mmol/L


 


Sodium     (135-145)  mmol/L


 


Potassium     (3.5-5.0)  mmol/L


 


Chloride     (101-111)  mmol/L


 


Carbon Dioxide     (21-32)  mmol/L


 


Anion Gap     (6-13)  


 


BUN     (6-20)  mg/dL


 


Creatinine     (0.4-1.0)  mg/dL


 


Estimated GFR (MDRD)     (>89)  


 


Glucose     ()  mg/dL


 


POC Whole Bld Glucose  132 H    (70 - 100)  mg/dL


 


Calcium     (8.5-10.3)  mg/dL


 


Phosphorus     (2.5-4.6)  mg/dL


 


Magnesium     (1.7-2.8)  mg/dL


 


Total Bilirubin     (0.2-1.0)  mg/dL


 


AST     (10-42)  IU/L


 


ALT     (10-60)  IU/L


 


Alkaline Phosphatase     ()  IU/L


 


Total Protein     (6.7-8.2)  g/dL


 


Albumin     (3.2-5.5)  g/dL


 


Globulin     (2.1-4.2)  g/dL


 


Albumin/Globulin Ratio     (1.0-2.2)  


 


Nasal Screen MRSA (PCR)     (NEGATIVE)  


 


Blood Type     














  05/10/23 05/10/23 05/09/23 Range/Units





  04:38 04:38 20:33 


 


WBC     (4.8-10.8)  x10^3/uL


 


RBC     (4.20-5.40)  10^6/uL


 


Hgb     (12.0-16.0)  g/dL


 


Hct     (37.0-47.0)  %


 


MCV     (81.0-99.0)  fL


 


MCH     (27.0-31.0)  pg


 


MCHC     (32.0-36.0)  g/dL


 


RDW     (12.0-15.0)  %


 


Plt Count     (130-450)  10^3/uL


 


MPV     (7.9-10.8)  fL


 


Neut # (Auto)     (1.5-6.6)  10^3/uL


 


Lymph # (Auto)     (1.5-3.5)  10^3/uL


 


Mono # (Auto)     (0.0-1.0)  10^3/uL


 


Eos # (Auto)     (0.0-0.7)  10^3/uL


 


Baso # (Auto)     (0.0-0.1)  10^3/uL


 


Absolute Nucleated RBC     x10^3/uL


 


Nucleated RBC %     /100WBC


 


PT   20.6 H   (9.9-12.6)  secs


 


INR   1.9 H   (0.8-1.2)  


 


VBG pH     (7.31-7.41)  


 


Ionized Calcium     (1.15-1.33)  mmol/L


 


Sodium  143    (135-145)  mmol/L


 


Potassium  3.2 L    (3.5-5.0)  mmol/L


 


Chloride  110    (101-111)  mmol/L


 


Carbon Dioxide  28    (21-32)  mmol/L


 


Anion Gap  5.0 L    (6-13)  


 


BUN  13    (6-20)  mg/dL


 


Creatinine  0.7    (0.4-1.0)  mg/dL


 


Estimated GFR (MDRD)  82 L    (>89)  


 


Glucose  134 H    ()  mg/dL


 


POC Whole Bld Glucose     (70 - 100)  mg/dL


 


Calcium  7.3 L    (8.5-10.3)  mg/dL


 


Phosphorus  2.5    (2.5-4.6)  mg/dL


 


Magnesium  1.7    (1.7-2.8)  mg/dL


 


Total Bilirubin  1.1 H    (0.2-1.0)  mg/dL


 


AST  70 H    (10-42)  IU/L


 


ALT  52    (10-60)  IU/L


 


Alkaline Phosphatase  303 H    ()  IU/L


 


Total Protein  5.5 L    (6.7-8.2)  g/dL


 


Albumin  2.2 L    (3.2-5.5)  g/dL


 


Globulin  3.3    (2.1-4.2)  g/dL


 


Albumin/Globulin Ratio  0.7 L    (1.0-2.2)  


 


Nasal Screen MRSA (PCR)    NEGATIVE  (NEGATIVE)  


 


Blood Type     














  05/09/23 05/09/23 05/09/23 Range/Units





  20:19 18:20 16:53 


 


WBC     (4.8-10.8)  x10^3/uL


 


RBC     (4.20-5.40)  10^6/uL


 


Hgb     (12.0-16.0)  g/dL


 


Hct     (37.0-47.0)  %


 


MCV     (81.0-99.0)  fL


 


MCH     (27.0-31.0)  pg


 


MCHC     (32.0-36.0)  g/dL


 


RDW     (12.0-15.0)  %


 


Plt Count     (130-450)  10^3/uL


 


MPV     (7.9-10.8)  fL


 


Neut # (Auto)     (1.5-6.6)  10^3/uL


 


Lymph # (Auto)     (1.5-3.5)  10^3/uL


 


Mono # (Auto)     (0.0-1.0)  10^3/uL


 


Eos # (Auto)     (0.0-0.7)  10^3/uL


 


Baso # (Auto)     (0.0-0.1)  10^3/uL


 


Absolute Nucleated RBC     x10^3/uL


 


Nucleated RBC %     /100WBC


 


PT   27.6 H   (9.9-12.6)  secs


 


INR   2.6 H   (0.8-1.2)  


 


VBG pH     (7.31-7.41)  


 


Ionized Calcium     (1.15-1.33)  mmol/L


 


Sodium     (135-145)  mmol/L


 


Potassium     (3.5-5.0)  mmol/L


 


Chloride     (101-111)  mmol/L


 


Carbon Dioxide     (21-32)  mmol/L


 


Anion Gap     (6-13)  


 


BUN     (6-20)  mg/dL


 


Creatinine     (0.4-1.0)  mg/dL


 


Estimated GFR (MDRD)     (>89)  


 


Glucose     ()  mg/dL


 


POC Whole Bld Glucose  139 H   112 H  (70 - 100)  mg/dL


 


Calcium     (8.5-10.3)  mg/dL


 


Phosphorus     (2.5-4.6)  mg/dL


 


Magnesium     (1.7-2.8)  mg/dL


 


Total Bilirubin     (0.2-1.0)  mg/dL


 


AST     (10-42)  IU/L


 


ALT     (10-60)  IU/L


 


Alkaline Phosphatase     ()  IU/L


 


Total Protein     (6.7-8.2)  g/dL


 


Albumin     (3.2-5.5)  g/dL


 


Globulin     (2.1-4.2)  g/dL


 


Albumin/Globulin Ratio     (1.0-2.2)  


 


Nasal Screen MRSA (PCR)     (NEGATIVE)  


 


Blood Type     














  05/09/23 05/07/23 Range/Units





  12:24 19:13 


 


WBC    (4.8-10.8)  x10^3/uL


 


RBC    (4.20-5.40)  10^6/uL


 


Hgb    (12.0-16.0)  g/dL


 


Hct    (37.0-47.0)  %


 


MCV    (81.0-99.0)  fL


 


MCH    (27.0-31.0)  pg


 


MCHC    (32.0-36.0)  g/dL


 


RDW    (12.0-15.0)  %


 


Plt Count    (130-450)  10^3/uL


 


MPV    (7.9-10.8)  fL


 


Neut # (Auto)    (1.5-6.6)  10^3/uL


 


Lymph # (Auto)    (1.5-3.5)  10^3/uL


 


Mono # (Auto)    (0.0-1.0)  10^3/uL


 


Eos # (Auto)    (0.0-0.7)  10^3/uL


 


Baso # (Auto)    (0.0-0.1)  10^3/uL


 


Absolute Nucleated RBC    x10^3/uL


 


Nucleated RBC %    /100WBC


 


PT    (9.9-12.6)  secs


 


INR    (0.8-1.2)  


 


VBG pH    (7.31-7.41)  


 


Ionized Calcium    (1.15-1.33)  mmol/L


 


Sodium    (135-145)  mmol/L


 


Potassium    (3.5-5.0)  mmol/L


 


Chloride    (101-111)  mmol/L


 


Carbon Dioxide    (21-32)  mmol/L


 


Anion Gap    (6-13)  


 


BUN    (6-20)  mg/dL


 


Creatinine    (0.4-1.0)  mg/dL


 


Estimated GFR (MDRD)    (>89)  


 


Glucose    ()  mg/dL


 


POC Whole Bld Glucose  139 H   (70 - 100)  mg/dL


 


Calcium    (8.5-10.3)  mg/dL


 


Phosphorus    (2.5-4.6)  mg/dL


 


Magnesium    (1.7-2.8)  mg/dL


 


Total Bilirubin    (0.2-1.0)  mg/dL


 


AST    (10-42)  IU/L


 


ALT    (10-60)  IU/L


 


Alkaline Phosphatase    ()  IU/L


 


Total Protein    (6.7-8.2)  g/dL


 


Albumin    (3.2-5.5)  g/dL


 


Globulin    (2.1-4.2)  g/dL


 


Albumin/Globulin Ratio    (1.0-2.2)  


 


Nasal Screen MRSA (PCR)    (NEGATIVE)  


 


Blood Type   B POSITIVE  














ABX Reporting


Has patient been on IV antibiotics over the past 48 hours?: Yes





Assessment/Plan





- Problem List


(1) Supratherapeutic INR


Impression: 


When the patient was admitted she was admitted with an INR of greater than 10.  

She takes medication for atrial fibrillation which includes Coumadin.  She was 

taking her medication in spite of not having any p.o. intake for 4 days.  

Between the liver being replaced by cancer, dehydration, and taking her 

Coumadin, she was supratherapeutic.  She received FFP on admission.  On the 

second day her INR was 4.7 and she received vitamin K.  Yesterday, the third 

day, her INR was 4.3 and she received more vitamin K.  We are attempting to get 

her down to less than 1.5.  She needs this for radiology for liver biopsy.





This morning her INR was 1.9.  Radiology has counseled her biopsy and has asked 

that I treat her again.  Family is understandably worried and upset.  They 

really want this biopsy to see what the diagnosis is going to be to them let 

oncology know what treatment would be.





After discussion with the radiologist on today, I am going to be giving her FFP.

 Repeating her INR stat an hour later.  And then hopefully that will be below 

1.5 to can continue to get the biopsy today.








(2) Hypotension


Impression: 


She was transferred to the ICU May 9 for her hypotension.  In this unfortunate 

female, hypotension could be from the disease process in her liver.  CT scan 

shows extensive, multiple, masses in the liver.  A left paracolic gutter nodule,

and lymphadenopathy.  She does not appear to have signs and symptoms of sepsis 

with the source other than hypotension.  There is no pulmonary, GI, or  

findings other than pericholecystic inflammation on CT done on admission.  She 

is tender in the right upper quadrant.  She is not having an MI.  She does not 

have a GI bleed but could be bleeding in her liver due to mets and an elevated 

INR (but no tachycardia on vitals).  She is not intravascularly depleted with 

dehydration





Treatment consisted of Levophed to keep mean arterial pressure at 65. The repeat

abdomen pelvis CT done yesterday for the intractable right upper quadrant pain 

has no evidence of obstruction.  She continues to have left paracolic gutter 

lesion, a liver that is filled with innumerable masses.  She has nonspecific 

pericholecystic fluid and wall thickening.  The biliary tree and pancreatic duct

are prominent without definite pathologic dilation or obstruction.  Possible 

small splenic cyst.  Pathologic appearing lymph nodes.  Pelvis has a small 

amount of free fluid.  Elias is in place with an under distended bladder.  She 

had a hysterectomy.





Since infection could not be completely ruled out,  I started her on broad-

spectrum antibiotics with cefepime, vancomycin, and Flagyl.  Today is day #2.





She did have blood cultures on May 7.  Those were positive today with gram-

positive cocci.  PCR has staph species.  Since they became +3 days after they 

were done, I suspect this is contamination. I also ordered blood cultures on May

9.  Those have been received and are in process.





She is still requiring Levophed.  Not really responding to the bolus of IV 

fluids, nor the Levophed.





Plan:


I will continue the antibiotics.  Review the blood cultures done from yesterday 

when they are available.


We will work with the Levophed today to see if I can take her off of it.  She is

otherwise comfortable.





(3) RUQ abdominal pain


Impression: 


This unfortunate female has already had imaging studies that tell us that she 

has multiple, multiple masses to her liver.  The presumptive thought is that she

has metastatic disease of unknown primary.  There is nodularity along the 

capsule as well as the parenchyma.  Gallbladder is unremarkable.  No 

hydronephrosis.  This was seen on the admission abdomen/pelvis CT May 7.  That 

was done without contrast.  Abdominal ultrasound also shows innumerable 

hypoechoic mass lesions.  A representative large complex mass in the posterior 

right hepatic lobe measures 6.2 cm.  The intrahepatic ducts appear nondilated.  

There is dilation of the extrahepatic duct with the common bile duct 1 cm (think

primary hepatobiliary w mets or a passed stone).  Her total bili was 1.7 on 

admission.  It is 1.1 today.  AST was 206 on admission and is 66 today.  ALT was

74 on admission and is now 45 today.  Alk phos was 311 and is 220 today.  

Ammonia level is 15.3.





The radiologist and I discussed the previous CT.  And our plans moving forward. 

Our obvious suspicion is that she has cancer, either hepatobiliary of mets.  Or 

unknown primary with mets.  I am hoping for a pathologic piece of tissue to give

us the diagnosis. 





I was worried that she may be bleeding in her liver causing such new intractable

pain.  After I reviewed her liver function studies, I also thought she could be 

having gallbladder symptoms even though she did not have stones on the admission

CT.  If her elevated liver function studies were from tumor, they should not be 

going down.  And they are going down. Radiologist is worried that they do not 

have a contrasted CAT scan to let him know if they will be getting into trouble 

with an angioma.





So I ordered a multiphase CT of the abdomen yesterday.  There is no hemangioma. 

No bleeding under the capsule. The liver masses were much better delineated for 

the radiologist.  We agreed that as long as I can get her INR below 2 she could 

go to biopsy today.





However, we reviewed the literature with today's radiologist.  Their guidelines 

state that the INR needs to be below 1.5.  See problem #1.





Her pain is better with the morphine PCA pump.  I have ordered a 1 mg/h 

continuous infusion rate.  I am allowing her 1 mg every 10 minutes with a max of

8 mg in 4 hours





Plan:


Continue above PCA pump


Palliative care consult to help me adjust the pump and converted to p.o. meds.  

She cannot stand the PCA pump and definitely will eventually need to go home


FFP 1 unit stat followed by stat INR


Liver biopsy today








(4) Altered mental status


Impression: 


She has been steadily improving.  By May 9 she was alert enough to process the 

information I was telling her and be appropriately grief stricken and fearful 

about the cancer diagnosis.  She was able to tell me what she was feeling, able 

to describe her pain well.  , who was at the bedside yesterday, endorsed 

that she was the best she has been since being in the hospital.





She continues to be fatigued, weak, but is oriented to person place and time.





Plan:


Most likely her altered mental status was from the dehydration and the pain of 

liver mets.  PT and OT have been ordered but I am holding off on their 

evaluation and management until she can get out of bed and not be a PCA pump.  I

am hoping that will be tomorrow.





(5) Acute kidney injury resolved. 


Labs were all reviewed.  She normally runs a creatinine of 0.8.  With her 

history of 4 days of poor oral intake, she is dehydrated and this caused her 

BEN.  Her BUN/creatinine on admission were 41/2.3. Not only was her lisinopril 

stopped, she was started on IV fluids and >> 34/1.6>>18/0.9>>13/0.7 today. 


Plan:


Avoid nephrotoxins


Continue with IV fluids


Follow BMP daily





(5) Orthostatic hypotension


First set of orthostatic vital signs were done before the patient starts 

participating w/ PT and they were abnormal: Supine /62, HR 72.  Sitting BP

108/67, HR 80.  Standing BP 89/63, HR 89.


This only confirmed the intravascular depletion from dehydration. 5/9, with her 

severe hypotension in the 70s systolic, she was transferred to the ICU and 

started on Levophed.


Plan:


Hold off on working with PT for now. Stop orthostatic BP checks for now.  

Continue to monitor her blood pressure and when her blood pressure is no longer 

hypotensive she can start with therapy.





(7) Liver masses


Her CT scan then the ultrasound done  showed "innumerable" masses in the liver. 

There is one particularly large mass measuring 6.2 cm in the right posterior 

lobe.


The ED provider was the first 1 to tell the patient and  that she 

probably has cancer. We had planned to get tissue sample for pathology.  

Conversation was held with radiology on May 8 and we needed to normalize her 

INR.  On May 8 she received vitamin K and FFP.  In spite of that, her INR is 

still over 4 today.  Today's radiologist wanted better imaging with contrast and

a repeat CT was done.  Not only does she have the same liver masses, she appears

to have nodules in the paracolic gutter indicating carcinomatosis.  

Lymphadenopathy.  She is still not ready for biopsy because of INR.  I am also 

noting that she has changes of inflammation on her gallbladder.  Could you have 

passed a stone?  Could she have primary hepatobiliary cancer that has 

metastasized.





In speaking to radiology I repeated the CT of abdomen and used contrast 5/9,  

and the repeat CAT scan has been helpful so he does not have to worry about a 

hemangioma.





I also spoke to oncology in the medical ambulatory clinic 5/9.  I presented the 

case to the oncologist.  Her hypotension and possible sepsis is definitely 

concerning.  But they feel that if we can get her through this temporary phase 

of illness, and have a tissue diagnosis, there are therapies that could allow 

her a decent quality of life in an extended life of a few years.  They feel it 

is worth getting a liver biopsy and moving forward with at least getting a 

tissue diagnosis.





I also spoke to the patient about [employer] [facility] on 5/9.  She really did understand

the possibility that she could have a terminal illness and she was 

understandably tearful and grief stricken.  This is all happening so fast.  I 

discussed what the resuscitative effort would mean with regards to CPR and 

intubation.  She states that she does not want that.  While she is willing to go

to do treatment, get a biopsy, and see how she does, she feels that a full on 

resuscitative measures with CPR and intubation would not be what she wants.  

"What would be the point" is her statement. So CODE STATUS was changed to DO NOT

RESUSCITATE.





Some of the tumor markers have already come back.  Alpha-fetoprotein is 1.8.  

Normal levels.  CA 15-3 is 623.  High.  CA 19-9 antigen is pending.  Ca1 25 

antigen is 14 and within normal range.  CEA is 1633.5.





Plan:


Tumor markers as already discussed above


Vitamin K 10 mg p.o. today again


FFP again


She has a POLST form at the bedside that she would like me to fill out with her.

 I will be doing that today.  She is asked that her  and daughter Juhi 

be the points of contact for the POLST form





(8) Transaminitis


The family was all in the room and reported that the patient gets yearly 

thorough blood tests.  These were done in October 2022 and the LFTs were normal 

then ( I reviewed all labs in this EMR).


I questioned her about alcohol use and she hardly uses any.  The  and 

other family members concur. In following her liver function studies daily, they

are improving.  If she truly had LFTs that were elevated because of tumor, the 

LFTs would not be getting better.  Since they are getting better it makes me 

wonder if she has a transitory process such as cholecystitis or a passed stone 

giving her the right upper quadrant pain that so severe today. I reiterated to 

them that her liver function studies could be normal in spite of having tumor in

the liver.  I really do not think that something was missed in October on the 

basis of labs.


Plan:


Follow LFTs daily


Avoid hepatotoxins


Work-up of the liver mets as described in #3 above


Started on antibiotics








(8) DM II


The patient takes metformin.  The  admitted she does not really follow a 

diabetic diet. Our treatment consist of ACHS fingerstick checks and sliding 

scale insulin coverage for this. A1c is 7.1%.  


5/8 her glucose was 96, 124, 154, 131.


5/9 her glucose was 108, 139. , 112, 139


Today her glucose is 132


 She is eating 25 to 50% of her food. She says that she appreciates her diet 

being changed to regular diet on May 9.  She really did not like the dysphagia 

pured diet.


Plan:


Continue with ACHS fingerstick checks and sliding scale insulin coverage





(9) Chronic pain


I asked the patient and  why she is on methadone.  There is a history of 

previously needing to be on oxycodone at very high doses which was then 

transitioned to methadone.  Her chronic pain was in her neck and lower back. 


5/9 her right upper quadrant pain is so severe that she is requiring doses of 

morphine IV that are not holding her.  It is not her diffuse pain that is 

bothering her.


I stopped the methadone and started her on a PCA pump yesterday. Her pain is 

better controlled with the PCA pump.





Plan:


PCA pump as noted above





(10) Hypothyroidism


Her TSH is elevated but free T4 is normal.


Plan:


Continue with her same home thyroid replacement dose





(11) Hx of CVA


Patient has had 3 strokes, 2003, 2013 and 2014.  The chart indicates she has a 

"intracardiac shunt". She has chronic weakness on one side.  She uses walk


She is on chronic warfarin since those strokes


Plan:


Eventually she will need PT and OT evaluations.





(12)  BOBBI





Plan: I have reiterated to her family that her home machine needs to be brought 

in here.  Especially in view of the fact of the morphine use.

## 2023-05-11 LAB
BASOPHILS NFR BLD AUTO: 0 10^3/UL (ref 0–0.1)
BASOPHILS NFR BLD AUTO: 0.3 %
CA-I SERPL ISE-MCNC: 1.07 MMOL/L (ref 1.15–1.33)
CA-I SERPL ISE-MCNC: 1.11 MMOL/L (ref 1.15–1.33)
EOSINOPHIL # BLD AUTO: 0 10^3/UL (ref 0–0.7)
EOSINOPHIL NFR BLD AUTO: 0 %
ERYTHROCYTE [DISTWIDTH] IN BLOOD BY AUTOMATED COUNT: 15.1 % (ref 12–15)
HCT VFR BLD AUTO: 33.6 % (ref 37–47)
HGB UR QL STRIP: 10.2 G/DL (ref 12–16)
INR PPP: 1.9 (ref 0.8–1.2)
LYMPHOCYTES # SPEC AUTO: 0.6 10^3/UL (ref 1.5–3.5)
LYMPHOCYTES NFR BLD AUTO: 6.3 %
MAGNESIUM SERPL-MCNC: 1.7 MG/DL (ref 1.7–2.8)
MAGNESIUM SERPL-MCNC: 2 MG/DL (ref 1.7–2.8)
MCH RBC QN AUTO: 29.7 PG (ref 27–31)
MCHC RBC AUTO-ENTMCNC: 30.4 G/DL (ref 32–36)
MCV RBC AUTO: 98 FL (ref 81–99)
MONOCYTES # BLD AUTO: 0.7 10^3/UL (ref 0–1)
MONOCYTES NFR BLD AUTO: 7.9 %
NEUTROPHILS # BLD AUTO: 8 10^3/UL (ref 1.5–6.6)
NEUTROPHILS # SNV AUTO: 9.3 X10^3/UL (ref 4.8–10.8)
NEUTROPHILS NFR BLD AUTO: 85.2 %
NRBC # BLD AUTO: 0 /100WBC
NRBC # BLD AUTO: 0 X10^3/UL
PDW BLD AUTO: 11.1 FL (ref 7.9–10.8)
PH BLDV: 7.31 [PH] (ref 7.31–7.41)
PH BLDV: 7.31 [PH] (ref 7.31–7.41)
PHOSPHATE BLD-MCNC: 2.9 MG/DL (ref 2.5–4.6)
PLATELET # BLD: 233 10^3/UL (ref 130–450)
POTASSIUM SERPL-SCNC: 3.5 MMOL/L (ref 3.5–5)
PROTHROM ACT/NOR PPP: 20.4 SECS (ref 9.9–12.6)
RBC MAR: 3.43 10^6/UL (ref 4.2–5.4)

## 2023-05-11 RX ADMIN — MIDODRINE HYDROCHLORIDE SCH MG: 2.5 TABLET ORAL at 14:28

## 2023-05-11 RX ADMIN — SODIUM CHLORIDE SCH MLS/HR: 9 INJECTION, SOLUTION INTRAVENOUS at 04:57

## 2023-05-11 RX ADMIN — SODIUM CHLORIDE, PRESERVATIVE FREE SCH ML: 5 INJECTION INTRAVENOUS at 04:56

## 2023-05-11 RX ADMIN — LEVOTHYROXINE SODIUM SCH MCG: 75 TABLET ORAL at 05:47

## 2023-05-11 RX ADMIN — INSULIN LISPRO SCH UNIT: 100 INJECTION, SOLUTION INTRAVENOUS; SUBCUTANEOUS at 20:55

## 2023-05-11 RX ADMIN — SODIUM CHLORIDE, PRESERVATIVE FREE SCH ML: 5 INJECTION INTRAVENOUS at 08:33

## 2023-05-11 RX ADMIN — SODIUM CHLORIDE SCH MLS/HR: 900 INJECTION INTRAVENOUS at 21:57

## 2023-05-11 RX ADMIN — INSULIN LISPRO SCH UNIT: 100 INJECTION, SOLUTION INTRAVENOUS; SUBCUTANEOUS at 17:12

## 2023-05-11 RX ADMIN — SODIUM CHLORIDE, PRESERVATIVE FREE SCH ML: 5 INJECTION INTRAVENOUS at 17:11

## 2023-05-11 RX ADMIN — DIMETHICONE PRN APPLIC: 13000 CREAM TOPICAL at 21:14

## 2023-05-11 RX ADMIN — NOREPINEPHRINE BITARTRATE SCH: 8 INJECTION, SOLUTION INTRAVENOUS at 04:55

## 2023-05-11 RX ADMIN — PREGABALIN SCH MG: 100 CAPSULE ORAL at 21:00

## 2023-05-11 RX ADMIN — SODIUM CHLORIDE SCH MLS/HR: 9 INJECTION, SOLUTION INTRAVENOUS at 14:32

## 2023-05-11 RX ADMIN — FAMOTIDINE SCH MG: 20 TABLET, FILM COATED ORAL at 21:18

## 2023-05-11 RX ADMIN — INSULIN LISPRO SCH UNIT: 100 INJECTION, SOLUTION INTRAVENOUS; SUBCUTANEOUS at 12:00

## 2023-05-11 RX ADMIN — POTASSIUM CHLORIDE SCH MLS/HR: 14.9 INJECTION, SOLUTION INTRAVENOUS at 15:54

## 2023-05-11 RX ADMIN — Medication SCH TAB: at 08:33

## 2023-05-11 RX ADMIN — POTASSIUM CHLORIDE SCH MLS/HR: 14.9 INJECTION, SOLUTION INTRAVENOUS at 07:36

## 2023-05-11 RX ADMIN — SODIUM CHLORIDE SCH MLS/HR: 9 INJECTION, SOLUTION INTRAVENOUS at 17:10

## 2023-05-11 RX ADMIN — POTASSIUM CHLORIDE SCH MLS/HR: 14.9 INJECTION, SOLUTION INTRAVENOUS at 08:42

## 2023-05-11 RX ADMIN — SODIUM CHLORIDE SCH MLS/HR: 900 INJECTION INTRAVENOUS at 05:39

## 2023-05-11 RX ADMIN — SODIUM CHLORIDE SCH MLS/HR: 900 INJECTION INTRAVENOUS at 14:29

## 2023-05-11 RX ADMIN — PREGABALIN SCH MG: 100 CAPSULE ORAL at 08:33

## 2023-05-11 RX ADMIN — NYSTATIN SCH APPLIC: 100000 POWDER TOPICAL at 21:15

## 2023-05-11 RX ADMIN — MIDODRINE HYDROCHLORIDE SCH MG: 2.5 TABLET ORAL at 22:14

## 2023-05-11 RX ADMIN — NOREPINEPHRINE BITARTRATE SCH MLS/HR: 8 INJECTION, SOLUTION INTRAVENOUS at 15:54

## 2023-05-11 RX ADMIN — Medication SCH MCG: at 08:32

## 2023-05-11 RX ADMIN — NYSTATIN SCH APPLIC: 100000 POWDER TOPICAL at 08:33

## 2023-05-11 RX ADMIN — MIDODRINE HYDROCHLORIDE SCH MG: 2.5 TABLET ORAL at 05:39

## 2023-05-11 RX ADMIN — INSULIN LISPRO SCH UNIT: 100 INJECTION, SOLUTION INTRAVENOUS; SUBCUTANEOUS at 08:31

## 2023-05-11 RX ADMIN — SODIUM CHLORIDE, PRESERVATIVE FREE PRN ML: 5 INJECTION INTRAVENOUS at 04:56

## 2023-05-11 RX ADMIN — FAMOTIDINE SCH MG: 20 TABLET, FILM COATED ORAL at 08:33

## 2023-05-11 RX ADMIN — DOCUSATE SODIUM SCH MG: 250 CAPSULE, LIQUID FILLED ORAL at 12:00

## 2023-05-11 RX ADMIN — POTASSIUM CHLORIDE SCH MLS/HR: 14.9 INJECTION, SOLUTION INTRAVENOUS at 17:09

## 2023-05-11 RX ADMIN — Medication SCH MG: at 08:32

## 2023-05-11 NOTE — PROVIDER PROGRESS NOTE
Subjective





- Prog Note Date


Prog Note Date: 05/11/23


Prog Note Time: 16:11





- Subjective


Subjective: 


Tired.  But no new events.  Blood pressure stays stable on the Levophed.  She is

down to 2 mcg.  She has been consistently stable or elevated today.  Yesterday 

she had her moments where she was 109/62 or 105/69 but then she come back up.  

Starting at about 11:00 last night to today she is 120s, 130s, and occasionally 

150s.





There is no fever, cough, chest congestion.





The main problem continues to be the right upper quadrant pain but that is 

controlled with the PCA pump.








Current Medications





- Current Medications


Current Medications: 





Active Medications





Aspirin (Aspirin Ec 325 Mg Tablet)  325 mg PO DAILY PRN


   PRN Reason: PAIN 1-4


Calcium Citrate (Calcium Citrate 250 Mg Tablet)  500 mg PO DAILY [facility]


   Last Admin: 05/11/23 08:32 Dose:  500 mg


   


Cholecalciferol (Cholecalciferol 25 Mcg Tablet)  25 mcg PO DAILY [facility]


   Last Admin: 05/11/23 08:32 Dose:  25 mcg


   


Dexamethasone (Dexamethasone 4 Mg Tablet)  2 mg PO BIDWM [facility]


   Last Admin: 05/11/23 08:32 Dose:  2 mg


   


Docusate Sodium (Docusate Sodium 250 Mg Capsule)  250 - 500 mg PO DAILY [facility]


   Last Admin: 05/11/23 12:00 Dose:  250 mg


   


Famotidine (Famotidine 20 Mg Tablet)  20 mg PO BID [facility]


   Last Admin: 05/11/23 08:33 Dose:  20 mg


   


Sodium Chloride (Normal Saline 0.9%)  1,000 mls @ 125 mls/hr IV .Q8H [facility]


   Last Admin: 05/11/23 14:32 Dose:  125 mls/hr


   


Norepinephrine Bitartrate (Levophed 8 Mg/250 Ml D5w)  8 mg in 250 mls @ 15 

mls/hr IV .[id number] [facility]; Protocol


   Last Admin: 05/11/23 15:54 Dose:  1 mcg/min, 1.875 mls/hr


   


Cefepime HCl 2 gm/ Sodium (Chloride)  100 mls @ 200 mls/hr IV TID [facility]


   Last Infusion: 05/11/23 15:31 Dose:  Infused


   


Metronidazole (Flagyl 500 Mg/100 Ml)  500 mg in 100 mls @ 100 mls/hr IV TID [facility]


   Last Infusion: 05/11/23 15:55 Dose:  Infused


   


Vancomycin HCl 1.5 gm/ Sodium (Chloride)  500 mls @ 167 mls/hr IV Q24H [facility]


   Last Infusion: 05/10/23 22:20 Dose:  Infused


   


Sodium Chloride (Normal Saline 0.9%)  500 mls @ 20 mls/hr IV Q24H PRN


   PRN Reason: TKO RATE


   Last Admin: 05/11/23 06:30 Dose:  20 mls/hr


   


Ibuprofen (Ibuprofen 400 Mg Tablet)  400 mg PO Q4HR PRN


   PRN Reason: Pain 1 to 4


   Last Admin: 05/09/23 20:26 Dose:  400 mg


   


Insulin Human Lispro (Insulin Lispro 300 Unit/3 Ml Pen)  1 - 9 unit SUBQ 

0800,1200,1700,2100 [facility]; Protocol


   Last Admin: 05/11/23 12:00 Dose:  5 unit


   


Levothyroxine Sodium (Levothyroxine 75 Mcg Tablet)  150 mcg PO QDAC [facility]


   Last Admin: 05/11/23 05:47 Dose:  150 mcg


   


Midodrine (Midodrine 2.5 Mg Tablet)  5 mg PO TID [facility]


   Last Admin: 05/11/23 14:28 Dose:  5 mg


   


Mineral Oil (Min Oil/Dimethicon/Coconut Oil 92 Gm Tube)  1 applic TOP PRN PRN


   PRN Reason: Skin Care


   Last Admin: 05/10/23 21:34 Dose:  1 applic


   


Morphine Sulfate/Sodium Chloride (Morphine Pca 50 Mg)  50 mg IV PCA PRN; 

Protocol


   PRN Reason: Severe Pain (Level 7-10)


   Last Admin: 05/10/23 17:21 Dose:  50 mg


   


Multivitamins/Minerals (Multivitamin W/Minerals Tablet)  1 tab PO DAILYWM [facility]


   Last Admin: 05/11/23 08:33 Dose:  1 tab


   


Naloxone HCl (Naloxone 0.4 Mg/Ml Vial)  0.4 mg IVP PRN PRN


   PRN Reason: respiration <10


Nystatin (Nystatin Powder 15 Gm)  1 applic TOP BID [facility]


   Last Admin: 05/11/23 08:33 Dose:  1 applic


   


Ondansetron HCl (Ondansetron 4 Mg/2 Ml Vial)  4 mg IVP Q4HR PRN


   PRN Reason: Nausea / Vomiting


   Last Admin: 05/08/23 11:06 Dose:  4 mg


   


Polyethylene Glycol (Polyethylene Glycol 3350 17 Gm Packet)  17 gm PO DAILY [facility]


   Last Admin: 05/11/23 08:33 Dose:  17 gm


   


Pregabalin (Pregabalin 100 Mg Capsule)  200 mg PO BID [facility]


   Last Admin: 05/11/23 08:33 Dose:  200 mg


   


Sodium Chloride (Sodium Chloride Flush 0.9% 10 Ml Syringe)  10 ml IVP PRN PRN


   PRN Reason: AS NEEDED PER PROVIDER ORDERS


   Last Admin: 05/08/23 18:23 Dose:  10 ml


   


Sodium Chloride (Sodium Chloride Flush 0.9% 10 Ml Syringe)  10 ml IVP 

0100,0900,1700 [facility]


   Last Admin: 05/11/23 08:33 Dose:  10 ml


   


Sodium Chloride (Sodium Chloride Flush 0.9% 10 Ml Syringe)  20 ml IVP PRN PRN


   PRN Reason: After Blood Draw


   Last Admin: 05/11/23 04:56 Dose:  20 ml


   


Tizanidine HCl (Tizanidine 4 Mg Tablet)  8 mg PO QPM [facility]


   Last Admin: 05/10/23 20:38 Dose:  8 mg


   





                                        





Atorvastatin Calcium 40 mg PO DAILY 02/12/21 


Furosemide [Lasix] 20 mg PO DAILY 02/12/21 


Levothyroxine Sodium [Levothyroxine] 150 mcg PO DAILY 02/12/21 


Methadone [Methadone Hcl] 5 mg PO BID 02/12/21 


Pregabalin [Lyrica] 150 mg PO BID 02/12/21 


Warfarin [Coumadin] 5 mg PO DAILY 02/12/21 


amLODIPine [Norvasc] 5 mg PO DAILY 02/12/21 


tiZANidine [Zanaflex] 8 mg PO QPM 02/12/21 


Metformin HCl [Metformin ER Osmotic] 500 mg PO BID 11/23/21 


Aspirin [Aspirin EC] 1 tab PO DAILY PRN 05/08/23 


Calcium Citrate/Vitamin D3 [Calcium Cit 315-Vit D3 250 Cpt] 2 tab PO DAILY 

05/08/23 


Cholecalciferol [Vitamin D3] 1 tab PO DAILY 05/08/23 


Famotidine [Pepcid] 1 tab PO HS PRN 05/08/23 


Lisinopril [Zestril] 1 tab PO DAILY 05/08/23 


Multivitamin with Minerals [Multivitamins with Minerals] 1 tab PO DAILY 05/08/23




Pregabalin 1 cap PO BID 05/08/23 











Objective





- Vital Signs/Intake & Output


Reviewed Vital Signs: Yes


Vital Signs: 


                                Vital Signs x48h











  Temp Pulse Resp BP Pulse Ox O2 Flow Rate


 


 05/11/23 15:00  36.4 C L  48 L  13  136/76 H  98  4


 


 05/11/23 14:09   52 L   121/62  


 


 05/11/23 14:00  36.4 C L  52 L  12  147/131 H  92  4


 


 05/11/23 13:00  36.4 C L  50 L  10 L  137/73 H  99  4


 


 05/11/23 12:00   44 L  12  139/95 H  98  4


 


 05/11/23 11:00  36.2 C L  43 L  8 L  143/72 H  99  4


 


 05/11/23 10:07    15   


 


 05/11/23 10:00  36.0 C L  47 L  10 L  126/68  100  4


 


 05/11/23 09:30    12   


 


 05/11/23 09:00  36.0 C L  50 L  12  129/71  100  4


 


 05/11/23 08:18    10 L   


 


 05/11/23 08:00  36.2 C L  43 L  9 L  137/75 H  99  4











Intake & Output: 


                                 Intake & Output











 05/08/23 05/09/23 05/10/23 05/11/23





 23:59 23:59 23:59 23:59


 


Intake Total 5218.33 6529.230 5500.083 2837.500


 


Output Total 1875 1760 2836 2275


 


Balance 3343.33 4769.230 2664.083 562.500














- Objective


General Appearance: positive: Alert, Mild distress (From right upper quadrant 

pain), Other (Moderately deaf, but if you been in close and speak directly to 

her she can hear what you are saying and processes appropriately)


Eyes Bilateral: positive: PERRL, EOMI


ENT: positive: No signs of dehydration


Neck: positive: No JVD.  negative: Stiff neck


Respiratory: positive: No respiratory distress, Rhonchi.  negative: Wheezes, 

Rales


Cardiovascular: positive: Regular rate & rhythm


Abdomen: positive: Nml bowel sounds, Tenderness (Right upper quadrant.  2 days 

ago it was exquisite and she was crying out with palpation.  Today she feels 

like it is still present but not agonizingly painful), Other (Eating 0% and as 

high as 75% of her food.Last bowel movement May 5).  negative: Guarding, Rebound


Skin: positive: Warm, Dry, Pallor


Extremities: positive: Full ROM, Pedal edema


Neurologic/Psychiatric: positive: Oriented x3, CN's nml (2-12) (Except the 

deafness.), Motor nml





- Lab Results


Fish Bones: 


                                 05/11/23 04:36





                                 05/11/23 12:18


Other Labs: 


                               Lab Results x24hrs











  05/11/23 05/11/23 05/11/23 Range/Units





  12:18 12:18 11:41 


 


WBC     (4.8-10.8)  x10^3/uL


 


RBC     (4.20-5.40)  10^6/uL


 


Hgb     (12.0-16.0)  g/dL


 


Hct     (37.0-47.0)  %


 


MCV     (81.0-99.0)  fL


 


MCH     (27.0-31.0)  pg


 


MCHC     (32.0-36.0)  g/dL


 


RDW     (12.0-15.0)  %


 


Plt Count     (130-450)  10^3/uL


 


MPV     (7.9-10.8)  fL


 


Neut # (Auto)     (1.5-6.6)  10^3/uL


 


Lymph # (Auto)     (1.5-3.5)  10^3/uL


 


Mono # (Auto)     (0.0-1.0)  10^3/uL


 


Eos # (Auto)     (0.0-0.7)  10^3/uL


 


Baso # (Auto)     (0.0-0.1)  10^3/uL


 


Absolute Nucleated RBC     x10^3/uL


 


Nucleated RBC %     /100WBC


 


PT     (9.9-12.6)  secs


 


INR     (0.8-1.2)  


 


VBG pH  7.307 L    (7.31-7.41)  


 


Ionized Calcium  1.11 L    (1.15-1.33)  mmol/L


 


Potassium   3.5   (3.5-5.0)  mmol/L


 


POC Whole Bld Glucose    240 H  (70 - 100)  mg/dL


 


Phosphorus     (2.5-4.6)  mg/dL


 


Magnesium   2.0   (1.7-2.8)  mg/dL














  05/11/23 05/11/23 05/11/23 Range/Units





  08:00 04:36 04:36 


 


WBC     (4.8-10.8)  x10^3/uL


 


RBC     (4.20-5.40)  10^6/uL


 


Hgb     (12.0-16.0)  g/dL


 


Hct     (37.0-47.0)  %


 


MCV     (81.0-99.0)  fL


 


MCH     (27.0-31.0)  pg


 


MCHC     (32.0-36.0)  g/dL


 


RDW     (12.0-15.0)  %


 


Plt Count     (130-450)  10^3/uL


 


MPV     (7.9-10.8)  fL


 


Neut # (Auto)     (1.5-6.6)  10^3/uL


 


Lymph # (Auto)     (1.5-3.5)  10^3/uL


 


Mono # (Auto)     (0.0-1.0)  10^3/uL


 


Eos # (Auto)     (0.0-0.7)  10^3/uL


 


Baso # (Auto)     (0.0-0.1)  10^3/uL


 


Absolute Nucleated RBC     x10^3/uL


 


Nucleated RBC %     /100WBC


 


PT     (9.9-12.6)  secs


 


INR     (0.8-1.2)  


 


VBG pH    7.310  (7.31-7.41)  


 


Ionized Calcium    1.07 L  (1.15-1.33)  mmol/L


 


Potassium   3.2 L   (3.5-5.0)  mmol/L


 


POC Whole Bld Glucose  163 H    (70 - 100)  mg/dL


 


Phosphorus     (2.5-4.6)  mg/dL


 


Magnesium     (1.7-2.8)  mg/dL














  05/11/23 05/11/23 05/11/23 Range/Units





  04:36 04:36 04:36 


 


WBC   9.3   (4.8-10.8)  x10^3/uL


 


RBC   3.43 L   (4.20-5.40)  10^6/uL


 


Hgb   10.2 L   (12.0-16.0)  g/dL


 


Hct   33.6 L   (37.0-47.0)  %


 


MCV   98.0   (81.0-99.0)  fL


 


MCH   29.7   (27.0-31.0)  pg


 


MCHC   30.4 L   (32.0-36.0)  g/dL


 


RDW   15.1 H   (12.0-15.0)  %


 


Plt Count   233   (130-450)  10^3/uL


 


MPV   11.1 H   (7.9-10.8)  fL


 


Neut # (Auto)   8.0 H   (1.5-6.6)  10^3/uL


 


Lymph # (Auto)   0.6 L   (1.5-3.5)  10^3/uL


 


Mono # (Auto)   0.7   (0.0-1.0)  10^3/uL


 


Eos # (Auto)   0.0   (0.0-0.7)  10^3/uL


 


Baso # (Auto)   0.0   (0.0-0.1)  10^3/uL


 


Absolute Nucleated RBC   0.00   x10^3/uL


 


Nucleated RBC %   0.0   /100WBC


 


PT    20.4 H  (9.9-12.6)  secs


 


INR    1.9 H  (0.8-1.2)  


 


VBG pH     (7.31-7.41)  


 


Ionized Calcium     (1.15-1.33)  mmol/L


 


Potassium     (3.5-5.0)  mmol/L


 


POC Whole Bld Glucose     (70 - 100)  mg/dL


 


Phosphorus  2.9    (2.5-4.6)  mg/dL


 


Magnesium  1.7    (1.7-2.8)  mg/dL














  05/10/23 05/10/23 05/10/23 Range/Units





  22:45 22:45 22:45 


 


WBC     (4.8-10.8)  x10^3/uL


 


RBC     (4.20-5.40)  10^6/uL


 


Hgb     (12.0-16.0)  g/dL


 


Hct     (37.0-47.0)  %


 


MCV     (81.0-99.0)  fL


 


MCH     (27.0-31.0)  pg


 


MCHC     (32.0-36.0)  g/dL


 


RDW     (12.0-15.0)  %


 


Plt Count     (130-450)  10^3/uL


 


MPV     (7.9-10.8)  fL


 


Neut # (Auto)     (1.5-6.6)  10^3/uL


 


Lymph # (Auto)     (1.5-3.5)  10^3/uL


 


Mono # (Auto)     (0.0-1.0)  10^3/uL


 


Eos # (Auto)     (0.0-0.7)  10^3/uL


 


Baso # (Auto)     (0.0-0.1)  10^3/uL


 


Absolute Nucleated RBC     x10^3/uL


 


Nucleated RBC %     /100WBC


 


PT     (9.9-12.6)  secs


 


INR     (0.8-1.2)  


 


VBG pH   7.292 L   (7.31-7.41)  


 


Ionized Calcium   1.08 L   (1.15-1.33)  mmol/L


 


Potassium     (3.5-5.0)  mmol/L


 


POC Whole Bld Glucose     (70 - 100)  mg/dL


 


Phosphorus    3.3  (2.5-4.6)  mg/dL


 


Magnesium  1.6 L    (1.7-2.8)  mg/dL














  05/10/23 05/10/23 05/10/23 Range/Units





  20:39 17:35 16:14 


 


WBC     (4.8-10.8)  x10^3/uL


 


RBC     (4.20-5.40)  10^6/uL


 


Hgb     (12.0-16.0)  g/dL


 


Hct     (37.0-47.0)  %


 


MCV     (81.0-99.0)  fL


 


MCH     (27.0-31.0)  pg


 


MCHC     (32.0-36.0)  g/dL


 


RDW     (12.0-15.0)  %


 


Plt Count     (130-450)  10^3/uL


 


MPV     (7.9-10.8)  fL


 


Neut # (Auto)     (1.5-6.6)  10^3/uL


 


Lymph # (Auto)     (1.5-3.5)  10^3/uL


 


Mono # (Auto)     (0.0-1.0)  10^3/uL


 


Eos # (Auto)     (0.0-0.7)  10^3/uL


 


Baso # (Auto)     (0.0-0.1)  10^3/uL


 


Absolute Nucleated RBC     x10^3/uL


 


Nucleated RBC %     /100WBC


 


PT     (9.9-12.6)  secs


 


INR     (0.8-1.2)  


 


VBG pH    7.300 L  (7.31-7.41)  


 


Ionized Calcium    1.06 L  (1.15-1.33)  mmol/L


 


Potassium     (3.5-5.0)  mmol/L


 


POC Whole Bld Glucose  200 H  143 H   (70 - 100)  mg/dL


 


Phosphorus     (2.5-4.6)  mg/dL


 


Magnesium     (1.7-2.8)  mg/dL














  05/10/23 Range/Units





  16:14 


 


WBC   (4.8-10.8)  x10^3/uL


 


RBC   (4.20-5.40)  10^6/uL


 


Hgb   (12.0-16.0)  g/dL


 


Hct   (37.0-47.0)  %


 


MCV   (81.0-99.0)  fL


 


MCH   (27.0-31.0)  pg


 


MCHC   (32.0-36.0)  g/dL


 


RDW   (12.0-15.0)  %


 


Plt Count   (130-450)  10^3/uL


 


MPV   (7.9-10.8)  fL


 


Neut # (Auto)   (1.5-6.6)  10^3/uL


 


Lymph # (Auto)   (1.5-3.5)  10^3/uL


 


Mono # (Auto)   (0.0-1.0)  10^3/uL


 


Eos # (Auto)   (0.0-0.7)  10^3/uL


 


Baso # (Auto)   (0.0-0.1)  10^3/uL


 


Absolute Nucleated RBC   x10^3/uL


 


Nucleated RBC %   /100WBC


 


PT   (9.9-12.6)  secs


 


INR   (0.8-1.2)  


 


VBG pH   (7.31-7.41)  


 


Ionized Calcium   (1.15-1.33)  mmol/L


 


Potassium   (3.5-5.0)  mmol/L


 


POC Whole Bld Glucose   (70 - 100)  mg/dL


 


Phosphorus   (2.5-4.6)  mg/dL


 


Magnesium  1.7  (1.7-2.8)  mg/dL














ABX Reporting


Has patient been on IV antibiotics over the past 48 hours?: Yes





Assessment/Plan





- Problem List


(1) Hypotension


Impression: 





She was transferred to the ICU May 9 for her hypotension.  In this unfortunate 

female, hypotension could be from the disease process in her liver.  CT scan 

shows extensive, multiple, masses in the liver.  A left paracolic gutter nodule,

and lymphadenopathy.  She does not appear to have signs and symptoms of sepsis 

other than hypotension and no objective source on CT, CXR or UA.   There is no 

pulmonary, GI, or  findings other than pericholecystic inflammation on CT done

on admission.  She is tender in the right upper quadrant.  She is not having an 

MI.  She does not have a GI bleed but could be bleeding in her liver due to mets

and an elevated INR (but no tachycardia on vitals).  She is not intravascularly 

depleted with dehydration





Treatment consisted of Levophed to keep mean arterial pressure at 65. The repeat

abdomen pelvis CT done yesterday for the intractable right upper quadrant pain 

has no evidence of obstruction.  She continues to have left paracolic gutter 

lesion, a liver that is filled with innumerable masses.  She has nonspecific 

pericholecystic fluid and wall thickening.  The biliary tree and pancreatic duct

are prominent without definite pathologic dilation or obstruction.  Possible 

small splenic cyst.  Pathologic appearing lymph nodes.  Pelvis has a small 

amount of free fluid.  Elias is in place with an under distended bladder.  She 

had a hysterectomy.





Since infection could not be completely ruled out,  I started her on broad-

spectrum antibiotics with cefepime, vancomycin, and Flagyl.  Today is day #3.





She did have blood cultures on May 7.  Those were positive today with gram-

positive cocci.  PCR has staph species.  Since they became positive 3 days after

they were done, I suspect this is contamination. I also ordered blood cultures 

on May 9.  Those blood cultures have had no growth after 24 hours.





Levophed continues at 2 mcg.  Blood pressure is 136 systolic at times.  Urine 

output is maintained.





Plan:


Stop empiric antibiotics and monitor for signs and symptoms of infection


I Reduced Levophed to 1 mcg and see how she does.  I have again encouraged 

nursing to see if we can get her off the Levophed.


I have asked nursing to please get this patient out of bed and into a chair.  

And I have asked physical therapy to start working with her.





(2) Supratherapeutic INR


Impression: 


When the patient was admitted she was admitted with an INR of greater than 10.  

She takes medication for atrial fibrillation which includes Coumadin.  She was 

taking her medication in spite of not having any p.o. intake for 4 days.  

Between the liver being replaced by cancer, dehydration, and taking her 

Coumadin, she was supratherapeutic.  She received FFP on admission.  On the 

second day her INR was 4.7 and she received vitamin K.  the third day, her INR 

was 4.3 and she received more vitamin K.  We are attempting to get her down to 

less than 1.5.  She needs this for radiology for liver biopsy.





On the 4th day, INR was 1.9.  Radiology had cancelled her biopsy and has asked 

that I treat her again for the INR.  Family is understandably worried and upset.

 They really want this biopsy to see what the diagnosis is going to be to them 

let oncology know what treatment would be.





I ordered fresh frozen plasma.  But we ended up not using it.  Radiology 

reviewed the chart and felt that since the patient was on Levophed, 

"hemodynamically unstable", she was not a good candidate for liver biopsy due to

the risk of bleeding.  As such the biopsy was canceled.  Radiology was able to 

come and speak to the family and explained their thought process to the family.





(3) RUQ abdominal pain


Impression: 


This unfortunate female has already had imaging studies that tell us that she 

has multiple, multiple masses to her liver.  The presumptive thought is that she

has metastatic disease of unknown primary.  There is nodularity along the 

capsule as well as the parenchyma.  Gallbladder is unremarkable.  No h

ydronephrosis.  This was seen on the admission abdomen/pelvis CT May 7.  That 

was done without contrast.  Abdominal ultrasound also shows innumerable 

hypoechoic mass lesions.  A representative large complex mass in the posterior 

right hepatic lobe measures 6.2 cm.  The intrahepatic ducts appear nondilated.  

There is dilation of the extrahepatic duct with the common bile duct 1 cm (think

primary hepatobiliary w mets or a passed stone).  Her total bili was 1.7 on 

admission.  It is 1.1 today.  AST was 206 on admission and is 66 today.  ALT was

74 on admission and is now 45 today.  Alk phos was 311 and is 220 today.  

Ammonia level is 15.3.





5/9 The radiologist and I discussed the previous CT.  And our plans moving 

forward.  Our obvious suspicion is that she has cancer, either hepatobiliary of 

mets.  Or unknown primary with mets.  I am hoping for a pathologic piece of 

tissue to give us the diagnosis. 





I was worried that she may be bleeding in her liver causing such new intractable

pain.  After I reviewed her liver function studies, I also thought she could be 

having gallbladder symptoms even though she did not have stones on the admission

CT.  If her elevated liver function studies were from tumor, they should not be 

going down.  And they are going down. Radiologist is worried that they do not 

have a contrasted CAT scan to let him know if they will be getting into trouble 

with an angioma.





So I ordered a multiphase CT of the abdomen 5/9.  There is no hemangioma.  No 

bleeding under the capsule. The liver masses were much better delineated for the

radiologist.  We agreed that as long as I can get her INR below 2 she could go 

to biopsy on 5/10. 





Then I reviewed the radiology literature with 5/10 radiologist.  Their 

guidelines state that the INR needs to be below 1.5.  See problem #2.





Her pain is better with the morphine PCA pump.  I have ordered a 1 mg/h 

continuous infusion rate.  I am allowing her 1 mg every 10 minutes with a max of

8 mg in 4 hours and I ordered a Palliative Care consult. 





Plan:


Continue above PCA pump


Palliative care has seen the patient today.  She was seen in the late afternoon 

and they have not had time to dictate their note.  They predict that this 

patient will most likely need to be discharged on long-acting opioids.  They 

will review how much morphine she has had delivered over the last 2 days and 

calculate an appropriate conversion dose and let me know.





(4) Altered mental status resolved


Impression: 


 By May 9 she was alert enough to process the information I was telling her and 

be appropriately grief stricken and fearful about the cancer diagnosis.  She was

able to tell me what she was feeling, able to describe her pain well.  , 

who was at the bedside yesterday, endorsed that she was the best she has been 

since being in the hospital.





She continues to be fatigued, weak, but is oriented to person place and time.





Plan:


Most likely her altered mental status was from the dehydration and the pain of 

liver mets.  I have encouraged nursing to get the patient out of bed and in a 

chair.  She is lost a lot of ground.  Plan is for her to be discharged home and 

want her to be able to stand and pivot and walk 1 step





(5) Acute kidney injury resolved. 


Labs were all reviewed.  She normally runs a creatinine of 0.8.  With her 

history of 4 days of poor oral intake, she is dehydrated and this caused her 

BEN.  Her BUN/creatinine on admission were 41/2.3. Not only was her lisinopril 

stopped, she was started on IV fluids and >> 34/1.6>>18/0.9>>13/0.7 today. 


Plan:


Avoid nephrotoxins


Continue with IV fluids


Follow BMP daily





(6) Liver masses


Her CT scan then the ultrasound done  showed "innumerable" masses in the liver. 

There is one particularly large mass measuring 6.2 cm in the right posterior 

lobe.


The ED provider was the first 1 to tell the patient and  that she 

probably has cancer. We had planned to get tissue sample for pathology.  

Conversation was held with radiology on May 8 and we needed to normalize her 

INR.  On May 8 she received vitamin K and FFP.  In spite of that, her INR is 

still over 4 today.  Today's radiologist wanted better imaging with contrast and

a repeat CT was done.  Not only does she have the same liver masses, she appears

to have nodules in the paracolic gutter indicating carcinomatosis.  

Lymphadenopathy.  She is still not ready for biopsy because of INR.  I am also 

noting that she has changes of inflammation on her gallbladder.  Could you have 

passed a stone?  Could she have primary hepatobiliary cancer that has 

metastasized.





In speaking to radiology I repeated the CT of abdomen and used contrast 5/9,  

and the repeat CAT scan has been helpful so he does not have to worry about a 

hemangioma.





I also spoke to oncology in the medical ambulatory clinic 5/9.  I presented the 

case to the oncologist.  Her hypotension and possible sepsis is definitely 

concerning.  But they feel that if we can get her through this temporary phase 

of illness, and have a tissue diagnosis, there are therapies that could allow 

her a decent quality of life in an extended life of a few years.  They feel it 

is worth getting a liver biopsy and moving forward with at least getting a 

tissue diagnosis.





I also spoke to the patient about CODE STATUS on 5/9.  She really did understand

the possibility that she could have a terminal illness and she was 

understandably tearful and grief stricken.  This is all happening so fast.  I 

discussed what the resuscitative effort would mean with regards to CPR and 

intubation.  She states that she does not want that.  While she is willing to go

to do treatment, get a biopsy, and see how she does, she feels that a full on 

resuscitative measures with CPR and intubation would not be what she wants.  

"What would be the point" is her statement. So CODE STATUS was changed to DO NOT

RESUSCITATE.





Some of the tumor markers have already come back.  Alpha-fetoprotein is 1.8.  

Normal levels.  CA 15-3 is 623.  High.  CA 19-9 antigen is pending.  Ca1 25 

antigen is 14 and within normal range.  CEA is 1633.5. I started Decadron 

yesterday.  Some oncology literature suggest that pain is relieved by the anti-

inflammatory effects of the steroids.





Plan:


Tumor markers as already discussed above





She has a POLST form at the bedside that she would like me to fill out with her.

I was not able to do that yesterday. She is asked that her  and daughter 

Juhi be the points of contact for the POLST form





By May 15, if she is still here, and off of the Levophed, I would request a 

liver biopsy before discharge





(8) Transaminitis


The family was all in the room and reported that the patient gets yearly 

thorough blood tests.  These were done in October 2022 and the LFTs were normal 

then ( I reviewed all labs in this EMR).


I questioned her about alcohol use and she hardly uses any.  The  and 

other family members concur. In following her liver function studies daily, they

are improving.  If she truly had LFTs that were elevated because of tumor, the 

LFTs would not be getting better.  Since they are getting better it makes me 

wonder if she has a transitory process such as cholecystitis or a passed stone 

giving her the right upper quadrant pain that so severe today. I reiterated to 

them that her liver function studies could be normal in spite of having tumor in

the liver.  I really do not think that something was missed in October on the 

basis of labs.


Plan:


Follow LFTs daily


Avoid hepatotoxins


Work-up of the liver mets as described in #6 above








(8) DM II


The patient takes metformin.  The  admitted she does not really follow a 

diabetic diet. Our treatment consist of ACHS fingerstick checks and sliding 

scale insulin coverage for this. A1c is 7.1%.  


5/8 her glucose was 96, 124, 154, 131.


5/9 her glucose was 108, 139. , 112, 139


May 10: 132, 143, 200


Today 163, 240 (I did start her on Decadron yesterday)


 She is eating 0 to 75% of her food. She says that she appreciates her diet 

being changed to regular diet on May 9.  She really did not like the dysphagia 

pured diet.


Plan:


Continue with ACHS fingerstick checks and sliding scale insulin coverage. 

Advance coverage tomorrow if her glucose is consistently above 200





(9) Chronic pain


I asked the patient and  why she is on methadone.  There is a history of 

previously needing to be on oxycodone at very high doses which was then 

transitioned to methadone.  Her chronic pain was in her neck and lower back. 


5/9 her right upper quadrant pain is so severe that she is requiring doses of 

morphine IV that are not holding her.  It is not her diffuse pain that is 

bothering her.


I stopped the methadone and started her on a PCA pump. Her pain is better 

controlled with the PCA pump.


5/11:  Part of the reason I requested a palliative care consult was discharge 

transitioning this patient to oral meds to control her pain.  Consult will be 

dictated later today and she will be making recommendations.





Plan:


PCA pump as noted above





(10) Hypothyroidism


Her TSH is elevated but free T4 is normal.


Plan:


Continue with her same home thyroid replacement dose





(11) Hx of CVA


Patient has had 3 strokes, 2003, 2013 and 2014.  The chart indicates she has a 

"intracardiac shunt". She has chronic weakness on one side.  She uses walk


She is on chronic warfarin since those strokes


Plan:


Eventually she will need PT and OT evaluations.





(12)  BOBBI





Plan: I have reiterated to her family that her home machine needs to be brought 

in here.  Especially in view of the fact of the morphine use.

## 2023-05-11 NOTE — PROVIDER PROGRESS NOTE
Nocturnist Note





- Nocturnist Note


Nocturnist Note: 





RN paged to report:


"Pt has PCA with basal rate of 1 mg/hr. Has become somnolent with RR of 8 after 

giving herself 3 mg over about 2 hours. Would like order to stop the basal rate,

please."





as RN requested, changed basal rate to 0


monitor patient pain and proceed with further adjustment as need











Janet López DO


Internal Medicine


[employer]

## 2023-05-11 NOTE — CONSULTATION NOTE
Palliative Care Consultation





- Referral


Referring Provider: Dr. Rebecca Collins


Time of Visit: 7920-5831


Referral setting: Hospitalized patient


Referral Reason: Pain of neoplastic origin/Met cancer of unknown origin/goals of

care





- Information Sources


Records reviewed: RN notes reviewed, Previous records reviewed


History/Review of Systems obtained from: Patient, Family ( Jimenez)


Exam limitations: Other (patient with poor recall of last few days; engaged and 

appropriate during meeting; [facility])





- History of Present Illness


Brief History of Present Illness: 


This is a 73-year-old woman who was admitted acutely on 5/7/2023 for significant

changes in mental status, worsening abdominal pain, and weakness.  Patient had 

been feeling poorly for about a week week and a half, and had been reluctant to 

seek assistance.  When patient was acutely ill,  brought her into the ED.

In hindsight, patient has had ongoing fluctuating episodes where she is "not 

felt well" and these have included nausea, and periods of significant fatigue.  

She though was quite functional up to 2 weeks ago, able to ambulate short distan

evans in her house, her cane only for balance secondary to neuropathy, able to do 

some household tasks and manage her ADLs.She has had some weight loss, but 

perceives this as intentional, and has multiple underlying chronic illnesses, 

managed by her primary care provider.





Patient presented with acute and worsening right upper quadrant pain, she did 

have a CT scan that shows she has multiple masses to her liver, with largest 

mass identified in the posterior right hepatic lobe measuring 6.2 cm.  She did 

have elevated liver enzymes on admit, these are improving slowly.  Unfortunately

they have been waiting for a liver biopsy, with the hopes to identify underlying

cancer diagnosis that will drive patient's options moving into the future.





She was admitted to ICU, with worsening hypotension, they are weaning her off 

her Levophed, as well as she is on a morphine PCA.  She was admitted with a alves

pratherapeutic INR, have continued to move forward in trying to get this below 

1.5 to be able to get the liver biopsy as well as the hypotension under control.

 Her encephalopathy has improved, as well as her BEN, she is of note also has 

had 3 strokes, with chronic weakness on left side, for which she uses a cane 

both for weakness and peripheral neuropathy.





Palliative care meeting with patient and  for introduction of palliative 

care services as well as evaluating and recommendations for pain management.  

Dr. Collins, had met with patient and  regarding initial conversation for 

advance care planning. At this point identified goals are to have the liver 

biopsy, weigh benefits and burdens of treatment options versus quality of life 

and quantity of life in the context of their current understanding of patient 

with stage IV disease.








Medical/Surgical History





- Past Medical History


Cardiovascular: reports: Hypertension


Respiratory: reports: None, CPAP use


Neuro: CVA, Peripheral neuropathy


Endocrine/Autoimmune: reports: Type 2 diabetes, HyPOthyroidism


GI: reports: Chronic constipation (often goes every 2-3 days; up to a week at 

times is norm)


: reports: None


Psych: reports: None


Musculoskeletal: reports: Osteoarthritis, Chronic back pain


Derm: reports: None


MRSA Hx?: No





- Past Surgical History


General: reports: Other


Ortho: reports: Other


/GYN: reports: Hysterectomy, Other





Social History





- Living Situation


Living arrangement: At home


Living Situation: With spouse/s.o.


Support System: 


[place] and her family, there is

2 houses as well as an apartment on the property, she and her  live in 

plan, her daughter and her family live in the other, and their grandson lives in

the apartment.  They do have 4 children total, and 10 grandkids.  They do have a

large and supportive family.  The daughters have been here taking turns to sleep

and provide support so father can go home.  Her sister has arrived as well, they

are all appropriately concerned.


Patient after her strokes, has never done rehab, she reports she does remember 

some about a visiting nurse/got exercises at 1 point but only a few visits in 

the past.  They do have some equipment such as a wheelchair, commode, and shower

bench.  She does have a bed, sleep number, that can raise up.  She is very much 

looking forward to being in it again.








Medications/Allergies





- Medications


Active Medication List: 





Active Medications





Aspirin (Aspirin Ec 325 Mg Tablet)  325 mg PO DAILY PRN


   PRN Reason: PAIN 1-4


Calcium Citrate (Calcium Citrate 250 Mg Tablet)  500 mg PO DAILY [facility]


   Last Admin: 05/11/23 08:32 Dose:  500 mg


   


Cholecalciferol (Cholecalciferol 25 Mcg Tablet)  25 mcg PO DAILY [facility]


   Last Admin: 05/11/23 08:32 Dose:  25 mcg


   


Dexamethasone (Dexamethasone 4 Mg Tablet)  2 mg PO BIDWM [facility]


   Last Admin: 05/11/23 08:32 Dose:  2 mg


   


Docusate Sodium (Docusate Sodium 250 Mg Capsule)  250 - 500 mg PO DAILY [facility]


   Last Admin: 05/11/23 12:00 Dose:  250 mg


   


Famotidine (Famotidine 20 Mg Tablet)  20 mg PO BID [facility]


   Last Admin: 05/11/23 08:33 Dose:  20 mg


   


Sodium Chloride (Normal Saline 0.9%)  1,000 mls @ 125 mls/hr IV .Q8H [facility]


   Last Admin: 05/11/23 14:32 Dose:  125 mls/hr


   


Norepinephrine Bitartrate (Levophed 8 Mg/250 Ml D5w)  8 mg in 250 mls @ 15 

mls/hr IV .[id number] [facility]; Protocol


   Last Admin: 05/11/23 15:54 Dose:  1 mcg/min, 1.875 mls/hr


   


Cefepime HCl 2 gm/ Sodium (Chloride)  100 mls @ 200 mls/hr IV TID [facility]


   Last Infusion: 05/11/23 15:31 Dose:  Infused


   


Metronidazole (Flagyl 500 Mg/100 Ml)  500 mg in 100 mls @ 100 mls/hr IV TID [facility]


   Last Infusion: 05/11/23 15:55 Dose:  Infused


   


Vancomycin HCl 1.5 gm/ Sodium (Chloride)  500 mls @ 167 mls/hr IV Q24H [facility]


   Last Infusion: 05/10/23 22:20 Dose:  Infused


   


Sodium Chloride (Normal Saline 0.9%)  500 mls @ 20 mls/hr IV Q24H PRN


   PRN Reason: TKO RATE


   Last Admin: 05/11/23 06:30 Dose:  20 mls/hr


   


Ibuprofen (Ibuprofen 400 Mg Tablet)  400 mg PO Q4HR PRN


   PRN Reason: Pain 1 to 4


   Last Admin: 05/09/23 20:26 Dose:  400 mg


   


Insulin Human Lispro (Insulin Lispro 300 Unit/3 Ml Pen)  1 - 9 unit SUBQ 

0800,1200,1700,2100 [facility]; Protocol


   Last Admin: 05/11/23 12:00 Dose:  5 unit


   


Levothyroxine Sodium (Levothyroxine 75 Mcg Tablet)  150 mcg PO QDAC [facility]


   Last Admin: 05/11/23 05:47 Dose:  150 mcg


   


Midodrine (Midodrine 2.5 Mg Tablet)  5 mg PO TID [facility]


   Last Admin: 05/11/23 14:28 Dose:  5 mg


   


Mineral Oil (Min Oil/Dimethicon/Coconut Oil 92 Gm Tube)  1 applic TOP PRN PRN


   PRN Reason: Skin Care


   Last Admin: 05/10/23 21:34 Dose:  1 applic


   


Morphine Sulfate/Sodium Chloride (Morphine Pca 50 Mg)  50 mg IV PCA PRN; Sheela

col


   PRN Reason: Severe Pain (Level 7-10)


   Last Admin: 05/10/23 17:21 Dose:  50 mg


   


Multivitamins/Minerals (Multivitamin W/Minerals Tablet)  1 tab PO DAILYWM [facility]


   Last Admin: 05/11/23 08:33 Dose:  1 tab


   


Naloxone HCl (Naloxone 0.4 Mg/Ml Vial)  0.4 mg IVP PRN PRN


   PRN Reason: respiration <10


Nystatin (Nystatin Powder 15 Gm)  1 applic TOP BID [facility]


   Last Admin: 05/11/23 08:33 Dose:  1 applic


   


Ondansetron HCl (Ondansetron 4 Mg/2 Ml Vial)  4 mg IVP Q4HR PRN


   PRN Reason: Nausea / Vomiting


   Last Admin: 05/08/23 11:06 Dose:  4 mg


   


Polyethylene Glycol (Polyethylene Glycol 3350 17 Gm Packet)  17 gm PO DAILY [facility]


   Last Admin: 05/11/23 08:33 Dose:  17 gm


   


Pregabalin (Pregabalin 100 Mg Capsule)  200 mg PO BID [facility]


   Last Admin: 05/11/23 08:33 Dose:  200 mg


   


Sodium Chloride (Sodium Chloride Flush 0.9% 10 Ml Syringe)  10 ml IVP PRN PRN


   PRN Reason: AS NEEDED PER PROVIDER ORDERS


   Last Admin: 05/08/23 18:23 Dose:  10 ml


   


Sodium Chloride (Sodium Chloride Flush 0.9% 10 Ml Syringe)  10 ml IVP 0100,0900

,1700 [facility]


   Last Admin: 05/11/23 08:33 Dose:  10 ml


   


Sodium Chloride (Sodium Chloride Flush 0.9% 10 Ml Syringe)  20 ml IVP PRN PRN


   PRN Reason: After Blood Draw


   Last Admin: 05/11/23 04:56 Dose:  20 ml


   


Tizanidine HCl (Tizanidine 4 Mg Tablet)  8 mg PO QPM [facility]


   Last Admin: 05/10/23 20:38 Dose:  8 mg


   





                                        





Atorvastatin Calcium 40 mg PO DAILY 02/12/21 


Furosemide [Lasix] 20 mg PO DAILY 02/12/21 


Levothyroxine Sodium [Levothyroxine] 150 mcg PO DAILY 02/12/21 


Methadone [Methadone Hcl] 5 mg PO BID 02/12/21 


Pregabalin [Lyrica] 150 mg PO BID 02/12/21 


Warfarin [Coumadin] 5 mg PO DAILY 02/12/21 


amLODIPine [Norvasc] 5 mg PO DAILY 02/12/21 


tiZANidine [Zanaflex] 8 mg PO QPM 02/12/21 


Metformin HCl [Metformin ER Osmotic] 500 mg PO BID 11/23/21 


Aspirin [Aspirin EC] 1 tab PO DAILY PRN 05/08/23 


Calcium Citrate/Vitamin D3 [Calcium Cit 315-Vit D3 250 Cpt] 2 tab PO DAILY 

05/08/23 


Cholecalciferol [Vitamin D3] 1 tab PO DAILY 05/08/23 


Famotidine [Pepcid] 1 tab PO HS PRN 05/08/23 


Lisinopril [Zestril] 1 tab PO DAILY 05/08/23 


Multivitamin with Minerals [Multivitamins with Minerals] 1 tab PO DAILY 05/08/23




Pregabalin 1 cap PO BID 05/08/23 











- Allergies


Allergies/Adverse Reactions: 


                                    Allergies











Allergy/AdvReac Type Severity Reaction Status Date / Time


 


No Known Drug Allergies Allergy   Verified 10/17/22 14:56














Review of Systems





- Constitutional


Constitutional: reports: Fatigue, Weakness, Poor appetite (has had early satiety

for a long period of time), Weight loss (20 pounds)





- Ears, Nose & Throat


Ears, Nose & Throat: reports: Hearing loss, Dry mouth





- Cardiovascular


Cardiovascular: reports: Exertional dyspnea





- Respiratory


Respiratory: reports: SOB with exertion





- Gastrointestinal


Gastrointestinal: reports: Abdominal pain, Constipation, Bloating, Poor 

appetite, Early satiety





- Musculoskeletal


Musculoskeletal: reports: Back pain, Limited range of motion, Muscle weakness, 

Assistive devices (uses cane at home), Transfer issues





- Integumentary


Integumentary: reports: Dryness





- Neurological


Neurological: reports: General weakness, Numbness, Pre-existing deficit, 

Abnormal gait





- Psychiatric


Psychiatric: reports: Anxiety





- Endocrine


Endocrine: reports: Diabetes type 2, Hypothyroidism





- Hematologic/Lymphatic


Hematologic/Lymph: reports: Anemia





- All Other Systems


All Other Systems: reports: Other (limited ROS;)





Physical Exam





- Vital Signs


Vital Signs: 





                                Vital Signs x48h











  Temp Pulse Resp BP Pulse Ox O2 Flow Rate


 


 05/11/23 16:01  36.5 C  50 L  8 L  122/81 H  98  4


 


 05/11/23 16:00    13   


 


 05/11/23 15:00  36.4 C L  48 L  13  136/76 H  98  4


 


 05/11/23 14:09   52 L   121/62  


 


 05/11/23 14:00  36.4 C L  52 L  12  147/131 H  92  4


 


 05/11/23 13:00  36.4 C L  50 L  10 L  137/73 H  99  4


 


 05/11/23 12:00   44 L  12  139/95 H  98  4


 


 05/11/23 11:00  36.2 C L  43 L  8 L  143/72 H  99  4


 


 05/11/23 10:07    15   


 


 05/11/23 10:00  36.0 C L  47 L  10 L  126/68  100  4


 


 05/11/23 09:30    12   


 


 05/11/23 09:00  36.0 C L  50 L  12  129/71  100  4


 


 05/11/23 08:18    10 L   














- Physical Exam


General Appearance: positive: Mild distress (with pain)


Eyes Bilateral: positive: Normal inspection


ENT: positive: Dry mucous membranes


Neck: positive: Trachea midline


Cardiovascular: positive: Regular rate & rhythm


Respiratory: positive: No respiratory distress


Abdomen: positive: Tenderness (right upper quadrant), Guarding, Distended, Obese


Skin: positive: Pallor, Dryness


Extremities: positive: No pedal edema


Neurologic/Psychiatric: positive: Oriented x3, Depressed mood/affect, Flat 

affect





Palliative Care





- POLST


Patient has POLST: Yes


POLST Status: DNR, Selective Treatment


Pain: Pain worsening, Location (right upper quadrant; peripheral neuropathy; 

lower back pain), Comment (see discussion below)


Performance Status: 


Patient reports was able to transfer the chair today, family is hoping patient 

will be somewhat more independent.  Counseling provided regarding 2 options of 

rehab on discharge and home health support/rehab.  They are much more interested

both physically and emotionally and having her return home.








- Palliative Care


Discussion: 


Patient does have a POLST , completed with Dr. Collins hospitalist. Has a DNAR 

and selective treatments.  They are still somewhat in "the twilight zone" and 

trying to identify what direction to go in the future.  Her priority and comfort

is in her family, and to return home.  Risk her  reiterates this, does 

feel like they have enough support to be able to do this transition, but is 

quite interested in resources that might be available.  Did introduce [place] PES, as

well as paid caregiving support, will have MSW follow-up with patient and 

 if need to get application going








- Other Findings/Comments


Additional Discussion: 


Discussed regarding patient's pain history.  Patient has had severe disc disease

in neck and lower back, was seen at [facility], on large doses of 

OxyContin, perceives herself as "addicted".  Had been on escalating doses, this 

was in around 2014 as best she remembers, the physician lost his license.  

Patient had essentially titrated herself off, they did transition her to 

methadone, and has been chronically on 5 mg twice daily along with the 

pregabalin for her peripheral neuropathy, and the tizanidine 4 leg cramps and 

pain at night, which has been effective.Patient has a high tolerance to pain as 

well as a high tolerance to opioids, this will proved to be somewhat challenging

in the context of managing malignant pain.  Looking into the future, patient 

most likely should be transition to 1 opioid, would recommend transitioning to 

time-released morphine, inability to better respond to both titrating up and 

tapering down and real-time.  Patient is currently on a PCA pump, reports she 

does get relief with bolusing of 1 mg, it appears she is on a continuous 1 mg/h 

dose as well.  Counseling provided regarding pain management in the setting of 

malignant pain, suspect patient's pain is most likely related to liver capsule 

stretching, and/or pancreatic in origin in the setting the high CA9-19. It is 

its mostly located .in her right upper quadrant. Had been started on 

dexamethasone 2 mg twice daily with some improvement, however but still 

identifying pain as a significant issue.Of course with increasing her opioid 

use, patient is at high risk for worsening constipation, patient already has 

slow and infrequent bowel movements.  She will need to be put on a fairly 

aggressive regimen particularly given her underlying disease process.








Results





- Lab Results


Lab results reviewed: Yes


Fish Bones: 


                                 05/11/23 04:36





                                 05/11/23 12:18


Lab and Imaging Results: 





                               Lab Results x24hrs











  05/11/23 05/11/23 05/11/23 Range/Units





  12:18 12:18 11:41 


 


WBC     (4.8-10.8)  x10^3/uL


 


RBC     (4.20-5.40)  10^6/uL


 


Hgb     (12.0-16.0)  g/dL


 


Hct     (37.0-47.0)  %


 


MCV     (81.0-99.0)  fL


 


MCH     (27.0-31.0)  pg


 


MCHC     (32.0-36.0)  g/dL


 


RDW     (12.0-15.0)  %


 


Plt Count     (130-450)  10^3/uL


 


MPV     (7.9-10.8)  fL


 


Neut # (Auto)     (1.5-6.6)  10^3/uL


 


Lymph # (Auto)     (1.5-3.5)  10^3/uL


 


Mono # (Auto)     (0.0-1.0)  10^3/uL


 


Eos # (Auto)     (0.0-0.7)  10^3/uL


 


Baso # (Auto)     (0.0-0.1)  10^3/uL


 


Absolute Nucleated RBC     x10^3/uL


 


Nucleated RBC %     /100WBC


 


PT     (9.9-12.6)  secs


 


INR     (0.8-1.2)  


 


VBG pH  7.307 L    (7.31-7.41)  


 


Ionized Calcium  1.11 L    (1.15-1.33)  mmol/L


 


Potassium   3.5   (3.5-5.0)  mmol/L


 


POC Whole Bld Glucose    240 H  (70 - 100)  mg/dL


 


Phosphorus     (2.5-4.6)  mg/dL


 


Magnesium   2.0   (1.7-2.8)  mg/dL














  05/11/23 05/11/23 05/11/23 Range/Units





  08:00 04:36 04:36 


 


WBC     (4.8-10.8)  x10^3/uL


 


RBC     (4.20-5.40)  10^6/uL


 


Hgb     (12.0-16.0)  g/dL


 


Hct     (37.0-47.0)  %


 


MCV     (81.0-99.0)  fL


 


MCH     (27.0-31.0)  pg


 


MCHC     (32.0-36.0)  g/dL


 


RDW     (12.0-15.0)  %


 


Plt Count     (130-450)  10^3/uL


 


MPV     (7.9-10.8)  fL


 


Neut # (Auto)     (1.5-6.6)  10^3/uL


 


Lymph # (Auto)     (1.5-3.5)  10^3/uL


 


Mono # (Auto)     (0.0-1.0)  10^3/uL


 


Eos # (Auto)     (0.0-0.7)  10^3/uL


 


Baso # (Auto)     (0.0-0.1)  10^3/uL


 


Absolute Nucleated RBC     x10^3/uL


 


Nucleated RBC %     /100WBC


 


PT     (9.9-12.6)  secs


 


INR     (0.8-1.2)  


 


VBG pH    7.310  (7.31-7.41)  


 


Ionized Calcium    1.07 L  (1.15-1.33)  mmol/L


 


Potassium   3.2 L   (3.5-5.0)  mmol/L


 


POC Whole Bld Glucose  163 H    (70 - 100)  mg/dL


 


Phosphorus     (2.5-4.6)  mg/dL


 


Magnesium     (1.7-2.8)  mg/dL














  05/11/23 05/11/23 05/11/23 Range/Units





  04:36 04:36 04:36 


 


WBC   9.3   (4.8-10.8)  x10^3/uL


 


RBC   3.43 L   (4.20-5.40)  10^6/uL


 


Hgb   10.2 L   (12.0-16.0)  g/dL


 


Hct   33.6 L   (37.0-47.0)  %


 


MCV   98.0   (81.0-99.0)  fL


 


MCH   29.7   (27.0-31.0)  pg


 


MCHC   30.4 L   (32.0-36.0)  g/dL


 


RDW   15.1 H   (12.0-15.0)  %


 


Plt Count   233   (130-450)  10^3/uL


 


MPV   11.1 H   (7.9-10.8)  fL


 


Neut # (Auto)   8.0 H   (1.5-6.6)  10^3/uL


 


Lymph # (Auto)   0.6 L   (1.5-3.5)  10^3/uL


 


Mono # (Auto)   0.7   (0.0-1.0)  10^3/uL


 


Eos # (Auto)   0.0   (0.0-0.7)  10^3/uL


 


Baso # (Auto)   0.0   (0.0-0.1)  10^3/uL


 


Absolute Nucleated RBC   0.00   x10^3/uL


 


Nucleated RBC %   0.0   /100WBC


 


PT    20.4 H  (9.9-12.6)  secs


 


INR    1.9 H  (0.8-1.2)  


 


VBG pH     (7.31-7.41)  


 


Ionized Calcium     (1.15-1.33)  mmol/L


 


Potassium     (3.5-5.0)  mmol/L


 


POC Whole Bld Glucose     (70 - 100)  mg/dL


 


Phosphorus  2.9    (2.5-4.6)  mg/dL


 


Magnesium  1.7    (1.7-2.8)  mg/dL














  05/10/23 05/10/23 05/10/23 Range/Units





  22:45 22:45 22:45 


 


WBC     (4.8-10.8)  x10^3/uL


 


RBC     (4.20-5.40)  10^6/uL


 


Hgb     (12.0-16.0)  g/dL


 


Hct     (37.0-47.0)  %


 


MCV     (81.0-99.0)  fL


 


MCH     (27.0-31.0)  pg


 


MCHC     (32.0-36.0)  g/dL


 


RDW     (12.0-15.0)  %


 


Plt Count     (130-450)  10^3/uL


 


MPV     (7.9-10.8)  fL


 


Neut # (Auto)     (1.5-6.6)  10^3/uL


 


Lymph # (Auto)     (1.5-3.5)  10^3/uL


 


Mono # (Auto)     (0.0-1.0)  10^3/uL


 


Eos # (Auto)     (0.0-0.7)  10^3/uL


 


Baso # (Auto)     (0.0-0.1)  10^3/uL


 


Absolute Nucleated RBC     x10^3/uL


 


Nucleated RBC %     /100WBC


 


PT     (9.9-12.6)  secs


 


INR     (0.8-1.2)  


 


VBG pH   7.292 L   (7.31-7.41)  


 


Ionized Calcium   1.08 L   (1.15-1.33)  mmol/L


 


Potassium     (3.5-5.0)  mmol/L


 


POC Whole Bld Glucose     (70 - 100)  mg/dL


 


Phosphorus    3.3  (2.5-4.6)  mg/dL


 


Magnesium  1.6 L    (1.7-2.8)  mg/dL














  05/10/23 05/10/23 05/10/23 Range/Units





  20:39 17:35 16:14 


 


WBC     (4.8-10.8)  x10^3/uL


 


RBC     (4.20-5.40)  10^6/uL


 


Hgb     (12.0-16.0)  g/dL


 


Hct     (37.0-47.0)  %


 


MCV     (81.0-99.0)  fL


 


MCH     (27.0-31.0)  pg


 


MCHC     (32.0-36.0)  g/dL


 


RDW     (12.0-15.0)  %


 


Plt Count     (130-450)  10^3/uL


 


MPV     (7.9-10.8)  fL


 


Neut # (Auto)     (1.5-6.6)  10^3/uL


 


Lymph # (Auto)     (1.5-3.5)  10^3/uL


 


Mono # (Auto)     (0.0-1.0)  10^3/uL


 


Eos # (Auto)     (0.0-0.7)  10^3/uL


 


Baso # (Auto)     (0.0-0.1)  10^3/uL


 


Absolute Nucleated RBC     x10^3/uL


 


Nucleated RBC %     /100WBC


 


PT     (9.9-12.6)  secs


 


INR     (0.8-1.2)  


 


VBG pH    7.300 L  (7.31-7.41)  


 


Ionized Calcium    1.06 L  (1.15-1.33)  mmol/L


 


Potassium     (3.5-5.0)  mmol/L


 


POC Whole Bld Glucose  200 H  143 H   (70 - 100)  mg/dL


 


Phosphorus     (2.5-4.6)  mg/dL


 


Magnesium     (1.7-2.8)  mg/dL














  05/10/23 Range/Units





  16:14 


 


WBC   (4.8-10.8)  x10^3/uL


 


RBC   (4.20-5.40)  10^6/uL


 


Hgb   (12.0-16.0)  g/dL


 


Hct   (37.0-47.0)  %


 


MCV   (81.0-99.0)  fL


 


MCH   (27.0-31.0)  pg


 


MCHC   (32.0-36.0)  g/dL


 


RDW   (12.0-15.0)  %


 


Plt Count   (130-450)  10^3/uL


 


MPV   (7.9-10.8)  fL


 


Neut # (Auto)   (1.5-6.6)  10^3/uL


 


Lymph # (Auto)   (1.5-3.5)  10^3/uL


 


Mono # (Auto)   (0.0-1.0)  10^3/uL


 


Eos # (Auto)   (0.0-0.7)  10^3/uL


 


Baso # (Auto)   (0.0-0.1)  10^3/uL


 


Absolute Nucleated RBC   x10^3/uL


 


Nucleated RBC %   /100WBC


 


PT   (9.9-12.6)  secs


 


INR   (0.8-1.2)  


 


VBG pH   (7.31-7.41)  


 


Ionized Calcium   (1.15-1.33)  mmol/L


 


Potassium   (3.5-5.0)  mmol/L


 


POC Whole Bld Glucose   (70 - 100)  mg/dL


 


Phosphorus   (2.5-4.6)  mg/dL


 


Magnesium  1.7  (1.7-2.8)  mg/dL














Impression and Recommendations





- Palliative Care


Impression: 


This is a 73-year-old woman who presents unexpectedly with cancer of unknown 

origin with metastatic disease to the liver, awaiting liver biopsy.  Patient 

with acute issues, including BEN, hypotension, encephalopathy, and functional 

decline are all improving.  Patient presents with pain of neoplastic origin and 

the complexity and setting of chronic pain, most likely given  suspect 

pancreatic origin, but does have liver mets with most of her pain right upper 

quadrant.  Palliative care is seeing patient and introduction for palliative 

care support, pain management, anticipatory guidance, and goals of care.





Recommendations/Counseling Done: 


1)Pain of neoplastic origin.  When feels appropriate, would recommend 

transitioning to the basal rate equal analgesic of her continuous infusion, 

which is 24 mg in 24 hours, this equals close to 70-75 morphine oral.  Would be 

appropriate to start her out at [facility] Contin 30 mg twice daily, and continue with 

the PCA as needed, to be able to then respond to her acute and fluctuating pain,

for 24 to 48 hours, then transition to oral for breakthrough, may need to 

increase long-acting at that time as well.Patient does have a history of long-

term chronic pain, high pain tolerance, but also tolerant of opioid medications.

 May need higher doses, but also in the setting of possible pancreatic cancer, 

and liver pain.Would continue her pregabalin for her peripheral neuropathy, as 

well as her tizanidine at bedtime, these have been long-term chronic pain 

medications with no need at this time to discontinue.Counseling provided 

regarding malignant pain versus chronic pain with patient and , and goal 

would be of treatment, palliative in nature with hope to decrease pain related 

to her cancer.





2.  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  Patient 

awaiting liver biopsy for further definition, though does have suspicious 

of [id number], most likely indicating pancreatic in origin. Counseling provided 

regarding local oncologist, oncology team and how they work, goals of treatment 

in the context of stage IV disease, palliative management with management of 

disease versus curative.  Counseling also provided regarding weighing benefits 

and burdens of treatment in the context of her current healthcare status, and 

setting of priorities.Introduced the role of palliative care and providing layer

of support alongside oncology, particularly in the role of pain management and 

focus on quality of life issues and anticipatory guidance.





3.  Goals of care.  Patient's understanding is she does have cancer, and it is 

serious and most likely terminal in nature.  Both she and her  are 

interested in what might be options for prolonging quantity of life as well as 

interested in quality of life measures.  Counseling provided regarding options 

and transitioning home, rehab versus home health support.  Given the uncertainty

of her future, family is a huge priority, they do have good support and she does

want to return home to her home setting.





90 minutes with review of records, coordination of care with in patient 

hospitalist, Family meeting with patient and  and myself, counseling 

regarding pain and symptom management, goals of care, disease trajectory and 

management of stage IV cancer, and anticipatory guidance for transition 

planning.We will plan to see in follow-up tomorrow, for further recommendations 

regarding pain management.

## 2023-05-12 LAB
ANION GAP SERPL CALCULATED.4IONS-SCNC: 10 MMOL/L (ref 6–13)
BASOPHILS NFR BLD AUTO: 0 10^3/UL (ref 0–0.1)
BASOPHILS NFR BLD AUTO: 0.1 %
BUN SERPL-MCNC: 12 MG/DL (ref 6–20)
CA-I SERPL ISE-MCNC: 1.08 MMOL/L (ref 1.15–1.33)
CALCIUM UR-MCNC: 7.5 MG/DL (ref 8.5–10.3)
CHLORIDE SERPL-SCNC: 102 MMOL/L (ref 101–111)
CO2 SERPL-SCNC: 28 MMOL/L (ref 21–32)
CREAT SERPLBLD-SCNC: 0.6 MG/DL (ref 0.4–1)
EOSINOPHIL # BLD AUTO: 0 10^3/UL (ref 0–0.7)
EOSINOPHIL NFR BLD AUTO: 0 %
ERYTHROCYTE [DISTWIDTH] IN BLOOD BY AUTOMATED COUNT: 15.2 % (ref 12–15)
GFRSERPLBLD MDRD-ARVRAT: 99 ML/MIN/{1.73_M2} (ref 89–?)
GLUCOSE SERPL-MCNC: 157 MG/DL (ref 70–100)
HCT VFR BLD AUTO: 31.8 % (ref 37–47)
HGB UR QL STRIP: 9.7 G/DL (ref 12–16)
INR PPP: 1.7 (ref 0.8–1.2)
LYMPHOCYTES # SPEC AUTO: 0.8 10^3/UL (ref 1.5–3.5)
LYMPHOCYTES NFR BLD AUTO: 7.5 %
MAGNESIUM SERPL-MCNC: 2.1 MG/DL (ref 1.7–2.8)
MCH RBC QN AUTO: 29.8 PG (ref 27–31)
MCHC RBC AUTO-ENTMCNC: 30.5 G/DL (ref 32–36)
MCV RBC AUTO: 97.5 FL (ref 81–99)
MONOCYTES # BLD AUTO: 0.9 10^3/UL (ref 0–1)
MONOCYTES NFR BLD AUTO: 8.7 %
NEUTROPHILS # BLD AUTO: 8.3 10^3/UL (ref 1.5–6.6)
NEUTROPHILS # SNV AUTO: 10 X10^3/UL (ref 4.8–10.8)
NEUTROPHILS NFR BLD AUTO: 83 %
NRBC # BLD AUTO: 0 /100WBC
NRBC # BLD AUTO: 0 X10^3/UL
PDW BLD AUTO: 11.5 FL (ref 7.9–10.8)
PH BLDV: 7.37 [PH] (ref 7.31–7.41)
PHOSPHATE BLD-MCNC: 2.2 MG/DL (ref 2.5–4.6)
PLATELET # BLD: 192 10^3/UL (ref 130–450)
POTASSIUM SERPL-SCNC: 4 MMOL/L (ref 3.5–5)
PROTHROM ACT/NOR PPP: 18.8 SECS (ref 9.9–12.6)
RBC MAR: 3.26 10^6/UL (ref 4.2–5.4)
SODIUM SERPLBLD-SCNC: 140 MMOL/L (ref 135–145)

## 2023-05-12 RX ADMIN — CALCIUM CARBONATE (ANTACID) CHEW TAB 500 MG SCH MG: 500 CHEW TAB at 11:38

## 2023-05-12 RX ADMIN — LEVOTHYROXINE SODIUM SCH MCG: 75 TABLET ORAL at 06:44

## 2023-05-12 RX ADMIN — MORPHINE SULFATE PRN MG: 15 TABLET ORAL at 13:47

## 2023-05-12 RX ADMIN — DIMETHICONE PRN APPLIC: 13000 CREAM TOPICAL at 22:19

## 2023-05-12 RX ADMIN — PREGABALIN SCH MG: 100 CAPSULE ORAL at 08:29

## 2023-05-12 RX ADMIN — MORPHINE SULFATE SCH MG: 30 TABLET, FILM COATED, EXTENDED RELEASE ORAL at 20:52

## 2023-05-12 RX ADMIN — MIDODRINE HYDROCHLORIDE SCH MG: 2.5 TABLET ORAL at 18:45

## 2023-05-12 RX ADMIN — MIDODRINE HYDROCHLORIDE SCH MG: 2.5 TABLET ORAL at 13:46

## 2023-05-12 RX ADMIN — INSULIN LISPRO SCH UNIT: 100 INJECTION, SOLUTION INTRAVENOUS; SUBCUTANEOUS at 21:01

## 2023-05-12 RX ADMIN — FAMOTIDINE SCH MG: 20 TABLET, FILM COATED ORAL at 20:48

## 2023-05-12 RX ADMIN — PREGABALIN SCH MG: 100 CAPSULE ORAL at 20:47

## 2023-05-12 RX ADMIN — SODIUM CHLORIDE SCH MLS/HR: 900 INJECTION INTRAVENOUS at 22:10

## 2023-05-12 RX ADMIN — MIDODRINE HYDROCHLORIDE SCH MG: 2.5 TABLET ORAL at 05:54

## 2023-05-12 RX ADMIN — SODIUM CHLORIDE SCH MLS/HR: 900 INJECTION INTRAVENOUS at 15:01

## 2023-05-12 RX ADMIN — NOREPINEPHRINE BITARTRATE SCH: 8 INJECTION, SOLUTION INTRAVENOUS at 06:51

## 2023-05-12 RX ADMIN — SODIUM PHOSPHATE, DIBASIC, ANHYDROUS, POTASSIUM PHOSPHATE, MONOBASIC, AND SODIUM PHOSPHATE, MONOBASIC, MONOHYDRATE SCH MG: 852; 155; 130 TABLET, COATED ORAL at 08:26

## 2023-05-12 RX ADMIN — Medication SCH TAB: at 08:28

## 2023-05-12 RX ADMIN — INSULIN LISPRO SCH: 100 INJECTION, SOLUTION INTRAVENOUS; SUBCUTANEOUS at 17:03

## 2023-05-12 RX ADMIN — SODIUM CHLORIDE, PRESERVATIVE FREE SCH ML: 5 INJECTION INTRAVENOUS at 08:30

## 2023-05-12 RX ADMIN — INSULIN LISPRO SCH UNIT: 100 INJECTION, SOLUTION INTRAVENOUS; SUBCUTANEOUS at 08:27

## 2023-05-12 RX ADMIN — DOCUSATE SODIUM SCH MG: 250 CAPSULE, LIQUID FILLED ORAL at 08:29

## 2023-05-12 RX ADMIN — ASPIRIN PRN MG: 325 TABLET, COATED ORAL at 08:30

## 2023-05-12 RX ADMIN — Medication SCH MG: at 08:34

## 2023-05-12 RX ADMIN — SODIUM PHOSPHATE, DIBASIC, ANHYDROUS, POTASSIUM PHOSPHATE, MONOBASIC, AND SODIUM PHOSPHATE, MONOBASIC, MONOHYDRATE SCH MG: 852; 155; 130 TABLET, COATED ORAL at 11:38

## 2023-05-12 RX ADMIN — CALCIUM CARBONATE (ANTACID) CHEW TAB 500 MG SCH MG: 500 CHEW TAB at 08:26

## 2023-05-12 RX ADMIN — SODIUM CHLORIDE, PRESERVATIVE FREE SCH ML: 5 INJECTION INTRAVENOUS at 18:45

## 2023-05-12 RX ADMIN — SODIUM CHLORIDE SCH MLS/HR: 9 INJECTION, SOLUTION INTRAVENOUS at 02:37

## 2023-05-12 RX ADMIN — FAMOTIDINE SCH MG: 20 TABLET, FILM COATED ORAL at 08:28

## 2023-05-12 RX ADMIN — NYSTATIN SCH APPLIC: 100000 POWDER TOPICAL at 22:19

## 2023-05-12 RX ADMIN — Medication SCH MCG: at 08:29

## 2023-05-12 RX ADMIN — MORPHINE SULFATE PRN MG: 15 TABLET ORAL at 21:01

## 2023-05-12 RX ADMIN — INSULIN LISPRO SCH: 100 INJECTION, SOLUTION INTRAVENOUS; SUBCUTANEOUS at 13:46

## 2023-05-12 RX ADMIN — SODIUM CHLORIDE, PRESERVATIVE FREE SCH ML: 5 INJECTION INTRAVENOUS at 02:37

## 2023-05-12 RX ADMIN — NYSTATIN SCH APPLIC: 100000 POWDER TOPICAL at 08:31

## 2023-05-12 RX ADMIN — SODIUM CHLORIDE SCH MLS/HR: 900 INJECTION INTRAVENOUS at 05:50

## 2023-05-12 NOTE — CONSULTATION NOTE
Palliative Care Follow Up





- Referral


Referring Provider: Rebecca Collins MD


Time of Visit: 12:30-13:15


Referral setting: Hospitalized patient


Referral Reason: Pain of neoplastic origin/unknown primary met to liver





- Information Sources


Records reviewed: Previous records reviewed


History/Review of Systems obtained from: Patient


Exam limitations: No limitations





- History of Present Illness Update


Brief HPI Update: 


Please see note 5/11 for HPI


This is a 73-year-old woman who has metastatic disease to her liver, up unknown 

primary.  Unfortunately her INR is only down to 1.7 today, awaiting ability to 

have a liver biopsy.  Patient presents with worsening pain today, had been taken

off her PCA last night related to low RR 8 and sedation. Patient was put on as 

needed dosing only, unfortunately she also was receiving her tizanidine last 

night, most likely was multifactorial as far as her sedation.  She also has not 

slept for several days with good sleep.  I am seeing her she is awake and alert 

conversant, complaining of right upper quadrant pain that now is radiating down 

into her right lower abdomen area, tenderness with palpation.  She rates her 

pain a 5 out of 10, has just received her first dose of MS Contin 30 mg, but 

does have 7.5 mg of short acting morphine available.  She is pending PT 

evaluation for movement.  Patient's equal analgesic of her methadone at 

baseline, is 40 mg of morphine equivalent in 24 hours, of note will need to 

continue to monitor, explained to patient most likely not going to feel the 

effects of the time-released morphine for at least 6 to 8 hours, will make 

arrangements for patient to get short acting at this time secondary to her 

complaints of discomfort.


Family remain quite anxious appropriately regarding awaiting outcome of liver 

biopsy for further treatment path, patient is still quite weak, she reports no 

appetite, but is taking Ensure.  She has multiple family members in the room, is

in good spirits, does appear to be engaged and not confused at this time.








Social History





- Living Situation


Living arrangement: At home


Living Situation: With spouse/s.o.


Support System: 


[place] and her family, there is

2 houses as well as an apartment on the property, she and her  live in 

plan, her daughter and her family live in the other, and their grandson lives in

the apartment.  They do have 4 children total, and 10 grandkids.  They do have a

large and supportive family.  The daughters have been here taking turns to sleep

and provide support so father can go home.  Her sister has arrived as well, they

are all appropriately concerned.


Patient after her strokes, has never done rehab, she reports she does remember 

some about a visiting nurse/got exercises at 1 point but only a few visits in 

the past.  They do have some equipment such as a wheelchair, commode, and shower

bench.  She does have a bed, sleep number, that can raise up.  She is very much 

looking forward to being in it again.








Medications/Allergies





- Medications


Active Medication List: 





Active Medications





Aspirin (Aspirin Ec 325 Mg Tablet)  325 mg PO DAILY PRN


   PRN Reason: PAIN 1-4


   Last Admin: 05/12/23 08:30 Dose:  325 mg


   


Calcium Citrate (Calcium Citrate 250 Mg Tablet)  500 mg PO DAILY [facility]


   Last Admin: 05/12/23 08:34 Dose:  500 mg


   


Cholecalciferol (Cholecalciferol 25 Mcg Tablet)  25 mcg PO DAILY [facility]


   Last Admin: 05/12/23 08:29 Dose:  25 mcg


   


Dexamethasone (Dexamethasone 4 Mg Tablet)  2 mg PO BIDWM [facility]


   Last Admin: 05/12/23 08:26 Dose:  2 mg


   


Docusate Sodium (Docusate Sodium 250 Mg Capsule)  250 - 500 mg PO DAILY [facility]


   Last Admin: 05/12/23 08:29 Dose:  250 mg


   


Famotidine (Famotidine 20 Mg Tablet)  20 mg PO BID [facility]


   Last Admin: 05/12/23 08:28 Dose:  20 mg


   


Norepinephrine Bitartrate (Levophed 8 Mg/250 Ml D5w)  8 mg in 250 mls @ 15 

mls/hr IV .[id number] [facility]; Protocol


   Last Admin: 05/12/23 06:51 Dose:  Not Given


   


Cefepime HCl 2 gm/ Sodium (Chloride)  100 mls @ 200 mls/hr IV TID [facility]


   Last Admin: 05/12/23 05:50 Dose:  100 mls/hr


   


Metronidazole (Flagyl 500 Mg/100 Ml)  500 mg in 100 mls @ 100 mls/hr IV TID [facility]


   Last Admin: 05/12/23 05:52 Dose:  100 mls/hr


   


Ibuprofen (Ibuprofen 400 Mg Tablet)  400 mg PO Q4HR PRN


   PRN Reason: Pain 1 to 4


   Last Admin: 05/09/23 20:26 Dose:  400 mg


   


Insulin Human Lispro (Insulin Lispro 300 Unit/3 Ml Pen)  1 - 9 unit SUBQ 

0800,1200,1700,2100 [facility]; Protocol


   Last Admin: 05/12/23 08:27 Dose:  1 unit


   


Levothyroxine Sodium (Levothyroxine 75 Mcg Tablet)  150 mcg PO QDAC [facility]


   Last Admin: 05/12/23 06:44 Dose:  150 mcg


   


Midodrine (Midodrine 2.5 Mg Tablet)  5 mg PO TIDWM [facility]


Mineral Oil (Min Oil/Dimethicon/Coconut Oil 92 Gm Tube)  1 applic TOP PRN PRN


   PRN Reason: Skin Care


   Last Admin: 05/11/23 21:14 Dose:  1 applic


   


Morphine Sulfate (Morphine Er 15 Mg Tablet)  30 mg PO BID [facility]


   Last Admin: 05/12/23 11:39 Dose:  30 mg


   


Morphine Sulfate (Morphine Ir 15 Mg Tablet)  7.5 mg PO Q6HR PRN


   PRN Reason: BREAKTHROUGH Pain (Level 1-10)


Multivitamins/Minerals (Multivitamin W/Minerals Tablet)  1 tab PO DAILYWM [facility]


   Last Admin: 05/12/23 08:28 Dose:  1 tab


   


Naloxone HCl (Naloxone 0.4 Mg/Ml Vial)  0.4 mg IVP PRN PRN


   PRN Reason: respiration <10


Nystatin (Nystatin Powder 15 Gm)  1 applic TOP BID [facility]


   Last Admin: 05/12/23 08:31 Dose:  1 applic


   


Ondansetron HCl (Ondansetron 4 Mg/2 Ml Vial)  4 mg IVP Q4HR PRN


   PRN Reason: Nausea / Vomiting


   Last Admin: 05/08/23 11:06 Dose:  4 mg


   


Polyethylene Glycol (Polyethylene Glycol 3350 17 Gm Packet)  17 gm PO DAILY [facility]


   Last Admin: 05/12/23 08:29 Dose:  17 gm


   


Pregabalin (Pregabalin 100 Mg Capsule)  200 mg PO BID [facility]


   Last Admin: 05/12/23 08:29 Dose:  200 mg


   


Sodium Chloride (Sodium Chloride Flush 0.9% 10 Ml Syringe)  10 ml IVP PRN PRN


   PRN Reason: AS NEEDED PER PROVIDER ORDERS


   Last Admin: 05/08/23 18:23 Dose:  10 ml


   


Sodium Chloride (Sodium Chloride Flush 0.9% 10 Ml Syringe)  10 ml IVP 

0100,0900,1700 [facility]


   Last Admin: 05/12/23 08:30 Dose:  10 ml


   


Sodium Chloride (Sodium Chloride Flush 0.9% 10 Ml Syringe)  20 ml IVP PRN PRN


   PRN Reason: After Blood Draw


   Last Admin: 05/11/23 04:56 Dose:  20 ml


   


Tizanidine HCl (Tizanidine 4 Mg Tablet)  8 mg PO QPM [facility]


   Last Admin: 05/11/23 21:02 Dose:  8 mg


   





                                        





Atorvastatin Calcium 40 mg PO DAILY 02/12/21 


Furosemide [Lasix] 20 mg PO DAILY 02/12/21 


Levothyroxine Sodium [Levothyroxine] 150 mcg PO DAILY 02/12/21 


Methadone [Methadone Hcl] 5 mg PO BID 02/12/21 


Pregabalin [Lyrica] 150 mg PO BID 02/12/21 


Warfarin [Coumadin] 5 mg PO DAILY 02/12/21 


amLODIPine [Norvasc] 5 mg PO DAILY 02/12/21 


tiZANidine [Zanaflex] 8 mg PO QPM 02/12/21 


Metformin HCl [Metformin ER Osmotic] 500 mg PO BID 11/23/21 


Aspirin [Aspirin EC] 1 tab PO DAILY PRN 05/08/23 


Calcium Citrate/Vitamin D3 [Calcium Cit 315-Vit D3 250 Cpt] 2 tab PO DAILY 

05/08/23 


Cholecalciferol [Vitamin D3] 1 tab PO DAILY 05/08/23 


Famotidine [Pepcid] 1 tab PO HS PRN 05/08/23 


Lisinopril [Zestril] 1 tab PO DAILY 05/08/23 


Multivitamin with Minerals [Multivitamins with Minerals] 1 tab PO DAILY 05/08/23




Pregabalin 1 cap PO BID 05/08/23 











- Allergies


Allergies/Adverse Reactions: 


                                    Allergies











Allergy/AdvReac Type Severity Reaction Status Date / Time


 


No Known Drug Allergies Allergy   Verified 10/17/22 14:56














Review of Systems





- Constitutional


Constitutional: reports: Fatigue, Weakness, Poor appetite (has had early satiety

for a long period of time), Weight loss (20 pounds)





- Ears, Nose & Throat


Ears, Nose & Throat: reports: Hearing loss, Dry mouth





- Cardiovascular


Cardiovascular: reports: Exertional dyspnea





- Respiratory


Respiratory: reports: SOB with exertion





- Gastrointestinal


Gastrointestinal: reports: Abdominal pain, Constipation, Bloating, Poor 

appetite, Early satiety





- Musculoskeletal


Musculoskeletal: reports: Back pain, Limited range of motion, Muscle weakness, 

Assistive devices (uses cane at home), Transfer issues





- Integumentary


Integumentary: reports: Dryness





- Neurological


Neurological: reports: General weakness, Numbness, Pre-existing deficit, 

Abnormal gait





- Psychiatric


Psychiatric: reports: Anxiety





- Endocrine


Endocrine: reports: Diabetes type 2, Hypothyroidism





- Hematologic/Lymphatic


Hematologic/Lymph: reports: Anemia





- All Other Systems


All Other Systems: reports: Other (limited ROS;)





Physical Exam





- Vital Signs


Vital Signs: 





                                Vital Signs x48h











  Temp Pulse Resp BP Pulse Ox O2 Flow Rate


 


 05/12/23 12:00  37.0 C  55 L  16  129/71  96  2


 


 05/12/23 11:00   43 L  9 L  113/54 L  92  2


 


 05/12/23 10:19       2


 


 05/12/23 10:00  36.8 C  45 L  10 L  116/57 L  97  2


 


 05/12/23 09:00  36.7 C  58 L  12  126/74  95  2


 


 05/12/23 08:00  36.6 C  46 L  14  121/56 L  96  2


 


 05/12/23 07:00   53 L  14  104/60  97  2


 


 05/12/23 06:00   46 L  14  109/64  95  2














- Physical Exam


General Appearance: positive: Mild distress (with pain)


Eyes Bilateral: positive: Normal inspection


ENT: positive: Dry mucous membranes


Neck: positive: Trachea midline


Cardiovascular: positive: Regular rate & rhythm


Respiratory: positive: No respiratory distress


Abdomen: positive: Tenderness (right upper quadrant), Guarding, Distended, Obese


Skin: positive: Pallor, Dryness


Extremities: positive: No pedal edema


Neurologic/Psychiatric: positive: Oriented x3, Depressed mood/affect, Flat 

affect





Palliative Care





- POLST


Patient has POLST: Yes


POLST Status: DNR, Selective Treatment


Pain: Severity (5/10), Pattern (Persistent; perceives worsening)





Results





- Lab Results


Lab results reviewed: Yes


Fish Bones: 


                                 05/12/23 04:40





                                 05/12/23 04:40


Lab and Imaging Results: 





                               Lab Results x24hrs











  05/12/23 05/12/23 05/12/23 Range/Units





  12:39 08:03 04:40 


 


WBC    10.0  (4.8-10.8)  x10^3/uL


 


RBC    3.26 L  (4.20-5.40)  10^6/uL


 


Hgb    9.7 L  (12.0-16.0)  g/dL


 


Hct    31.8 L  (37.0-47.0)  %


 


MCV    97.5  (81.0-99.0)  fL


 


MCH    29.8  (27.0-31.0)  pg


 


MCHC    30.5 L  (32.0-36.0)  g/dL


 


RDW    15.2 H  (12.0-15.0)  %


 


Plt Count    192  (130-450)  10^3/uL


 


MPV    11.5 H  (7.9-10.8)  fL


 


Neut # (Auto)    8.3 H  (1.5-6.6)  10^3/uL


 


Lymph # (Auto)    0.8 L  (1.5-3.5)  10^3/uL


 


Mono # (Auto)    0.9  (0.0-1.0)  10^3/uL


 


Eos # (Auto)    0.0  (0.0-0.7)  10^3/uL


 


Baso # (Auto)    0.0  (0.0-0.1)  10^3/uL


 


Absolute Nucleated RBC    0.00  x10^3/uL


 


Nucleated RBC %    0.0  /100WBC


 


PT     (9.9-12.6)  secs


 


INR     (0.8-1.2)  


 


VBG pH     (7.31-7.41)  


 


Ionized Calcium     (1.15-1.33)  mmol/L


 


Sodium     (135-145)  mmol/L


 


Potassium     (3.5-5.0)  mmol/L


 


Chloride     (101-111)  mmol/L


 


Carbon Dioxide     (21-32)  mmol/L


 


Anion Gap     (6-13)  


 


BUN     (6-20)  mg/dL


 


Creatinine     (0.4-1.0)  mg/dL


 


Estimated GFR (MDRD)     (>89)  


 


Glucose     ()  mg/dL


 


POC Whole Bld Glucose  156 H  184 H   (70 - 100)  mg/dL


 


Calcium     (8.5-10.3)  mg/dL


 


Phosphorus     (2.5-4.6)  mg/dL


 


Magnesium     (1.7-2.8)  mg/dL














  05/12/23 05/12/23 05/12/23 Range/Units





  04:40 04:40 04:40 


 


WBC     (4.8-10.8)  x10^3/uL


 


RBC     (4.20-5.40)  10^6/uL


 


Hgb     (12.0-16.0)  g/dL


 


Hct     (37.0-47.0)  %


 


MCV     (81.0-99.0)  fL


 


MCH     (27.0-31.0)  pg


 


MCHC     (32.0-36.0)  g/dL


 


RDW     (12.0-15.0)  %


 


Plt Count     (130-450)  10^3/uL


 


MPV     (7.9-10.8)  fL


 


Neut # (Auto)     (1.5-6.6)  10^3/uL


 


Lymph # (Auto)     (1.5-3.5)  10^3/uL


 


Mono # (Auto)     (0.0-1.0)  10^3/uL


 


Eos # (Auto)     (0.0-0.7)  10^3/uL


 


Baso # (Auto)     (0.0-0.1)  10^3/uL


 


Absolute Nucleated RBC     x10^3/uL


 


Nucleated RBC %     /100WBC


 


PT    18.8 H  (9.9-12.6)  secs


 


INR    1.7 H  (0.8-1.2)  


 


VBG pH  7.367    (7.31-7.41)  


 


Ionized Calcium  1.08 L    (1.15-1.33)  mmol/L


 


Sodium   140   (135-145)  mmol/L


 


Potassium   4.0   (3.5-5.0)  mmol/L


 


Chloride   102   (101-111)  mmol/L


 


Carbon Dioxide   28   (21-32)  mmol/L


 


Anion Gap   10.0   (6-13)  


 


BUN   12   (6-20)  mg/dL


 


Creatinine   0.6   (0.4-1.0)  mg/dL


 


Estimated GFR (MDRD)   99   (>89)  


 


Glucose   157 H   ()  mg/dL


 


POC Whole Bld Glucose     (70 - 100)  mg/dL


 


Calcium   7.5 L   (8.5-10.3)  mg/dL


 


Phosphorus   2.2 L   (2.5-4.6)  mg/dL


 


Magnesium   2.1   (1.7-2.8)  mg/dL














  05/11/23 05/11/23 05/11/23 Range/Units





  20:33 19:06 17:03 


 


WBC     (4.8-10.8)  x10^3/uL


 


RBC     (4.20-5.40)  10^6/uL


 


Hgb     (12.0-16.0)  g/dL


 


Hct     (37.0-47.0)  %


 


MCV     (81.0-99.0)  fL


 


MCH     (27.0-31.0)  pg


 


MCHC     (32.0-36.0)  g/dL


 


RDW     (12.0-15.0)  %


 


Plt Count     (130-450)  10^3/uL


 


MPV     (7.9-10.8)  fL


 


Neut # (Auto)     (1.5-6.6)  10^3/uL


 


Lymph # (Auto)     (1.5-3.5)  10^3/uL


 


Mono # (Auto)     (0.0-1.0)  10^3/uL


 


Eos # (Auto)     (0.0-0.7)  10^3/uL


 


Baso # (Auto)     (0.0-0.1)  10^3/uL


 


Absolute Nucleated RBC     x10^3/uL


 


Nucleated RBC %     /100WBC


 


PT     (9.9-12.6)  secs


 


INR     (0.8-1.2)  


 


VBG pH     (7.31-7.41)  


 


Ionized Calcium     (1.15-1.33)  mmol/L


 


Sodium     (135-145)  mmol/L


 


Potassium   4.0   (3.5-5.0)  mmol/L


 


Chloride     (101-111)  mmol/L


 


Carbon Dioxide     (21-32)  mmol/L


 


Anion Gap     (6-13)  


 


BUN     (6-20)  mg/dL


 


Creatinine     (0.4-1.0)  mg/dL


 


Estimated GFR (MDRD)     (>89)  


 


Glucose     ()  mg/dL


 


POC Whole Bld Glucose  197 H   230 H  (70 - 100)  mg/dL


 


Calcium     (8.5-10.3)  mg/dL


 


Phosphorus     (2.5-4.6)  mg/dL


 


Magnesium     (1.7-2.8)  mg/dL














Impression and Recommendations





- Palliative Care


Impression: 


This is a 73-year-old woman who presents unexpectedly with cancer of unknown 

origin with metastatic disease to the liver, still pending liver biopsy.  

Patient is improving off of her acute issues, and moving towards stabilization. 

Still is quite weak, working with PT/OT, patient presents with pain of 

neoplastic origin and the complexity in setting of chronic pain.  Palliative 

care seeing patient for introduction for palliative care support, pain 

management anticipatory guidance and goals of care.





Recommendations/Counseling Done: 


1. Pain of neoplastic origin.  Patient currently off her methadone based dosing 

which is 5 mg twice daily, morphine equivalent of 40 mg.  Patient did not stay 

on sustained MS basal rate, but was fairly comfortable on 1 mg/h, which would 

have been a's 70 mg morphine equivalent.  At this point she has been started on 

MS Contin 30 mg twice daily, will need continued monitoring, most likely this is

underdosing but given accommodating for cross tolerance appropriate currently.  

Would continue to follow her as needed breakthrough dosing, and titrate in 48 

hours based on patient's use for as needed morphine.





2.  Cancer of unknown origin metastatic to liver, stage IV.  Patient still 

awaiting liver biopsy, hopefully as he can have it on Monday.  Call to primary 

care office to facilitate getting [place] [facility]

preference is to be treated locally if possible.





3.  Goals of care.  They are still interested in bringing her home, would 

transition her home with home physical therapy and Occupational Therapy, and 

nursing follow-up for monitoring for pain management and constipation.  They do 

not have a preference of agencies, first available.  Coordination of care with i

npatient hospitalist today regarding plan of care.  Requested outpatient 

referral for palliative care as well.  We will follow-up with patient on Monday 

for final recommendations if not discharged.





45 minutes in review of chart, counseling regarding pain and symptom management 

and goals of care, coordination of care with primary care office and inpatient.

## 2023-05-12 NOTE — PROVIDER PROGRESS NOTE
Assessment and Plan


This is DOL# [ ], HD# [ ] for MACHO JOHNSON born via  at [birth year] 20:12 to a 

yo G  now P [] at  wk EGA.











(1) Hypotension


Impression: 





She was transferred to the ICU May 9 for her hypotension.  In this unfortunate 

female, hypotension could be from the disease process in her liver.  CT scan 

shows extensive, multiple, masses in the liver.  A left paracolic gutter nodule,

 and lymphadenopathy.  She does not appear to have signs and symptoms of sepsis 

other than hypotension and no objective source on CT, CXR or UA.   There is no 

pulmonary, GI, or  findings other than pericholecystic inflammation on CT done

 on admission.  She is tender in the right upper quadrant.  She is not having an

 MI.  She does not have a GI bleed but could be bleeding in her liver due to 

mets and an elevated INR (but no tachycardia on vitals).  She is not 

intravascularly depleted with dehydration





Treatment consisted of Levophed to keep mean arterial pressure at 65. The repeat

 abdomen pelvis CT done yesterday for the intractable right upper quadrant pain 

has no evidence of obstruction.  She continues to have left paracolic gutter 

lesion, a liver that is filled with innumerable masses.  She has nonspecific 

pericholecystic fluid and wall thickening.  The biliary tree and pancreatic duct

 are prominent without definite pathologic dilation or obstruction.  Possible 

small splenic cyst.  Pathologic appearing lymph nodes.  Pelvis has a small 

amount of free fluid.  Elias is in place with an under distended bladder.  She 

had a hysterectomy.





Since infection could not be completely ruled out,  I started her on broad-

spectrum antibiotics with cefepime, vancomycin, and Flagyl.  Today is day #3.





She did have blood cultures on May 7.  Those were positive today with gram-

positive cocci.  PCR has staph species.  Since they became positive 3 days after

 they were done, I suspect this is contamination. I also ordered blood cultures 

on May 9.  Those blood cultures have had no growth after 24 hours.





Levophed continues at 2 mcg.  Blood pressure is 136 systolic at times.  Urine 

output is maintained.





Plan:


Stop empiric antibiotics and monitor for signs and symptoms of infection


I Reduced Levophed to 1 mcg and see how she does.  I have again encouraged 

nursing to see if we can get her off the Levophed.


I have asked nursing to please get this patient out of bed and into a chair.  

And I have asked physical therapy to start working with her.





5/12/2023 - Patient is currently off of IV Levophed and is having adequate blood

 pressure support today





(2) Supratherapeutic INR


Impression: 


When the patient was admitted she was admitted with an INR of greater than 10.  

She takes medication for atrial fibrillation which includes Coumadin.  She was 

taking her medication in spite of not having any p.o. intake for 4 days.  

Between the liver being replaced by cancer, dehydration, and taking her 

Coumadin, she was supratherapeutic.  She received FFP on admission.  On the 

second day her INR was 4.7 and she received vitamin K.  the third day, her INR 

was 4.3 and she received more vitamin K.  We are attempting to get her down to 

less than 1.5.  She needs this for radiology for liver biopsy.





On the 4th day, INR was 1.9.  Radiology had cancelled her biopsy and has asked 

that I treat her again for the INR.  Family is understandably worried and upset.

  They really want this biopsy to see what the diagnosis is going to be to them 

let oncology know what treatment would be.





I ordered fresh frozen plasma.  But we ended up not using it.  Radiology 

reviewed the chart and felt that since the patient was on Levophed, 

"hemodynamically unstable", she was not a good candidate for liver biopsy due to

 the risk of bleeding.  As such the biopsy was canceled.  Radiology was able to 

come and speak to the family and explained their thought process to the family.





5/12/23 -


-Awaiting INR to be 1.5 per interventional radiology in order to do biopsy





(3) RUQ abdominal pain


Impression: 


This unfortunate female has already had imaging studies that tell us that she 

has multiple, multiple masses to her liver.  The presumptive thought is that she

 has metastatic disease of unknown primary.  There is nodularity along the 

capsule as well as the parenchyma.  Gallbladder is unremarkable.  No 

hydronephrosis.  This was seen on the admission abdomen/pelvis CT May 7.  That 

was done without contrast.  Abdominal ultrasound also shows innumerable hypoe

choic mass lesions.  A representative large complex mass in the posterior right 

hepatic lobe measures 6.2 cm.  The intrahepatic ducts appear nondilated.  There 

is dilation of the extrahepatic duct with the common bile duct 1 cm (think 

primary hepatobiliary w mets or a passed stone).  Her total bili was 1.7 on 

admission.  It is 1.1 today.  AST was 206 on admission and is 66 today.  ALT was

 74 on admission and is now 45 today.  Alk phos was 311 and is 220 today.  

Ammonia level is 15.3.





5/9 The radiologist and I discussed the previous CT.  And our plans moving 

forward.  Our obvious suspicion is that she has cancer, either hepatobiliary of 

mets.  Or unknown primary with mets.  I am hoping for a pathologic piece of 

tissue to give us the diagnosis. 





I was worried that she may be bleeding in her liver causing such new intractable

 pain.  After I reviewed her liver function studies, I also thought she could be

 having gallbladder symptoms even though she did not have stones on the 

admission CT.  If her elevated liver function studies were from tumor, they 

should not be going down.  And they are going down. Radiologist is worried that 

they do not have a contrasted CAT scan to let him know if they will be getting 

into trouble with an angioma.





So I ordered a multiphase CT of the abdomen 5/9.  There is no hemangioma.  No 

bleeding under the capsule. The liver masses were much better delineated for the

 radiologist.  We agreed that as long as I can get her INR below 2 she could go 

to biopsy on 5/10. 





Then I reviewed the radiology literature with 5/10 radiologist.  Their 

guidelines state that the INR needs to be below 1.5.  See problem #2.





Her pain is better with the morphine PCA pump.  I have ordered a 1 mg/h 

continuous infusion rate.  I am allowing her 1 mg every 10 minutes with a max of

 8 mg in 4 hours and I ordered a Palliative Care consult. 





Plan:


Continue above PCA pump


Palliative care has seen the patient today.  She was seen in the late afternoon 

and they have not had time to dictate their note.  They predict that this 

patient will most likely need to be discharged on long-acting opioids.  They 

will review how much morphine she has had delivered over the last 2 days and 

calculate an appropriate conversion dose and let me know.





5/12/23 - Appreciate palliative care consult.  Will change pain medication 

regimen per palliative care recommendation.Anticipate IR obtained liver biopsy.





(4) Altered mental status resolved


Impression: 


 By May 9 she was alert enough to process the information I was telling her and 

be appropriately grief stricken and fearful about the cancer diagnosis.  She was

 able to tell me what she was feeling, able to describe her pain well.  ,

 who was at the bedside yesterday, endorsed that she was the best she has been 

since being in the hospital.





She continues to be fatigued, weak, but is oriented to person place and time.





Plan:


Most likely her altered mental status was from the dehydration and the pain of 

liver mets.  I have encouraged nursing to get the patient out of bed and in a 

chair.  She is lost a lot of ground.  Plan is for her to be discharged home and 

want her to be able to stand and pivot and walk 1 step





(5) Acute kidney injury resolved. 


Labs were all reviewed.  She normally runs a creatinine of 0.8.  With her 

history of 4 days of poor oral intake, she is dehydrated and this caused her 

BEN.  Her BUN/creatinine on admission were 41/2.3. Not only was her lisinopril 

stopped, she was started on IV fluids and >> 34/1.6>>18/0.9>>13/0.7 today. 


Plan:


Avoid nephrotoxins


Continue with IV fluids


Follow BMP daily





(6) Liver masses


Her CT scan then the ultrasound done  showed "innumerable" masses in the liver. 

 There is one particularly large mass measuring 6.2 cm in the right posterior 

lobe.


The ED provider was the first 1 to tell the patient and  that she 

probably has cancer. We had planned to get tissue sample for pathology.  

Conversation was held with radiology on May 8 and we needed to normalize her 

INR.  On May 8 she received vitamin K and FFP.  In spite of that, her INR is 

still over 4 today.  Today's radiologist wanted better imaging with contrast and

 a repeat CT was done.  Not only does she have the same liver masses, she 

appears to have nodules in the paracolic gutter indicating carcinomatosis.  

Lymphadenopathy.  She is still not ready for biopsy because of INR.  I am also 

noting that she has changes of inflammation on her gallbladder.  Could you have 

passed a stone?  Could she have primary hepatobiliary cancer that has 

metastasized.





In speaking to radiology I repeated the CT of abdomen and used contrast 5/9,  

and the repeat CAT scan has been helpful so he does not have to worry about a 

hemangioma.





I also spoke to oncology in the medical ambulatory clinic 5/9.  I presented the 

case to the oncologist.  Her hypotension and possible sepsis is definitely 

concerning.  But they feel that if we can get her through this temporary phase 

of illness, and have a tissue diagnosis, there are therapies that could allow 

her a decent quality of life in an extended life of a few years.  They feel it 

is worth getting a liver biopsy and moving forward with at least getting a 

tissue diagnosis.





I also spoke to the patient about CODE STATUS on 5/9.  She really did understand

 the possibility that she could have a terminal illness and she was 

understandably tearful and grief stricken.  This is all happening so fast.  I 

discussed what the resuscitative effort would mean with regards to CPR and 

intubation.  She states that she does not want that.  While she is willing to go

 to do treatment, get a biopsy, and see how she does, she feels that a full on 

resuscitative measures with CPR and intubation would not be what she wants.  

"What would be the point" is her statement. So CODE STATUS was changed to DO NOT

 RESUSCITATE.





Some of the tumor markers have already come back.  Alpha-fetoprotein is 1.8.  

Normal levels.  CA 15-3 is 623.  High.  CA 19-9 antigen is pending.  Ca1 25 

antigen is 14 and within normal range.  CEA is 1633.5. I started Decadron 

yesterday.  Some oncology literature suggest that pain is relieved by the anti-

inflammatory effects of the steroids.





Plan:


Tumor markers as already discussed above





She has a POLST form at the bedside that she would like me to fill out with her.

 I was not able to do that yesterday. She is asked that her  and daughter

 Juhi be the points of contact for the POLST form





By May 15, if she is still here, and off of the Levophed, I would request a 

liver biopsy before d





5/12/23-anticipate liver biopsy per IR





(8) Transaminitis


The family was all in the room and reported that the patient gets yearly thoroug

h blood tests.  These were done in October 2022 and the LFTs were normal then ( 

I reviewed all labs in this EMR).


I questioned her about alcohol use and she hardly uses any.  The  and 

other family members concur. In following her liver function studies daily, they

 are improving.  If she truly had LFTs that were elevated because of tumor, the 

LFTs would not be getting better.  Since they are getting better it makes me 

wonder if she has a transitory process such as cholecystitis or a passed stone 

giving her the right upper quadrant pain that so severe today. I reiterated to 

them that her liver function studies could be normal in spite of having tumor in

 the liver.  I really do not think that something was missed in October on the 

basis of labs.


Plan:


Follow LFTs daily


Avoid hepatotoxins


Work-up of the liver mets as described in #6 above








(8) DM II


The patient takes metformin.  The  admitted she does not really follow a 

diabetic diet. Our treatment consist of ACHS fingerstick checks and sliding 

scale insulin coverage for this. A1c is 7.1%.  


5/8 her glucose was 96, 124, 154, 131.


5/9 her glucose was 108, 139. , 112, 139


May 10: 132, 143, 200


Today 163, 240 (I did start her on Decadron yesterday)


 She is eating 0 to 75% of her food. She says that she appreciates her diet 

being changed to regular diet on May 9.  She really did not like the dysphagia 

pured diet.


Plan:


Continue with ACHS fingerstick checks and sliding scale insulin coverage. 

Advance coverage tomorrow if her glucose is consistently above 200





(9) Chronic pain


I asked the patient and  why she is on methadone.  There is a history of 

previously needing to be on oxycodone at very high doses which was then 

transitioned to methadone.  Her chronic pain was in her neck and lower back. 


5/9 her right upper quadrant pain is so severe that she is requiring doses of 

morphine IV that are not holding her.  It is not her diffuse pain that is 

bothering her.


I stopped the methadone and started her on a PCA pump. Her pain is better 

controlled with the PCA pump.


5/11:  Part of the reason I requested a palliative care consult was discharge 

transitioning this patient to oral meds to control her pain.  Consult will be 

dictated later today and she will be making recommendations.





Plan:


PCA pump as noted above





5/12/23-patient will be transferred off of PCA and administer pain meds as 

recommended by palliative care





(10) Hypothyroidism


Her TSH is elevated but free T4 is normal.


Plan:


Continue with her same home thyroid replacement dose





(11) Hx of CVA


Patient has had 3 strokes, 2003, 2013 and 2014.  The chart indicates she has a 

"intracardiac shunt". She has chronic weakness on one side.  She uses walk


She is on chronic warfarin since those strokes


Plan:


Eventually she will need PT and OT evaluations.





(12)  BOBBI





Plan: I have reiterated to her family that her home machine needs to be brought 

in here.  Especially in view of the fact of the morphine use.











Subjective: Patient's mental status is slightly improved and is slightly doing 

better with pain control





Objective: Vital signs and nursing notes reviewed


Head: Normocephalic atraumatic


General: Ill-appearing


Mouth: No lesions


Neck: Supple


Chest: Clear to auscultation


Cor: Regular rate and rhythm S1-S2


Abdomen: Soft tenderness in the right upper quadrant no rebound no guarding


Extremities: Trace bilateral pedal edema


Skin: Slightly decreased turgor


Psych: Mood and affect are appropriate but slightly blunted affect


Neuro: Alert and oriented x3, motor strength intact bilaterally

## 2023-05-13 LAB
ANION GAP SERPL CALCULATED.4IONS-SCNC: 7 MMOL/L (ref 6–13)
BASOPHILS NFR BLD AUTO: 0 10^3/UL (ref 0–0.1)
BASOPHILS NFR BLD AUTO: 0.2 %
BUN SERPL-MCNC: 17 MG/DL (ref 6–20)
CALCIUM UR-MCNC: 8.1 MG/DL (ref 8.5–10.3)
CHLORIDE SERPL-SCNC: 103 MMOL/L (ref 101–111)
CO2 SERPL-SCNC: 33 MMOL/L (ref 21–32)
CREAT SERPLBLD-SCNC: 0.6 MG/DL (ref 0.4–1)
EOSINOPHIL # BLD AUTO: 0 10^3/UL (ref 0–0.7)
EOSINOPHIL NFR BLD AUTO: 0 %
ERYTHROCYTE [DISTWIDTH] IN BLOOD BY AUTOMATED COUNT: 15.7 % (ref 12–15)
GFRSERPLBLD MDRD-ARVRAT: 99 ML/MIN/{1.73_M2} (ref 89–?)
GLUCOSE SERPL-MCNC: 147 MG/DL (ref 70–100)
HCT VFR BLD AUTO: 33 % (ref 37–47)
HGB UR QL STRIP: 10.1 G/DL (ref 12–16)
INR PPP: 1.8 (ref 0.8–1.2)
LYMPHOCYTES # SPEC AUTO: 0.9 10^3/UL (ref 1.5–3.5)
LYMPHOCYTES NFR BLD AUTO: 8.4 %
MCH RBC QN AUTO: 29.8 PG (ref 27–31)
MCHC RBC AUTO-ENTMCNC: 30.6 G/DL (ref 32–36)
MCV RBC AUTO: 97.3 FL (ref 81–99)
MONOCYTES # BLD AUTO: 1.2 10^3/UL (ref 0–1)
MONOCYTES NFR BLD AUTO: 10.8 %
NEUTROPHILS # BLD AUTO: 8.6 10^3/UL (ref 1.5–6.6)
NEUTROPHILS # SNV AUTO: 10.8 X10^3/UL (ref 4.8–10.8)
NEUTROPHILS NFR BLD AUTO: 79 %
NRBC # BLD AUTO: 0.02 X10^3/UL
NRBC # BLD AUTO: 0.2 /100WBC
PDW BLD AUTO: 11.2 FL (ref 7.9–10.8)
PLATELET # BLD: 189 10^3/UL (ref 130–450)
POTASSIUM SERPL-SCNC: 3.9 MMOL/L (ref 3.5–5)
PROTHROM ACT/NOR PPP: 19.7 SECS (ref 9.9–12.6)
RBC MAR: 3.39 10^6/UL (ref 4.2–5.4)
SODIUM SERPLBLD-SCNC: 143 MMOL/L (ref 135–145)

## 2023-05-13 RX ADMIN — MORPHINE SULFATE PRN MG: 15 TABLET ORAL at 03:29

## 2023-05-13 RX ADMIN — INSULIN LISPRO SCH UNIT: 100 INJECTION, SOLUTION INTRAVENOUS; SUBCUTANEOUS at 16:48

## 2023-05-13 RX ADMIN — SODIUM CHLORIDE, PRESERVATIVE FREE SCH ML: 5 INJECTION INTRAVENOUS at 03:31

## 2023-05-13 RX ADMIN — NYSTATIN SCH APPLIC: 100000 POWDER TOPICAL at 21:08

## 2023-05-13 RX ADMIN — DIMETHICONE PRN APPLIC: 13000 CREAM TOPICAL at 06:14

## 2023-05-13 RX ADMIN — MIDODRINE HYDROCHLORIDE SCH MG: 2.5 TABLET ORAL at 13:21

## 2023-05-13 RX ADMIN — NOREPINEPHRINE BITARTRATE SCH: 8 INJECTION, SOLUTION INTRAVENOUS at 07:54

## 2023-05-13 RX ADMIN — MIDODRINE HYDROCHLORIDE SCH MG: 2.5 TABLET ORAL at 16:48

## 2023-05-13 RX ADMIN — MIDODRINE HYDROCHLORIDE SCH MG: 2.5 TABLET ORAL at 08:50

## 2023-05-13 RX ADMIN — LEVOTHYROXINE SODIUM SCH MCG: 75 TABLET ORAL at 06:43

## 2023-05-13 RX ADMIN — PREGABALIN SCH MG: 100 CAPSULE ORAL at 08:53

## 2023-05-13 RX ADMIN — MORPHINE SULFATE SCH MG: 30 TABLET, FILM COATED, EXTENDED RELEASE ORAL at 21:03

## 2023-05-13 RX ADMIN — MORPHINE SULFATE SCH MG: 30 TABLET, FILM COATED, EXTENDED RELEASE ORAL at 08:52

## 2023-05-13 RX ADMIN — INSULIN LISPRO SCH UNIT: 100 INJECTION, SOLUTION INTRAVENOUS; SUBCUTANEOUS at 12:09

## 2023-05-13 RX ADMIN — FAMOTIDINE SCH MG: 20 TABLET, FILM COATED ORAL at 21:04

## 2023-05-13 RX ADMIN — DOCUSATE SODIUM SCH MG: 250 CAPSULE, LIQUID FILLED ORAL at 08:51

## 2023-05-13 RX ADMIN — SODIUM CHLORIDE, PRESERVATIVE FREE SCH ML: 5 INJECTION INTRAVENOUS at 08:53

## 2023-05-13 RX ADMIN — FAMOTIDINE SCH MG: 20 TABLET, FILM COATED ORAL at 08:52

## 2023-05-13 RX ADMIN — INSULIN LISPRO SCH UNIT: 100 INJECTION, SOLUTION INTRAVENOUS; SUBCUTANEOUS at 21:04

## 2023-05-13 RX ADMIN — ONDANSETRON PRN MG: 2 INJECTION INTRAMUSCULAR; INTRAVENOUS at 21:13

## 2023-05-13 RX ADMIN — ONDANSETRON PRN MG: 2 INJECTION INTRAMUSCULAR; INTRAVENOUS at 11:16

## 2023-05-13 RX ADMIN — PREGABALIN SCH MG: 100 CAPSULE ORAL at 21:03

## 2023-05-13 RX ADMIN — SODIUM CHLORIDE, PRESERVATIVE FREE SCH ML: 5 INJECTION INTRAVENOUS at 16:48

## 2023-05-13 RX ADMIN — ASPIRIN PRN MG: 325 TABLET, COATED ORAL at 14:39

## 2023-05-13 RX ADMIN — NYSTATIN SCH APPLIC: 100000 POWDER TOPICAL at 08:52

## 2023-05-13 RX ADMIN — INSULIN LISPRO SCH: 100 INJECTION, SOLUTION INTRAVENOUS; SUBCUTANEOUS at 08:50

## 2023-05-13 RX ADMIN — Medication SCH TAB: at 08:51

## 2023-05-13 RX ADMIN — Medication SCH MG: at 08:51

## 2023-05-13 RX ADMIN — MORPHINE SULFATE PRN MG: 15 TABLET ORAL at 16:16

## 2023-05-13 RX ADMIN — Medication SCH MCG: at 08:51

## 2023-05-13 RX ADMIN — SODIUM CHLORIDE SCH MLS/HR: 900 INJECTION INTRAVENOUS at 06:07

## 2023-05-13 NOTE — PROVIDER PROGRESS NOTE
Progress Note








Assessment and Plan


This is DOL# [ ], HD# [ ] for MACHO JOHNSON born via  at [birth year] 20:12 to a 

yo G  now P [] at  wk EGA.











(1) Hypotension


Impression: 





She was transferred to the ICU May 9 for her hypotension.  In this unfortunate 

female, hypotension could be from the disease process in her liver.  CT scan 

shows extensive, multiple, masses in the liver.  A left paracolic gutter nodule,

 and lymphadenopathy.  She does not appear to have signs and symptoms of sepsis 

other than hypotension and no objective source on CT, CXR or UA.   There is no 

pulmonary, GI, or  findings other than pericholecystic inflammation on CT done

 on admission.  She is tender in the right upper quadrant.  She is not having an

 MI.  She does not have a GI bleed but could be bleeding in her liver due to 

mets and an elevated INR (but no tachycardia on vitals).  She is not 

intravascularly depleted with dehydration





Treatment consisted of Levophed to keep mean arterial pressure at 65. The repeat

 abdomen pelvis CT done yesterday for the intractable right upper quadrant pain 

has no evidence of obstruction.  She continues to have left paracolic gutter 

lesion, a liver that is filled with innumerable masses.  She has nonspecific 

pericholecystic fluid and wall thickening.  The biliary tree and pancreatic duct

 are prominent without definite pathologic dilation or obstruction.  Possible 

small splenic cyst.  Pathologic appearing lymph nodes.  Pelvis has a small 

amount of free fluid.  Elias is in place with an under distended bladder.  She 

had a hysterectomy.





Since infection could not be completely ruled out,  I started her on broad-

spectrum antibiotics with cefepime, vancomycin, and Flagyl.  Today is day #3.





She did have blood cultures on May 7.  Those were positive today with gram-

positive cocci.  PCR has staph species.  Since they became positive 3 days after

 they were done, I suspect this is contamination. I also ordered blood cultures 

on May 9.  Those blood cultures have had no growth after 24 hours.





Levophed continues at 2 mcg.  Blood pressure is 136 systolic at times.  Urine 

output is maintained.





Plan:


Stop empiric antibiotics and monitor for signs and symptoms of infection


I Reduced Levophed to 1 mcg and see how she does.  I have again encouraged 

nursing to see if we can get her off the Levophed.


I have asked nursing to please get this patient out of bed and into a chair.  

And I have asked physical therapy to start working with her.





5/12/2023 - Patient is currently off of IV Levophed and is having adequate blood

 pressure support today





(2) Supratherapeutic INR


Impression: 


When the patient was admitted she was admitted with an INR of greater than 10.  

She takes medication for atrial fibrillation which includes Coumadin.  She was 

taking her medication in spite of not having any p.o. intake for 4 days.  

Between the liver being replaced by cancer, dehydration, and taking her 

Coumadin, she was supratherapeutic.  She received FFP on admission.  On the 

second day her INR was 4.7 and she received vitamin K.  the third day, her INR 

was 4.3 and she received more vitamin K.  We are attempting to get her down to 

less than 1.5.  She needs this for radiology for liver biopsy.





On the 4th day, INR was 1.9.  Radiology had cancelled her biopsy and has asked 

that I treat her again for the INR.  Family is understandably worried and upset.

  They really want this biopsy to see what the diagnosis is going to be to them 

let oncology know what treatment would be.





I ordered fresh frozen plasma.  But we ended up not using it.  Radiology 

reviewed the chart and felt that since the patient was on Levophed, 

"hemodynamically unstable", she was not a good candidate for liver biopsy due to

 the risk of bleeding.  As such the biopsy was canceled.  Radiology was able to 

come and speak to the family and explained their thought process to the family.





5/12/23 -


-Awaiting INR to be 1.5 per interventional radiology in order to do biopsy





(3) RUQ abdominal pain


Impression: 


This unfortunate female has already had imaging studies that tell us that she 

has multiple, multiple masses to her liver.  The presumptive thought is that she

 has metastatic disease of unknown primary.  There is nodularity along the 

capsule as well as the parenchyma.  Gallbladder is unremarkable.  No 

hydronephrosis.  This was seen on the admission abdomen/pelvis CT May 7.  That 

was done without contrast.  Abdominal ultrasound also shows innumerable 

hypoechoic mass lesions.  A representative large complex mass in the posterior 

right hepatic lobe measures 6.2 cm.  The intrahepatic ducts appear nondilated.  

There is dilation of the extrahepatic duct with the common bile duct 1 cm (think

 primary hepatobiliary w mets or a passed stone).  Her total bili was 1.7 on 

admission.  It is 1.1 today.  AST was 206 on admission and is 66 today.  ALT was

 74 on admission and is now 45 today.  Alk phos was 311 and is 220 today.  

Ammonia level is 15.3.





5/9 The radiologist and I discussed the previous CT.  And our plans moving 

forward.  Our obvious suspicion is that she has cancer, either hepatobiliary of 

mets.  Or unknown primary with mets.  I am hoping for a pathologic piece of 

tissue to give us the diagnosis. 





I was worried that she may be bleeding in her liver causing such new intractable

 pain.  After I reviewed her liver function studies, I also thought she could be

 having gallbladder symptoms even though she did not have stones on the 

admission CT.  If her elevated liver function studies were from tumor, they 

should not be going down.  And they are going down. Radiologist is worried that 

they do not have a contrasted CAT scan to let him know if they will be getting 

into trouble with an angioma.





So I ordered a multiphase CT of the abdomen 5/9.  There is no hemangioma.  No 

bleeding under the capsule. The liver masses were much better delineated for the

 radiologist.  We agreed that as long as I can get her INR below 2 she could go 

to biopsy on 5/10. 





Then I reviewed the radiology literature with 5/10 radiologist.  Their 

guidelines state that the INR needs to be below 1.5.  See problem #2.





Her pain is better with the morphine PCA pump.  I have ordered a 1 mg/h 

continuous infusion rate.  I am allowing her 1 mg every 10 minutes with a max of

 8 mg in 4 hours and I ordered a Palliative Care consult. 





Plan:


Continue above PCA pump


Palliative care has seen the patient today.  She was seen in the late afternoon 

and they have not had time to dictate their note.  They predict that this 

patient will most likely need to be discharged on long-acting opioids.  They 

will review how much morphine she has had delivered over the last 2 days and 

calculate an appropriate conversion dose and let me know.





5/12/23 - Appreciate palliative care consult.  Will change pain medication 

regimen per palliative care recommendation.Anticipate IR obtained liver biopsy.





(4) Altered mental status resolved


Impression: 


 By May 9 she was alert enough to process the information I was telling her and 

be appropriately grief stricken and fearful about the cancer diagnosis.  She was

 able to tell me what she was feeling, able to describe her pain well.  ,

 who was at the bedside yesterday, endorsed that she was the best she has been 

since being in the hospital.





She continues to be fatigued, weak, but is oriented to person place and time.





Plan:


Most likely her altered mental status was from the dehydration and the pain of 

liver mets.  I have encouraged nursing to get the patient out of bed and in a 

chair.  She is lost a lot of ground.  Plan is for her to be discharged home and 

want her to be able to stand and pivot and walk 1 step





(5) Acute kidney injury resolved. 


Labs were all reviewed.  She normally runs a creatinine of 0.8.  With her 

history of 4 days of poor oral intake, she is dehydrated and this caused her 

BEN.  Her BUN/creatinine on admission were 41/2.3. Not only was her lisinopril 

stopped, she was started on IV fluids and >> 34/1.6>>18/0.9>>13/0.7 today. 


Plan:


Avoid nephrotoxins


Continue with IV fluids


Follow BMP daily





(6) Liver masses


Her CT scan then the ultrasound done  showed "innumerable" masses in the liver. 

 There is one particularly large mass measuring 6.2 cm in the right posterior 

lobe.


The ED provider was the first 1 to tell the patient and  that she 

probably has cancer. We had planned to get tissue sample for pathology.  

Conversation was held with radiology on May 8 and we needed to normalize her 

INR.  On May 8 she received vitamin K and FFP.  In spite of that, her INR is 

still over 4 today.  Today's radiologist wanted better imaging with contrast and

 a repeat CT was done.  Not only does she have the same liver masses, she 

appears to have nodules in the paracolic gutter indicating carcinomatosis.  

Lymphadenopathy.  She is still not ready for biopsy because of INR.  I am also 

noting that she has changes of inflammation on her gallbladder.  Could you have 

passed a stone?  Could she have primary hepatobiliary cancer that has 

metastasized.





In speaking to radiology I repeated the CT of abdomen and used contrast 5/9,  

and the repeat CAT scan has been helpful so he does not have to worry about a 

hemangioma.





I also spoke to oncology in the medical ambulatory clinic 5/9.  I presented the 

case to the oncologist.  Her hypotension and possible sepsis is definitely 

concerning.  But they feel that if we can get her through this temporary phase 

of illness, and have a tissue diagnosis, there are therapies that could allow 

her a decent quality of life in an extended life of a few years.  They feel it 

is worth getting a liver biopsy and moving forward with at least getting a 

tissue diagnosis.





I also spoke to the patient about CODE STATUS on 5/9.  She really did understand

 the possibility that she could have a terminal illness and she was 

understandably tearful and grief stricken.  This is all happening so fast.  I 

discussed what the resuscitative effort would mean with regards to CPR and 

intubation.  She states that she does not want that.  While she is willing to go

 to do treatment, get a biopsy, and see how she does, she feels that a full on 

resuscitative measures with CPR and intubation would not be what she wants.  

"What would be the point" is her statement. So CODE STATUS was changed to DO NOT

 RESUSCITATE.





Some of the tumor markers have already come back.  Alpha-fetoprotein is 1.8.  

Normal levels.  CA 15-3 is 623.  High.  CA 19-9 antigen is pending.  Ca1 25 

antigen is 14 and within normal range.  CEA is 1633.5. I started Decadron 

yesterday.  Some oncology literature suggest that pain is relieved by the anti-

inflammatory effects of the steroids.





Plan:


Tumor markers as already discussed above





She has a POLST form at the bedside that she would like me to fill out with her.

 I was not able to do that yesterday. She is asked that her  and daughter

 Juhi be the points of contact for the POLST form





By May 15, if she is still here, and off of the Levophed, I would request a 

liver biopsy before d





5/12/23-anticipate liver biopsy per IR





(8) Transaminitis


The family was all in the room and reported that the patient gets yearly 

thorough blood tests.  These were done in October 2022 and the LFTs were normal 

then ( I reviewed all labs in this EMR).


I questioned her about alcohol use and she hardly uses any.  The  and 

other family members concur. In following her liver function studies daily, they

 are improving.  If she truly had LFTs that were elevated because of tumor, the 

LFTs would not be getting better.  Since they are getting better it makes me 

wonder if she has a transitory process such as cholecystitis or a passed stone 

giving her the right upper quadrant pain that so severe today. I reiterated to 

them that her liver function studies could be normal in spite of having tumor in

 the liver.  I really do not think that something was missed in October on the 

basis of labs.


Plan:


Follow LFTs daily


Avoid hepatotoxins


Work-up of the liver mets as described in #6 above








(8) DM II


The patient takes metformin.  The  admitted she does not really follow a 

diabetic diet. Our treatment consist of ACHS fingerstick checks and sliding 

scale insulin coverage for this. A1c is 7.1%.  


5/8 her glucose was 96, 124, 154, 131.


5/9 her glucose was 108, 139. , 112, 139


May 10: 132, 143, 200


Today 163, 240 (I did start her on Decadron yesterday)


 She is eating 0 to 75% of her food. She says that she appreciates her diet 

being changed to regular diet on May 9.  She really did not like the dysphagia 

pured diet.


Plan:


Continue with ACHS fingerstick checks and sliding scale insulin coverage. 

Advance coverage tomorrow if her glucose is consistently above 200





(9) Chronic pain


I asked the patient and  why she is on methadone.  There is a history of 

previously needing to be on oxycodone at very high doses which was then 

transitioned to methadone.  Her chronic pain was in her neck and lower back. 


5/9 her right upper quadrant pain is so severe that she is requiring doses of 

morphine IV that are not holding her.  It is not her diffuse pain that is 

bothering her.


I stopped the methadone and started her on a PCA pump. Her pain is better 

controlled with the PCA pump.


5/11:  Part of the reason I requested a palliative care consult was discharge 

transitioning this patient to oral meds to control her pain.  Consult will be 

dictated later today and she will be making recommendations.





Plan:


PCA pump as noted above





5/12/23-patient will be transferred off of PCA and administer pain meds as 

recommended by palliative care





(10) Hypothyroidism


Her TSH is elevated but free T4 is normal.


Plan:


Continue with her same home thyroid replacement dose





(11) Hx of CVA


Patient has had 3 strokes, 2003, 2013 and 2014.  The chart indicates she has a 

"intracardiac shunt". She has chronic weakness on one side.  She uses walk


She is on chronic warfarin since those strokes


Plan:


Eventually she will need PT and OT evaluations.





(12)  BOBBI





Plan: I have reiterated to her family that her home machine needs to be brought 

in here.  Especially in view of the fact of the morphine use.











Subjective: Patient's mental status is slightly improved and is slightly doing 

better with pain control





Objective: Vital signs and nursing notes reviewed


Head: Normocephalic atraumatic


General: Ill-appearing


Mouth: No lesions


Neck: Supple


Chest: Clear to auscultation


Cor: Regular rate and rhythm S1-S2


Abdomen: Soft tenderness in the right upper quadrant no rebound no guarding


Extremities: Trace bilateral pedal edema


Skin: Slightly decreased turgor


Psych: Mood and affect are appropriate but slightly blunted affect


Neuro: Alert and oriented x3, motor strength intact bilaterally

## 2023-05-14 LAB
ANION GAP SERPL CALCULATED.4IONS-SCNC: 8 MMOL/L (ref 6–13)
BASOPHILS # BLD MANUAL: 0 10^3/UL (ref 0–0.1)
BASOPHILS NFR BLD AUTO: 0.2 %
BUN SERPL-MCNC: 19 MG/DL (ref 6–20)
CALCIUM UR-MCNC: 8.1 MG/DL (ref 8.5–10.3)
CHLORIDE SERPL-SCNC: 99 MMOL/L (ref 101–111)
CO2 SERPL-SCNC: 37 MMOL/L (ref 21–32)
CREAT SERPLBLD-SCNC: 0.7 MG/DL (ref 0.4–1)
EOSINOPHIL # BLD MANUAL: 0 10^3/UL (ref 0–0.7)
EOSINOPHIL NFR BLD AUTO: 0.2 %
ERYTHROCYTE [DISTWIDTH] IN BLOOD BY AUTOMATED COUNT: 16.1 % (ref 12–15)
GFRSERPLBLD MDRD-ARVRAT: 82 ML/MIN/{1.73_M2} (ref 89–?)
GLUCOSE SERPL-MCNC: 129 MG/DL (ref 70–100)
HCT VFR BLD AUTO: 33.6 % (ref 37–47)
HGB UR QL STRIP: 10.5 G/DL (ref 12–16)
INR PPP: 1.7 (ref 0.8–1.2)
LYMPH ABN NFR BLD MANUAL: 0 %
LYMPHOBLASTS # BLD: 7 %
LYMPHOCYTES # BLD MANUAL: 0.8 10^3/UL (ref 1.5–3.5)
LYMPHOCYTES NFR BLD AUTO: 12.7 %
MANUAL DIF COMMENT BLD-IMP: (no result)
MCH RBC QN AUTO: 30.2 PG (ref 27–31)
MCHC RBC AUTO-ENTMCNC: 31.3 G/DL (ref 32–36)
MCV RBC AUTO: 96.6 FL (ref 81–99)
METAMYELOCYTES NFR BLD: 2 %
MONOCYTES # BLD MANUAL: 1.7 10^3/UL (ref 0–1)
MONOCYTES NFR BLD AUTO: 12.8 %
NEUTROPHILS # SNV AUTO: 12 X10^3/UL (ref 4.8–10.8)
NEUTROPHILS NFR BLD AUTO: 69.8 %
NEUTROPHILS NFR BLD MANUAL: 9.2 10^3/UL (ref 1.5–6.6)
NEUTS BAND NFR BLD: 1 %
PDW BLD AUTO: 11.6 FL (ref 7.9–10.8)
PLAT MORPH BLD: (no result)
PLATELET # BLD: 178 10^3/UL (ref 130–450)
PLATELET BLD QL SMEAR: (no result)
POTASSIUM SERPL-SCNC: 3.8 MMOL/L (ref 3.5–5)
PROTHROM ACT/NOR PPP: 18.8 SECS (ref 9.9–12.6)
RBC MAR: 3.48 10^6/UL (ref 4.2–5.4)
RBC MORPH BLD: (no result)
SODIUM SERPLBLD-SCNC: 144 MMOL/L (ref 135–145)
WBC MORPH BLD: (no result)

## 2023-05-14 RX ADMIN — SODIUM CHLORIDE, PRESERVATIVE FREE PRN ML: 5 INJECTION INTRAVENOUS at 04:47

## 2023-05-14 RX ADMIN — SODIUM CHLORIDE, PRESERVATIVE FREE SCH ML: 5 INJECTION INTRAVENOUS at 08:56

## 2023-05-14 RX ADMIN — MORPHINE SULFATE SCH MG: 30 TABLET, FILM COATED, EXTENDED RELEASE ORAL at 21:10

## 2023-05-14 RX ADMIN — INSULIN LISPRO SCH: 100 INJECTION, SOLUTION INTRAVENOUS; SUBCUTANEOUS at 08:41

## 2023-05-14 RX ADMIN — INSULIN LISPRO SCH UNIT: 100 INJECTION, SOLUTION INTRAVENOUS; SUBCUTANEOUS at 12:16

## 2023-05-14 RX ADMIN — DIMETHICONE PRN APPLIC: 13000 CREAM TOPICAL at 06:07

## 2023-05-14 RX ADMIN — MORPHINE SULFATE PRN MG: 15 TABLET ORAL at 17:57

## 2023-05-14 RX ADMIN — MIDODRINE HYDROCHLORIDE SCH MG: 2.5 TABLET ORAL at 16:55

## 2023-05-14 RX ADMIN — INSULIN LISPRO SCH UNIT: 100 INJECTION, SOLUTION INTRAVENOUS; SUBCUTANEOUS at 16:56

## 2023-05-14 RX ADMIN — MORPHINE SULFATE SCH MG: 30 TABLET, FILM COATED, EXTENDED RELEASE ORAL at 08:56

## 2023-05-14 RX ADMIN — NYSTATIN SCH APPLIC: 100000 POWDER TOPICAL at 08:56

## 2023-05-14 RX ADMIN — Medication SCH MG: at 08:32

## 2023-05-14 RX ADMIN — Medication SCH TAB: at 08:33

## 2023-05-14 RX ADMIN — MIDODRINE HYDROCHLORIDE SCH MG: 2.5 TABLET ORAL at 08:34

## 2023-05-14 RX ADMIN — FAMOTIDINE SCH MG: 20 TABLET, FILM COATED ORAL at 08:32

## 2023-05-14 RX ADMIN — SODIUM CHLORIDE, PRESERVATIVE FREE SCH ML: 5 INJECTION INTRAVENOUS at 16:56

## 2023-05-14 RX ADMIN — FAMOTIDINE SCH MG: 20 TABLET, FILM COATED ORAL at 21:10

## 2023-05-14 RX ADMIN — SODIUM CHLORIDE, PRESERVATIVE FREE SCH ML: 5 INJECTION INTRAVENOUS at 04:47

## 2023-05-14 RX ADMIN — DOCUSATE SODIUM SCH MG: 250 CAPSULE, LIQUID FILLED ORAL at 08:36

## 2023-05-14 RX ADMIN — MIDODRINE HYDROCHLORIDE SCH MG: 2.5 TABLET ORAL at 12:16

## 2023-05-14 RX ADMIN — MORPHINE SULFATE PRN MG: 15 TABLET ORAL at 04:58

## 2023-05-14 RX ADMIN — ONDANSETRON PRN MG: 2 INJECTION INTRAMUSCULAR; INTRAVENOUS at 10:18

## 2023-05-14 RX ADMIN — PREGABALIN SCH MG: 100 CAPSULE ORAL at 08:36

## 2023-05-14 RX ADMIN — LEVOTHYROXINE SODIUM SCH MCG: 75 TABLET ORAL at 05:55

## 2023-05-14 RX ADMIN — INSULIN LISPRO SCH UNIT: 100 INJECTION, SOLUTION INTRAVENOUS; SUBCUTANEOUS at 21:11

## 2023-05-14 RX ADMIN — NYSTATIN SCH APPLIC: 100000 POWDER TOPICAL at 21:14

## 2023-05-14 RX ADMIN — Medication SCH MCG: at 08:36

## 2023-05-14 RX ADMIN — PREGABALIN SCH MG: 100 CAPSULE ORAL at 21:10

## 2023-05-14 NOTE — PROVIDER PROGRESS NOTE
Progress Note





Progress Note








Assessment and Plan


This is DOL# [ ], HD# [ ] for MACHO JOHNSON born via  at [birth year] 20:12 to a 

yo G  now P [] at  wk EGA.











(1) Hypotension


Impression: 





She was transferred to the ICU May 9 for her hypotension.  In this unfortunate 

female, hypotension could be from the disease process in her liver.  CT scan 

shows extensive, multiple, masses in the liver.  A left paracolic gutter nodule,

 and lymphadenopathy.  She does not appear to have signs and symptoms of sepsis 

other than hypotension and no objective source on CT, CXR or UA.   There is no 

pulmonary, GI, or  findings other than pericholecystic inflammation on CT done

 on admission.  She is tender in the right upper quadrant.  She is not having an

 MI.  She does not have a GI bleed but could be bleeding in her liver due to 

mets and an elevated INR (but no tachycardia on vitals).  She is not 

intravascularly depleted with dehydration





Treatment consisted of Levophed to keep mean arterial pressure at 65. The repeat

 abdomen pelvis CT done yesterday for the intractable right upper quadrant pain 

has no evidence of obstruction.  She continues to have left paracolic gutter 

lesion, a liver that is filled with innumerable masses.  She has nonspecific 

pericholecystic fluid and wall thickening.  The biliary tree and pancreatic duct

 are prominent without definite pathologic dilation or obstruction.  Possible 

small splenic cyst.  Pathologic appearing lymph nodes.  Pelvis has a small 

amount of free fluid.  Elias is in place with an under distended bladder.  She 

had a hysterectomy.





Since infection could not be completely ruled out,  I started her on broad-

spectrum antibiotics with cefepime, vancomycin, and Flagyl.  Today is day #3.





She did have blood cultures on May 7.  Those were positive today with gram-

positive cocci.  PCR has staph species.  Since they became positive 3 days after

 they were done, I suspect this is contamination. I also ordered blood cultures 

on May 9.  Those blood cultures have had no growth after 24 hours.





Levophed continues at 2 mcg.  Blood pressure is 136 systolic at times.  Urine 

output is maintained.





Plan:


Stop empiric antibiotics and monitor for signs and symptoms of infection


I Reduced Levophed to 1 mcg and see how she does.  I have again encouraged 

nursing to see if we can get her off the Levophed.


I have asked nursing to please get this patient out of bed and into a chair.  

And I have asked physical therapy to start working with her.





5/12/2023 - Patient is currently off of IV Levophed and is having adequate blood

 pressure support today





(2) Supratherapeutic INR


Impression: 


When the patient was admitted she was admitted with an INR of greater than 10.  

She takes medication for atrial fibrillation which includes Coumadin.  She was 

taking her medication in spite of not having any p.o. intake for 4 days.  

Between the liver being replaced by cancer, dehydration, and taking her 

Coumadin, she was supratherapeutic.  She received FFP on admission.  On the 

second day her INR was 4.7 and she received vitamin K.  the third day, her INR 

was 4.3 and she received more vitamin K.  We are attempting to get her down to 

less than 1.5.  She needs this for radiology for liver biopsy.





On the 4th day, INR was 1.9.  Radiology had cancelled her biopsy and has asked 

that I treat her again for the INR.  Family is understandably worried and upset.

  They really want this biopsy to see what the diagnosis is going to be to them 

let oncology know what treatment would be.





I ordered fresh frozen plasma.  But we ended up not using it.  Radiology 

reviewed the chart and felt that since the patient was on Levophed, 

"hemodynamically unstable", she was not a good candidate for liver biopsy due to

 the risk of bleeding.  As such the biopsy was canceled.  Radiology was able to 

come and speak to the family and explained their thought process to the family.





5/12/23 -


-Awaiting INR to be 1.5 per interventional radiology in order to do biopsy





(3) RUQ abdominal pain


Impression: 


This unfortunate female has already had imaging studies that tell us that she 

has multiple, multiple masses to her liver.  The presumptive thought is that she

 has metastatic disease of unknown primary.  There is nodularity along the 

capsule as well as the parenchyma.  Gallbladder is unremarkable.  No 

hydronephrosis.  This was seen on the admission abdomen/pelvis CT May 7.  That 

was done without contrast.  Abdominal ultrasound also shows innumerable 

hypoechoic mass lesions.  A representative large complex mass in the posterior 

right hepatic lobe measures 6.2 cm.  The intrahepatic ducts appear nondilated.  

There is dilation of the extrahepatic duct with the common bile duct 1 cm (think

 primary hepatobiliary w mets or a passed stone).  Her total bili was 1.7 on 

admission.  It is 1.1 today.  AST was 206 on admission and is 66 today.  ALT was

 74 on admission and is now 45 today.  Alk phos was 311 and is 220 today.  

Ammonia level is 15.3.





5/9 The radiologist and I discussed the previous CT.  And our plans moving 

forward.  Our obvious suspicion is that she has cancer, either hepatobiliary of 

mets.  Or unknown primary with mets.  I am hoping for a pathologic piece of 

tissue to give us the diagnosis. 





I was worried that she may be bleeding in her liver causing such new intractable

 pain.  After I reviewed her liver function studies, I also thought she could be

 having gallbladder symptoms even though she did not have stones on the 

admission CT.  If her elevated liver function studies were from tumor, they 

should not be going down.  And they are going down. Radiologist is worried that 

they do not have a contrasted CAT scan to let him know if they will be getting 

into trouble with an angioma.





So I ordered a multiphase CT of the abdomen 5/9.  There is no hemangioma.  No 

bleeding under the capsule. The liver masses were much better delineated for the

 radiologist.  We agreed that as long as I can get her INR below 2 she could go 

to biopsy on 5/10. 





Then I reviewed the radiology literature with 5/10 radiologist.  Their 

guidelines state that the INR needs to be below 1.5.  See problem #2.





Her pain is better with the morphine PCA pump.  I have ordered a 1 mg/h 

continuous infusion rate.  I am allowing her 1 mg every 10 minutes with a max of

 8 mg in 4 hours and I ordered a Palliative Care consult. 





Plan:


Continue above PCA pump


Palliative care has seen the patient today.  She was seen in the late afternoon 

and they have not had time to dictate their note.  They predict that this 

patient will most likely need to be discharged on long-acting opioids.  They 

will review how much morphine she has had delivered over the last 2 days and 

calculate an appropriate conversion dose and let me know.





5/12/23 - Appreciate palliative care consult.  Will change pain medication 

regimen per palliative care recommendation.Anticipate IR obtained liver biopsy.





(4) Altered mental status resolved


Impression: 


 By May 9 she was alert enough to process the information I was telling her and 

be appropriately grief stricken and fearful about the cancer diagnosis.  She was

 able to tell me what she was feeling, able to describe her pain well.  ,

 who was at the bedside yesterday, endorsed that she was the best she has been 

since being in the hospital.





She continues to be fatigued, weak, but is oriented to person place and time.





Plan:


Most likely her altered mental status was from the dehydration and the pain of 

liver mets.  I have encouraged nursing to get the patient out of bed and in a 

chair.  She is lost a lot of ground.  Plan is for her to be discharged home and 

want her to be able to stand and pivot and walk 1 step





(5) Acute kidney injury resolved. 


Labs were all reviewed.  She normally runs a creatinine of 0.8.  With her 

history of 4 days of poor oral intake, she is dehydrated and this caused her 

BEN.  Her BUN/creatinine on admission were 41/2.3. Not only was her lisinopril 

stopped, she was started on IV fluids and >> 34/1.6>>18/0.9>>13/0.7 today. 


Plan:


Avoid nephrotoxins


Continue with IV fluids


Follow BMP daily





(6) Liver masses


Her CT scan then the ultrasound done  showed "innumerable" masses in the liver. 

 There is one particularly large mass measuring 6.2 cm in the right posterior 

lobe.


The ED provider was the first 1 to tell the patient and  that she 

probably has cancer. We had planned to get tissue sample for pathology.  

Conversation was held with radiology on May 8 and we needed to normalize her 

INR.  On May 8 she received vitamin K and FFP.  In spite of that, her INR is 

still over 4 today.  Today's radiologist wanted better imaging with contrast and

 a repeat CT was done.  Not only does she have the same liver masses, she 

appears to have nodules in the paracolic gutter indicating carcinomatosis.  

Lymphadenopathy.  She is still not ready for biopsy because of INR.  I am also 

noting that she has changes of inflammation on her gallbladder.  Could you have 

passed a stone?  Could she have primary hepatobiliary cancer that has 

metastasized.





In speaking to radiology I repeated the CT of abdomen and used contrast 5/9,  

and the repeat CAT scan has been helpful so he does not have to worry about a 

hemangioma.





I also spoke to oncology in the medical ambulatory clinic 5/9.  I presented the 

case to the oncologist.  Her hypotension and possible sepsis is definitely 

concerning.  But they feel that if we can get her through this temporary phase 

of illness, and have a tissue diagnosis, there are therapies that could allow 

her a decent quality of life in an extended life of a few years.  They feel it 

is worth getting a liver biopsy and moving forward with at least getting a 

tissue diagnosis.





I also spoke to the patient about CODE STATUS on 5/9.  She really did understand

 the possibility that she could have a terminal illness and she was 

understandably tearful and grief stricken.  This is all happening so fast.  I 

discussed what the resuscitative effort would mean with regards to CPR and 

intubation.  She states that she does not want that.  While she is willing to go

 to do treatment, get a biopsy, and see how she does, she feels that a full on 

resuscitative measures with CPR and intubation would not be what she wants.  

"What would be the point" is her statement. So CODE STATUS was changed to DO NOT

 RESUSCITATE.





Some of the tumor markers have already come back.  Alpha-fetoprotein is 1.8.  

Normal levels.  CA 15-3 is 623.  High.  CA 19-9 antigen is pending.  Ca1 25 

antigen is 14 and within normal range.  CEA is 1633.5. I started Decadron 

yesterday.  Some oncology literature suggest that pain is relieved by the anti-

inflammatory effects of the steroids.





Plan:


Tumor markers as already discussed above





She has a POLST form at the bedside that she would like me to fill out with her.

 I was not able to do that yesterday. She is asked that her  and daughter

 Juhi be the points of contact for the POLST form





By May 15, if she is still here, and off of the Levophed, I would request a 

liver biopsy before d





5/12/23-anticipate liver biopsy per IR





5/14/23- stable. Await IR biopsy of liver. ? tomorrow.INR=1.7 today and given 

Vit K 10 mg





(8) Transaminitis


The family was all in the room and reported that the patient gets yearly 

thorough blood tests.  These were done in October 2022 and the LFTs were normal 

then ( I reviewed all labs in this EMR).


I questioned her about alcohol use and she hardly uses any.  The  and 

other family members concur. In following her liver function studies daily, they

 are improving.  If she truly had LFTs that were elevated because of tumor, the 

LFTs would not be getting better.  Since they are getting better it makes me 

wonder if she has a transitory process such as cholecystitis or a passed stone 

giving her the right upper quadrant pain that so severe today. I reiterated to 

them that her liver function studies could be normal in spite of having tumor in

 the liver.  I really do not think that something was missed in October on the 

basis of labs.


Plan:


Follow LFTs daily


Avoid hepatotoxins


Work-up of the liver mets as described in #6 above








(8) DM II


The patient takes metformin.  The  admitted she does not really follow a 

diabetic diet. Our treatment consist of ACHS fingerstick checks and sliding 

scale insulin coverage for this. A1c is 7.1%.  


5/8 her glucose was 96, 124, 154, 131.


5/9 her glucose was 108, 139. , 112, 139


May 10: 132, 143, 200


Today 163, 240 (I did start her on Decadron yesterday)


 She is eating 0 to 75% of her food. She says that she appreciates her diet 

being changed to regular diet on May 9.  She really did not like the dysphagia 

pured diet.


Plan:


Continue with ACHS fingerstick checks and sliding scale insulin coverage. 

Advance coverage tomorrow if her glucose is consistently above 200





(9) Chronic pain


I asked the patient and  why she is on methadone.  There is a history of 

previously needing to be on oxycodone at very high doses which was then 

transitioned to methadone.  Her chronic pain was in her neck and lower back. 


5/9 her right upper quadrant pain is so severe that she is requiring doses of 

morphine IV that are not holding her.  It is not her diffuse pain that is 

bothering her.


I stopped the methadone and started her on a PCA pump. Her pain is better 

controlled with the PCA pump.


5/11:  Part of the reason I requested a palliative care consult was discharge 

transitioning this patient to oral meds to control her pain.  Consult will be 

dictated later today and she will be making recommendations.





Plan:


PCA pump as noted above





5/12/23-patient will be transferred off of PCA and administer pain meds as 

recommended by palliative care





(10) Hypothyroidism


Her TSH is elevated but free T4 is normal.


Plan:


Continue with her same home thyroid replacement dose





(11) Hx of CVA


Patient has had 3 strokes, 2003, 2013 and 2014.  The chart indicates she has a 

"intracardiac shunt". She has chronic weakness on one side.  She uses walk


She is on chronic warfarin since those strokes


Plan:


Eventually she will need PT and OT evaluations.





(12)  BOBBI





Plan: I have reiterated to her family that her home machine needs to be brought 

in here.  Especially in view of the fact of the morphine use.











Subjective: Patient's mental status is slightly improved and is slightly doing 

better with pain control





Objective: Vital signs and nursing notes reviewed


Head: Normocephalic atraumatic


General: Ill-appearing


Mouth: No lesions


Neck: Supple


Chest: Clear to auscultation


Cor: Regular rate and rhythm S1-S2


Abdomen: Soft tenderness in the right upper quadrant no rebound no guarding


Extremities: Trace bilateral pedal edema


Skin: Slightly decreased turgor


Psych: Mood and affect are appropriate but slightly blunted affect


Neuro: Alert and oriented x3, motor strength intact bilaterally

## 2023-05-15 LAB
ANION GAP SERPL CALCULATED.4IONS-SCNC: 8 MMOL/L (ref 6–13)
BASOPHILS NFR BLD AUTO: 0 10^3/UL (ref 0–0.1)
BASOPHILS NFR BLD AUTO: 0.3 %
BUN SERPL-MCNC: 21 MG/DL (ref 6–20)
CALCIUM UR-MCNC: 8.2 MG/DL (ref 8.5–10.3)
CHLORIDE SERPL-SCNC: 98 MMOL/L (ref 101–111)
CO2 SERPL-SCNC: 37 MMOL/L (ref 21–32)
CREAT SERPLBLD-SCNC: 0.7 MG/DL (ref 0.4–1)
EOSINOPHIL # BLD AUTO: 0.1 10^3/UL (ref 0–0.7)
EOSINOPHIL NFR BLD AUTO: 0.5 %
ERYTHROCYTE [DISTWIDTH] IN BLOOD BY AUTOMATED COUNT: 16.5 % (ref 12–15)
GFRSERPLBLD MDRD-ARVRAT: 82 ML/MIN/{1.73_M2} (ref 89–?)
GLUCOSE SERPL-MCNC: 139 MG/DL (ref 70–100)
HCT VFR BLD AUTO: 35.1 % (ref 37–47)
HGB UR QL STRIP: 10.6 G/DL (ref 12–16)
INR PPP: 1.4 (ref 0.8–1.2)
LYMPHOCYTES # SPEC AUTO: 1.5 10^3/UL (ref 1.5–3.5)
LYMPHOCYTES NFR BLD AUTO: 11.1 %
MCH RBC QN AUTO: 29.7 PG (ref 27–31)
MCHC RBC AUTO-ENTMCNC: 30.2 G/DL (ref 32–36)
MCV RBC AUTO: 98.3 FL (ref 81–99)
MONOCYTES # BLD AUTO: 1.4 10^3/UL (ref 0–1)
MONOCYTES NFR BLD AUTO: 10.8 %
NEUTROPHILS # BLD AUTO: 9.7 10^3/UL (ref 1.5–6.6)
NEUTROPHILS # SNV AUTO: 13.1 X10^3/UL (ref 4.8–10.8)
NEUTROPHILS NFR BLD AUTO: 73.8 %
NRBC # BLD AUTO: 0.07 X10^3/UL
NRBC # BLD AUTO: 0.5 /100WBC
PDW BLD AUTO: 11.5 FL (ref 7.9–10.8)
PLATELET # BLD: 135 10^3/UL (ref 130–450)
POTASSIUM SERPL-SCNC: 3.9 MMOL/L (ref 3.5–5)
PROTHROM ACT/NOR PPP: 15.1 SECS (ref 9.9–12.6)
RBC MAR: 3.57 10^6/UL (ref 4.2–5.4)
SODIUM SERPLBLD-SCNC: 143 MMOL/L (ref 135–145)

## 2023-05-15 RX ADMIN — LEVOTHYROXINE SODIUM SCH MCG: 75 TABLET ORAL at 05:36

## 2023-05-15 RX ADMIN — PREGABALIN SCH MG: 100 CAPSULE ORAL at 09:22

## 2023-05-15 RX ADMIN — NYSTATIN SCH APPLIC: 100000 POWDER TOPICAL at 21:20

## 2023-05-15 RX ADMIN — MIDODRINE HYDROCHLORIDE SCH MG: 2.5 TABLET ORAL at 12:15

## 2023-05-15 RX ADMIN — DOCUSATE SODIUM SCH MG: 250 CAPSULE, LIQUID FILLED ORAL at 09:22

## 2023-05-15 RX ADMIN — MIDODRINE HYDROCHLORIDE SCH MG: 2.5 TABLET ORAL at 17:22

## 2023-05-15 RX ADMIN — SODIUM CHLORIDE, PRESERVATIVE FREE PRN ML: 5 INJECTION INTRAVENOUS at 11:08

## 2023-05-15 RX ADMIN — MORPHINE SULFATE SCH MG: 30 TABLET, FILM COATED, EXTENDED RELEASE ORAL at 09:21

## 2023-05-15 RX ADMIN — MORPHINE SULFATE SCH MG: 30 TABLET, FILM COATED, EXTENDED RELEASE ORAL at 21:11

## 2023-05-15 RX ADMIN — SODIUM CHLORIDE, PRESERVATIVE FREE SCH ML: 5 INJECTION INTRAVENOUS at 17:22

## 2023-05-15 RX ADMIN — INSULIN LISPRO SCH UNIT: 100 INJECTION, SOLUTION INTRAVENOUS; SUBCUTANEOUS at 12:16

## 2023-05-15 RX ADMIN — FAMOTIDINE SCH MG: 20 TABLET, FILM COATED ORAL at 21:12

## 2023-05-15 RX ADMIN — INSULIN LISPRO SCH: 100 INJECTION, SOLUTION INTRAVENOUS; SUBCUTANEOUS at 09:22

## 2023-05-15 RX ADMIN — MORPHINE SULFATE PRN MG: 15 TABLET ORAL at 16:40

## 2023-05-15 RX ADMIN — Medication SCH MG: at 09:21

## 2023-05-15 RX ADMIN — SODIUM CHLORIDE, PRESERVATIVE FREE SCH ML: 5 INJECTION INTRAVENOUS at 09:23

## 2023-05-15 RX ADMIN — MORPHINE SULFATE PRN MG: 15 TABLET ORAL at 03:53

## 2023-05-15 RX ADMIN — INSULIN LISPRO SCH UNIT: 100 INJECTION, SOLUTION INTRAVENOUS; SUBCUTANEOUS at 17:21

## 2023-05-15 RX ADMIN — Medication SCH TAB: at 09:53

## 2023-05-15 RX ADMIN — ONDANSETRON PRN MG: 2 INJECTION INTRAMUSCULAR; INTRAVENOUS at 11:07

## 2023-05-15 RX ADMIN — MIDODRINE HYDROCHLORIDE SCH MG: 2.5 TABLET ORAL at 09:22

## 2023-05-15 RX ADMIN — FAMOTIDINE SCH MG: 20 TABLET, FILM COATED ORAL at 09:21

## 2023-05-15 RX ADMIN — NYSTATIN SCH APPLIC: 100000 POWDER TOPICAL at 09:25

## 2023-05-15 RX ADMIN — PREGABALIN SCH MG: 100 CAPSULE ORAL at 21:13

## 2023-05-15 RX ADMIN — Medication SCH MCG: at 09:22

## 2023-05-15 RX ADMIN — SODIUM CHLORIDE, PRESERVATIVE FREE SCH ML: 5 INJECTION INTRAVENOUS at 02:44

## 2023-05-15 RX ADMIN — INSULIN LISPRO SCH UNIT: 100 INJECTION, SOLUTION INTRAVENOUS; SUBCUTANEOUS at 21:13

## 2023-05-15 NOTE — PROVIDER PROGRESS NOTE
Progress Note











Assessment and Plan


This is DOL# [ ], HD# [ ] for MACHO JOHNSON born via  at [birth year] 20:12 to a 

yo G  now P [] at  wk EGA.











(1) Hypotension


Impression: 





She was transferred to the ICU May 9 for her hypotension.  In this unfortunate 

female, hypotension could be from the disease process in her liver.  CT scan 

shows extensive, multiple, masses in the liver.  A left paracolic gutter nodule,

 and lymphadenopathy.  She does not appear to have signs and symptoms of sepsis 

other than hypotension and no objective source on CT, CXR or UA.   There is no 

pulmonary, GI, or  findings other than pericholecystic inflammation on CT done

 on admission.  She is tender in the right upper quadrant.  She is not having an

 MI.  She does not have a GI bleed but could be bleeding in her liver due to 

mets and an elevated INR (but no tachycardia on vitals).  She is not 

intravascularly depleted with dehydration





Treatment consisted of Levophed to keep mean arterial pressure at 65. The repeat

 abdomen pelvis CT done yesterday for the intractable right upper quadrant pain 

has no evidence of obstruction.  She continues to have left paracolic gutter 

lesion, a liver that is filled with innumerable masses.  She has nonspecific 

pericholecystic fluid and wall thickening.  The biliary tree and pancreatic duct

 are prominent without definite pathologic dilation or obstruction.  Possible 

small splenic cyst.  Pathologic appearing lymph nodes.  Pelvis has a small 

amount of free fluid.  Elias is in place with an under distended bladder.  She 

had a hysterectomy.





Since infection could not be completely ruled out,  I started her on broad-

spectrum antibiotics with cefepime, vancomycin, and Flagyl.  Today is day #3.





She did have blood cultures on May 7.  Those were positive today with gram-

positive cocci.  PCR has staph species.  Since they became positive 3 days after

 they were done, I suspect this is contamination. I also ordered blood cultures 

on May 9.  Those blood cultures have had no growth after 24 hours.





Levophed continues at 2 mcg.  Blood pressure is 136 systolic at times.  Urine 

output is maintained.





Plan:


Stop empiric antibiotics and monitor for signs and symptoms of infection


I Reduced Levophed to 1 mcg and see how she does.  I have again encouraged 

nursing to see if we can get her off the Levophed.


I have asked nursing to please get this patient out of bed and into a chair.  

And I have asked physical therapy to start working with her.





5/12/2023 - Patient is currently off of IV Levophed and is having adequate blood

 pressure support today





(2) Supratherapeutic INR


Impression: 


When the patient was admitted she was admitted with an INR of greater than 10.  

She takes medication for atrial fibrillation which includes Coumadin.  She was 

taking her medication in spite of not having any p.o. intake for 4 days.  

Between the liver being replaced by cancer, dehydration, and taking her 

Coumadin, she was supratherapeutic.  She received FFP on admission.  On the 

second day her INR was 4.7 and she received vitamin K.  the third day, her INR 

was 4.3 and she received more vitamin K.  We are attempting to get her down to 

less than 1.5.  She needs this for radiology for liver biopsy.





On the 4th day, INR was 1.9.  Radiology had cancelled her biopsy and has asked 

that I treat her again for the INR.  Family is understandably worried and upset.

  They really want this biopsy to see what the diagnosis is going to be to them 

let oncology know what treatment would be.





I ordered fresh frozen plasma.  But we ended up not using it.  Radiology 

reviewed the chart and felt that since the patient was on Levophed, 

"hemodynamically unstable", she was not a good candidate for liver biopsy due to

 the risk of bleeding.  As such the biopsy was canceled.  Radiology was able to 

come and speak to the family and explained their thought process to the family.





5/12/23 -


-Awaiting INR to be 1.5 per interventional radiology in order to do biopsy





(3) RUQ abdominal pain


Impression: 


This unfortunate female has already had imaging studies that tell us that she 

has multiple, multiple masses to her liver.  The presumptive thought is that she

 has metastatic disease of unknown primary.  There is nodularity along the 

capsule as well as the parenchyma.  Gallbladder is unremarkable.  No 

hydronephrosis.  This was seen on the admission abdomen/pelvis CT May 7.  That 

was done without contrast.  Abdominal ultrasound also shows innumerable 

hypoechoic mass lesions.  A representative large complex mass in the posterior 

right hepatic lobe measures 6.2 cm.  The intrahepatic ducts appear nondilated.  

There is dilation of the extrahepatic duct with the common bile duct 1 cm (think

 primary hepatobiliary w mets or a passed stone).  Her total bili was 1.7 on 

admission.  It is 1.1 today.  AST was 206 on admission and is 66 today.  ALT was

 74 on admission and is now 45 today.  Alk phos was 311 and is 220 today.  

Ammonia level is 15.3.





5/9 The radiologist and I discussed the previous CT.  And our plans moving 

forward.  Our obvious suspicion is that she has cancer, either hepatobiliary of 

mets.  Or unknown primary with mets.  I am hoping for a pathologic piece of 

tissue to give us the diagnosis. 





I was worried that she may be bleeding in her liver causing such new intractable

 pain.  After I reviewed her liver function studies, I also thought she could be

 having gallbladder symptoms even though she did not have stones on the 

admission CT.  If her elevated liver function studies were from tumor, they 

should not be going down.  And they are going down. Radiologist is worried that 

they do not have a contrasted CAT scan to let him know if they will be getting 

into trouble with an angioma.





So I ordered a multiphase CT of the abdomen 5/9.  There is no hemangioma.  No 

bleeding under the capsule. The liver masses were much better delineated for the

 radiologist.  We agreed that as long as I can get her INR below 2 she could go 

to biopsy on 5/10. 





Then I reviewed the radiology literature with 5/10 radiologist.  Their 

guidelines state that the INR needs to be below 1.5.  See problem #2.





Her pain is better with the morphine PCA pump.  I have ordered a 1 mg/h 

continuous infusion rate.  I am allowing her 1 mg every 10 minutes with a max of

 8 mg in 4 hours and I ordered a Palliative Care consult. 





Plan:


Continue above PCA pump


Palliative care has seen the patient today.  She was seen in the late afternoon 

and they have not had time to dictate their note.  They predict that this 

patient will most likely need to be discharged on long-acting opioids.  They 

will review how much morphine she has had delivered over the last 2 days and 

calculate an appropriate conversion dose and let me know.





5/12/23 - Appreciate palliative care consult.  Will change pain medication 

regimen per palliative care recommendation.Anticipate IR obtained liver biopsy.





(4) Altered mental status resolved


Impression: 


 By May 9 she was alert enough to process the information I was telling her and 

be appropriately grief stricken and fearful about the cancer diagnosis.  She was

 able to tell me what she was feeling, able to describe her pain well.  ,

 who was at the bedside yesterday, endorsed that she was the best she has been 

since being in the hospital.





She continues to be fatigued, weak, but is oriented to person place and time.





Plan:


Most likely her altered mental status was from the dehydration and the pain of 

liver mets.  I have encouraged nursing to get the patient out of bed and in a 

chair.  She is lost a lot of ground.  Plan is for her to be discharged home and 

want her to be able to stand and pivot and walk 1 step.





5/15patient has ordered home health OT PT nurse and bath aide consult





(5) Acute kidney injury resolved. 


Labs were all reviewed.  She normally runs a creatinine of 0.8.  With her 

history of 4 days of poor oral intake, she is dehydrated and this caused her 

BEN.  Her BUN/creatinine on admission were 41/2.3. Not only was her lisinopril 

stopped, she was started on IV fluids and >> 34/1.6>>18/0.9>>13/0.7 today. 


Plan:


Avoid nephrotoxins


Continue with IV fluids


Follow BMP daily





(6) Liver masses


Her CT scan then the ultrasound done  showed "innumerable" masses in the liver. 

 There is one particularly large mass measuring 6.2 cm in the right posterior 

lobe.


The ED provider was the first 1 to tell the patient and  that she 

probably has cancer. We had planned to get tissue sample for pathology.  

Conversation was held with radiology on May 8 and we needed to normalize her 

INR.  On May 8 she received vitamin K and FFP.  In spite of that, her INR is 

still over 4 today.  Today's radiologist wanted better imaging with contrast and

 a repeat CT was done.  Not only does she have the same liver masses, she 

appears to have nodules in the paracolic gutter indicating carcinomatosis.  

Lymphadenopathy.  She is still not ready for biopsy because of INR.  I am also 

noting that she has changes of inflammation on her gallbladder.  Could you have 

passed a stone?  Could she have primary hepatobiliary cancer that has 

metastasized.





In speaking to radiology I repeated the CT of abdomen and used contrast 5/9,  

and the repeat CAT scan has been helpful so he does not have to worry about a 

hemangioma.





I also spoke to oncology in the medical ambulatory clinic 5/9.  I presented the 

case to the oncologist.  Her hypotension and possible sepsis is definitely 

concerning.  But they feel that if we can get her through this temporary phase 

of illness, and have a tissue diagnosis, there are therapies that could allow 

her a decent quality of life in an extended life of a few years.  They feel it 

is worth getting a liver biopsy and moving forward with at least getting a tis

gina diagnosis.





I also spoke to the patient about CODE STATUS on 5/9.  She really did understand

 the possibility that she could have a terminal illness and she was 

understandably tearful and grief stricken.  This is all happening so fast.  I 

discussed what the resuscitative effort would mean with regards to CPR and 

intubation.  She states that she does not want that.  While she is willing to go

 to do treatment, get a biopsy, and see how she does, she feels that a full on 

resuscitative measures with CPR and intubation would not be what she wants.  

"What would be the point" is her statement. So CODE STATUS was changed to DO NOT

 RESUSCITATE.





Some of the tumor markers have already come back.  Alpha-fetoprotein is 1.8.  

Normal levels.  CA 15-3 is 623.  High.  CA 19-9 antigen is pending.  Ca1 25 

antigen is 14 and within normal range.  CEA is 1633.5. I started Decadron 

yesterday.  Some oncology literature suggest that pain is relieved by the anti-

inflammatory effects of the steroids.





Plan:


Tumor markers as already discussed above





She has a POLST form at the bedside that she would like me to fill out with her.

 I was not able to do that yesterday. She is asked that her  and daughter

 Juhi be the points of contact for the POLST form





By May 15, if she is still here, and off of the Levophed, I would request a 

liver biopsy before d





5/12/23-anticipate liver biopsy per IR





5/14/23- stable. Await IR biopsy of liver. ? tomorrow.INR=1.7 today and given 

Vit K 10 mg





5/15/23- Patient was supposed to have liver ultrasound biopsy today and is 

awaiting this.  Unfortunately she was served lunch and will need to check with 

radiology regarding further plans.  INR was 1.4 this a.m.





(8) Transaminitis


The family was all in the room and reported that the patient gets yearly 

thorough blood tests.  These were done in October 2022 and the LFTs were normal 

then ( I reviewed all labs in this EMR).


I questioned her about alcohol use and she hardly uses any.  The  and 

other family members concur. In following her liver function studies daily, they

 are improving.  If she truly had LFTs that were elevated because of tumor, the 

LFTs would not be getting better.  Since they are getting better it makes me 

wonder if she has a transitory process such as cholecystitis or a passed stone 

giving her the right upper quadrant pain that so severe today. I reiterated to 

them that her liver function studies could be normal in spite of having tumor in

 the liver.  I really do not think that something was missed in October on the 

basis of labs.


Plan:


Follow LFTs daily


Avoid hepatotoxins


Work-up of the liver mets as described in #6 above








(8) DM II


The patient takes metformin.  The  admitted she does not really follow a 

diabetic diet. Our treatment consist of ACHS fingerstick checks and sliding 

scale insulin coverage for this. A1c is 7.1%.  


5/8 her glucose was 96, 124, 154, 131.


5/9 her glucose was 108, 139. , 112, 139


May 10: 132, 143, 200


Today 163, 240 (I did start her on Decadron yesterday)


 She is eating 0 to 75% of her food. She says that she appreciates her diet 

being changed to regular diet on May 9.  She really did not like the dysphagia 

pured diet.


Plan:


Continue with ACHS fingerstick checks and sliding scale insulin coverage. 

Advance coverage tomorrow if her glucose is consistently above 200





(9) Chronic pain


I asked the patient and  why she is on methadone.  There is a history of 

previously needing to be on oxycodone at very high doses which was then 

transitioned to methadone.  Her chronic pain was in her neck and lower back. 


5/9 her right upper quadrant pain is so severe that she is requiring doses of 

morphine IV that are not holding her.  It is not her diffuse pain that is 

bothering her.


I stopped the methadone and started her on a PCA pump. Her pain is better 

controlled with the PCA pump.


5/11:  Part of the reason I requested a palliative care consult was discharge 

transitioning this patient to oral meds to control her pain.  Consult will be 

dictated later today and she will be making recommendations.





Plan:


PCA pump as noted above





5/12/23-patient will be transferred off of PCA and administer pain meds as 

recommended by palliative care








(10) Hypothyroidism


Her TSH is elevated but free T4 is normal.


Plan:


Continue with her same home thyroid replacement dose





(11) Hx of CVA


Patient has had 3 strokes, 2003, 2013 and 2014.  The chart indicates she has a 

"intracardiac shunt". She has chronic weakness on one side.  She uses walk


She is on chronic warfarin since those strokes


Plan:


Eventually she will need PT and OT evaluations.





(12)  BOBBI





Plan: I have reiterated to her family that her home machine needs to be brought 

in here.  Especially in view of the fact of the morphine use.











Subjective: Patient's mental status is improved and is slightly doing better 

with pain control





Objective: Vital signs and nursing notes reviewed


Head: Normocephalic atraumatic


General: Ill-appearing


Mouth: No lesions


Neck: Supple


Chest: Clear to auscultation


Cor: Regular rate and rhythm S1-S2


Abdomen: Soft tenderness in the right upper quadrant no rebound no guarding


Extremities: Trace bilateral pedal edema


Skin: Slightly decreased turgor


Psych: Mood and affect are appropriate but slightly blunted affect


Neuro: Alert and oriented x3, motor strength intact bilaterally

## 2023-05-16 LAB
ANION GAP SERPL CALCULATED.4IONS-SCNC: 12 MMOL/L (ref 6–13)
BASOPHILS NFR BLD AUTO: 0 10^3/UL (ref 0–0.1)
BASOPHILS NFR BLD AUTO: 0.3 %
BUN SERPL-MCNC: 20 MG/DL (ref 6–20)
CALCIUM UR-MCNC: 8.6 MG/DL (ref 8.5–10.3)
CHLORIDE SERPL-SCNC: 92 MMOL/L (ref 101–111)
CO2 SERPL-SCNC: 38 MMOL/L (ref 21–32)
CREAT SERPLBLD-SCNC: 0.7 MG/DL (ref 0.4–1)
EOSINOPHIL # BLD AUTO: 0 10^3/UL (ref 0–0.7)
EOSINOPHIL NFR BLD AUTO: 0.1 %
ERYTHROCYTE [DISTWIDTH] IN BLOOD BY AUTOMATED COUNT: 17.1 % (ref 12–15)
GFRSERPLBLD MDRD-ARVRAT: 82 ML/MIN/{1.73_M2} (ref 89–?)
GLUCOSE SERPL-MCNC: 160 MG/DL (ref 70–100)
HCT VFR BLD AUTO: 35.9 % (ref 37–47)
HGB UR QL STRIP: 11 G/DL (ref 12–16)
LYMPHOCYTES # SPEC AUTO: 1.6 10^3/UL (ref 1.5–3.5)
LYMPHOCYTES NFR BLD AUTO: 10.1 %
MCH RBC QN AUTO: 29.8 PG (ref 27–31)
MCHC RBC AUTO-ENTMCNC: 30.6 G/DL (ref 32–36)
MCV RBC AUTO: 97.3 FL (ref 81–99)
MONOCYTES # BLD AUTO: 1.3 10^3/UL (ref 0–1)
MONOCYTES NFR BLD AUTO: 8.5 %
NEUTROPHILS # BLD AUTO: 12.4 10^3/UL (ref 1.5–6.6)
NEUTROPHILS # SNV AUTO: 15.7 X10^3/UL (ref 4.8–10.8)
NEUTROPHILS NFR BLD AUTO: 78.8 %
NRBC # BLD AUTO: 0.05 X10^3/UL
NRBC # BLD AUTO: 0.3 /100WBC
PDW BLD AUTO: 12 FL (ref 7.9–10.8)
PLATELET # BLD: 130 10^3/UL (ref 130–450)
POTASSIUM SERPL-SCNC: 4.3 MMOL/L (ref 3.5–5)
RBC MAR: 3.69 10^6/UL (ref 4.2–5.4)
SODIUM SERPLBLD-SCNC: 142 MMOL/L (ref 135–145)

## 2023-05-16 RX ADMIN — PREGABALIN SCH MG: 100 CAPSULE ORAL at 08:10

## 2023-05-16 RX ADMIN — MORPHINE SULFATE SCH MG: 30 TABLET, FILM COATED, EXTENDED RELEASE ORAL at 08:11

## 2023-05-16 RX ADMIN — ONDANSETRON PRN MG: 2 INJECTION INTRAMUSCULAR; INTRAVENOUS at 08:55

## 2023-05-16 RX ADMIN — Medication SCH MG: at 08:11

## 2023-05-16 RX ADMIN — INSULIN LISPRO SCH UNIT: 100 INJECTION, SOLUTION INTRAVENOUS; SUBCUTANEOUS at 11:59

## 2023-05-16 RX ADMIN — NYSTATIN SCH APPLIC: 100000 POWDER TOPICAL at 08:11

## 2023-05-16 RX ADMIN — DIMETHICONE PRN APPLIC: 13000 CREAM TOPICAL at 05:49

## 2023-05-16 RX ADMIN — Medication SCH TAB: at 08:10

## 2023-05-16 RX ADMIN — SODIUM CHLORIDE, PRESERVATIVE FREE SCH ML: 5 INJECTION INTRAVENOUS at 08:12

## 2023-05-16 RX ADMIN — MIDODRINE HYDROCHLORIDE SCH MG: 2.5 TABLET ORAL at 08:11

## 2023-05-16 RX ADMIN — MORPHINE SULFATE PRN MG: 15 TABLET ORAL at 16:36

## 2023-05-16 RX ADMIN — MIDODRINE HYDROCHLORIDE SCH MG: 2.5 TABLET ORAL at 16:37

## 2023-05-16 RX ADMIN — DOCUSATE SODIUM SCH MG: 250 CAPSULE, LIQUID FILLED ORAL at 08:10

## 2023-05-16 RX ADMIN — PREGABALIN SCH MG: 100 CAPSULE ORAL at 20:37

## 2023-05-16 RX ADMIN — SODIUM CHLORIDE, PRESERVATIVE FREE SCH ML: 5 INJECTION INTRAVENOUS at 02:45

## 2023-05-16 RX ADMIN — FAMOTIDINE SCH MG: 20 TABLET, FILM COATED ORAL at 08:10

## 2023-05-16 RX ADMIN — SODIUM CHLORIDE, PRESERVATIVE FREE SCH ML: 5 INJECTION INTRAVENOUS at 16:37

## 2023-05-16 RX ADMIN — LEVOTHYROXINE SODIUM SCH MCG: 75 TABLET ORAL at 05:31

## 2023-05-16 RX ADMIN — SODIUM CHLORIDE, PRESERVATIVE FREE PRN ML: 5 INJECTION INTRAVENOUS at 05:31

## 2023-05-16 RX ADMIN — INSULIN LISPRO SCH: 100 INJECTION, SOLUTION INTRAVENOUS; SUBCUTANEOUS at 08:11

## 2023-05-16 RX ADMIN — INSULIN LISPRO SCH UNIT: 100 INJECTION, SOLUTION INTRAVENOUS; SUBCUTANEOUS at 16:37

## 2023-05-16 RX ADMIN — INSULIN LISPRO SCH UNIT: 100 INJECTION, SOLUTION INTRAVENOUS; SUBCUTANEOUS at 20:38

## 2023-05-16 RX ADMIN — MIDODRINE HYDROCHLORIDE SCH MG: 2.5 TABLET ORAL at 11:59

## 2023-05-16 RX ADMIN — NYSTATIN SCH APPLIC: 100000 POWDER TOPICAL at 20:45

## 2023-05-16 RX ADMIN — MORPHINE SULFATE SCH MG: 30 TABLET, FILM COATED, EXTENDED RELEASE ORAL at 20:37

## 2023-05-16 RX ADMIN — FAMOTIDINE SCH MG: 20 TABLET, FILM COATED ORAL at 20:37

## 2023-05-16 RX ADMIN — Medication SCH MCG: at 08:10

## 2023-05-16 NOTE — PROVIDER PROGRESS NOTE
Assessment/Plan





- Problem List


(1) Liver masses


Assessment/Plan: 


Her CT scan then the ultrasound done  showed "innumerable" masses in the liver. 

There is one particularly large mass measuring 6.2 cm in the right posterior 

lobe.


The ED provider was the first 1 to tell the patient and  that she juan cain has cancer. We had planned to get tissue sample for pathology.  Conversation

was held with radiology on May 8 and we needed to normalize her INR.  On May 8 

she received vitamin K and FFP.  In spite of that, her INR is still over 4 

today.  Today's radiologist wanted better imaging with contrast and a repeat CT 

was done.  Not only does she have the same liver masses, she appears to have 

nodules in the paracolic gutter indicating carcinomatosis.  Lymphadenopathy.  

She is still not ready for biopsy because of INR.  I am also noting that she has

changes of inflammation on her gallbladder.  Could you have passed a stone?  

Could she have primary hepatobiliary cancer that has metastasized.





In speaking to radiology I repeated the CT of abdomen and used contrast 5/9,  

and the repeat CAT scan has been helpful so he does not have to worry about a 

hemangioma.





I also spoke to oncology in the medical ambulatory clinic 5/9.  I presented the 

case to the oncologist.  Her hypotension and possible sepsis is definitely 

concerning.  But they feel that if we can get her through this temporary phase o

f illness, and have a tissue diagnosis, there are therapies that could allow her

a decent quality of life in an extended life of a few years.  They feel it is 

worth getting a liver biopsy and moving forward with at least getting a tissue 

diagnosis.





I also spoke to the patient about CODE STATUS on 5/9.  She really did understand

the possibility that she could have a terminal illness and she was un

derstandably tearful and grief stricken.  This is all happening so fast.  I 

discussed what the resuscitative effort would mean with regards to CPR and 

intubation.  She states that she does not want that.  While she is willing to go

to do treatment, get a biopsy, and see how she does, she feels that a full on 

resuscitative measures with CPR and intubation would not be what she wants.  

"What would be the point" is her statement. So CODE STATUS was changed to DO NOT

RESUSCITATE.





Some of the tumor markers have already come back.  Alpha-fetoprotein is 1.8.  

Normal levels.  CA 15-3 is 623.  High.  CA 19-9 antigen is pending.  Ca1 25 

antigen is 14 and within normal range.  CEA is 1633.5. I started Decadron 

yesterday.  Some oncology literature suggest that pain is relieved by the 

anti-inflammatory effects of the steroids.





Plan:


Tumor markers as already discussed above





She has a POLST form at the bedside that she would like me to fill out with her.

I was not able to do that yesterday. She is asked that her  and daughter 

Juhi be the points of contact for the POLST form





5/12/23-anticipate liver biopsy per IR





5/14/23- stable. Await IR biopsy of liver. ? tomorrow.INR=1.7 today and given 

Vit K 10 mg





5/15/23- Patient was supposed to have liver ultrasound biopsy today and is 

awaiting this.  Unfortunately she was served lunch and will need to check with 

radiology regarding further plans.  INR was 1.4 this a.m.





5/16/23 - Today I tried to coordinate anesthesia department with interventional 

radiology for her to get a liver biopsy.  I learned that anesthesia does not 

provide services for IR.  The IR doctor today said that it was the DI sedation 

nurse who felt the patient's ASA was too high and that she needed biopsy done 

elsewhere.  At the same time she was seen by palliative care provider Kathia Mcclain NP who discussed possible hospice.  By the end of the day the patient 

declined the biopsy, did not want to possibly get a liver laceration, and wanted

to go home under hospice care with plan for comfort care and dying.





(2) RUQ abdominal pain


Impression: 


This unfortunate female has already had imaging studies that tell us that she 

has multiple, multiple masses to her liver.  The presumptive thought is that she

has metastatic disease of unknown primary.  There is nodularity along the 

capsule as well as the parenchyma.  Gallbladder is unremarkable.  No 

hydronephrosis.  This was seen on the admission abdomen/pelvis CT May 7.  That 

was done without contrast.  Abdominal ultrasound also shows innumerable 

hypoechoic mass lesions.  A representative large complex mass in the posterior 

right hepatic lobe measures 6.2 cm.  The intrahepatic ducts appear nondilated.  

There is dilation of the extrahepatic duct with the common bile duct 1 cm (think

primary hepatobiliary w mets or a passed stone).  Her total bili was 1.7 on 

admission.  It is 1.1 today.  AST was 206 on admission and is 66 today.  ALT was

74 on admission and is now 45 today.  Alk phos was 311 and is 220 today.  

Ammonia level is 15.3.





5/9 The radiologist and I discussed the previous CT.  And our plans moving 

forward.  Our obvious suspicion is that she has cancer, either hepatobiliary of 

mets.  Or unknown primary with mets.  I am hoping for a pathologic piece of 

tissue to give us the diagnosis. 





I was worried that she may be bleeding in her liver causing such new intractable

pain.  After I reviewed her liver function studies, I also thought she could be 

having gallbladder symptoms even though she did not have stones on the admission

CT.  If her elevated liver function studies were from tumor, they should not be 

going down.  And they are going down. Radiologist is worried that they do not 

have a contrasted CAT scan to let him know if they will be getting into trouble 

with an angioma.





So I ordered a multiphase CT of the abdomen 5/9.  There is no hemangioma.  No 

bleeding under the capsule. The liver masses were much better delineated for the

radiologist.  We agreed that as long as I can get her INR below 2 she could go t

o biopsy on 5/10. 





Then I reviewed the radiology literature with 5/10 radiologist.  Their guideline

s state that the INR needs to be below 1.5.  See problem #2.





Her pain is better with the morphine PCA pump.  I have ordered a 1 mg/h 

continuous infusion rate.  I am allowing her 1 mg every 10 minutes with a max of

8 mg in 4 hours and I ordered a Palliative Care consult. 





Plan:


Continue above PCA pump


Palliative care has seen the patient today.  She was seen in the late afternoon 

and they have not had time to dictate their note.  They predict that this 

patient will most likely need to be discharged on long-acting opioids.  They 

will review how much morphine she has had delivered over the last 2 days and 

calculate an appropriate conversion dose and let me know.





5/12/23 - Appreciate palliative care consult.  Will change pain medication 

regimen per palliative care recommendation.Anticipate IR obtained liver biopsy.





5/16/23 - As above in #1, and the plan will be to go home with Hospice for 

comfort care.  Equipment to the home is to be delivered tomorrow and she will be

discharged home with narcotics for pain control





(3) Transaminitis


The family was all in the room and reported that the patient gets yearly 

thorough blood tests.  These were done in October 2022 and the LFTs were normal 

then ( I reviewed all labs in this EMR).


I questioned her about alcohol use and she hardly uses any.  The  and 

other family members concur. In following her liver function studies daily, they

are improving.  If she truly had LFTs that were elevated because of tumor, the 

LFTs would not be getting better.  Since they are getting better it makes me 

wonder if she has a transitory process such as cholecystitis or a passed stone 

giving her the right upper quadrant pain that so severe today. I reiterated to 

them that her liver function studies could be normal in spite of having tumor in

the liver.  I really do not think that something was missed in October on the 

basis of labs.


Plan:


By the end of the day she did not want her liver biopsy therefore no further la

bs will be done





(4) DM II


The patient takes metformin.  The  admitted she does not really follow a 

diabetic diet. Our treatment consist of ACHS fingerstick checks and sliding 

scale insulin coverage for this. A1c is 7.1%.  


5/8 her glucose was 96, 124, 154, 131.


5/9 her glucose was 108, 139. , 112, 139


May 10: 132, 143, 200


Today 163, 240 (I did start her on Decadron yesterday)


 She is eating 0 to 75% of her food. She says that she appreciates her diet 

being changed to regular diet on May 9.  She really did not like the dysphagia 

pured diet.


Plan:


We will allow comfort eating, since the plan is to go home for comfort care 





(5) Chronic pain


I asked the patient and  why she is on methadone.  There is a history of 

previously needing to be on oxycodone at very high doses which was then 

transitioned to methadone.  Her chronic pain was in her neck and lower back. 


5/9 her right upper quadrant pain is so severe that she is requiring doses of 

morphine IV that are not holding her.  It is not her diffuse pain that is 

bothering her.


I stopped the methadone and started her on a PCA pump. Her pain is better 

controlled with the PCA pump.


5/11:  Part of the reason I requested a palliative care consult was discharge 

transitioning this patient to oral meds to control her pain.  Consult will be 

dictated later today and she will be making recommendations.





Plan:


PCA pump as noted above


5/12/23-patient will be transferred off of PCA and administer pain meds as 

recommended by palliative care


5/16/23 -  As above in #1, and the plan will be to go home with Hospice for 

comfort care.  Equipment to the home is to be delivered tomorrow and she will be

discharged home with narcotics for pain control








(6) Hypothyroidism


Her TSH is elevated but free T4 is normal.


Plan:


Continue with her same home thyroid replacement dose





(7)  BOBBI


Cont to use her her home CPAP machine 





(8) Hx of CVA


Patient has had 3 strokes, 2003, 2013 and 2014.  The chart indicates she has a 

"intracardiac shunt". She has chronic weakness on one side.  She uses walk


She is on chronic warfarin since those strokes


Plan:


We initially plan to have PT and OT work with her.  Today she decided against 

any further work-up of the masses and will go home for comfort care under 

Hospice





(9) Hypotension


Impression: 


RESOLVED


She was transferred to the ICU May 9 for her hypotension.  In this unfortunate 

female, hypotension could be from the disease process in her liver.  CT scan 

shows extensive, multiple, masses in the liver.  A left paracolic gutter nodule,

and lymphadenopathy.  She does not appear to have signs and symptoms of sepsis 

other than hypotension and no objective source on CT, CXR or UA.   There is no 

pulmonary, GI, or  findings other than pericholecystic inflammation on CT done

on admission.  She is tender in the right upper quadrant.  She is not having an 

MI.  She does not have a GI bleed but could be bleeding in her liver due to mets

and an elevated INR (but no tachycardia on vitals).  She is not intravascularly 

depleted with dehydration





Treatment consisted of Levophed to keep mean arterial pressure at 65. The repeat

abdomen pelvis CT done yesterday for the intractable right upper quadrant pain 

has no evidence of obstruction.  She continues to have left paracolic gutter 

lesion, a liver that is filled with innumerable masses.  She has nonspecific 

pericholecystic fluid and wall thickening.  The biliary tree and pancreatic duct

are prominent without definite pathologic dilation or obstruction.  Possible 

small splenic cyst.  Pathologic appearing lymph nodes.  Pelvis has a small 

amount of free fluid.  Elias is in place with an under distended bladder.  She 

had a hysterectomy.





Since infection could not be completely ruled out,  I started her on broad-

spectrum antibiotics with cefepime, vancomycin, and Flagyl.  Today is day #3.





She did have blood cultures on May 7.  Those were positive today with 

gram-positive cocci.  PCR has staph species.  Since they became positive 3 days 

after they were done, I suspect this is contamination. I also ordered blood 

cultures on May 9.  Those blood cultures have had no growth after 24 hours.





Levophed continues at 2 mcg.  Blood pressure is 136 systolic at times.  Urine o

utput is maintained.





Plan:


Stop empiric antibiotics and monitor for signs and symptoms of infection


I Reduced Levophed to 1 mcg and see how she does.  I have again encouraged 

nursing to see if we can get her off the Levophed.


I have asked nursing to please get this patient out of bed and into a chair.  

And I have asked physical therapy to start working with her.





5/12/2023 - Patient is currently off of IV Levophed and is having adequate blood

pressure support 





(10) Supratherapeutic INR


Impression: 


RESOLVED


When the patient was admitted she was admitted with an INR of greater than 10.  

She takes medication for atrial fibrillation which includes Coumadin.  She was 

taking her medication in spite of not having any p.o. intake for 4 days.  

Between the liver being replaced by cancer, dehydration, and taking her 

Coumadin, she was supratherapeutic.  She received FFP on admission.  On the 

second day her INR was 4.7 and she received vitamin K.  the third day, her INR 

was 4.3 and she received more vitamin K.  We are attempting to get her down to 

less than 1.5.  She needs this for radiology for liver biopsy.





On the 4th day, INR was 1.9.  Radiology had cancelled her biopsy and has asked 

that I treat her again for the INR.  Family is understandably worried and upset.

 They really want this biopsy to see what the diagnosis is going to be to them 

let oncology know what treatment would be.





I ordered fresh frozen plasma.  But we ended up not using it.  Radiology 

reviewed the chart and felt that since the patient was on Levophed, 

"hemodynamically unstable", she was not a good candidate for liver biopsy due to

the risk of bleeding.  As such the biopsy was canceled.  Radiology was able to 

come and speak to the family and explained their thought process to the family.





5/12/23 -


-Awaiting INR to be 1.5 per interventional radiology in order to do biopsy





(11) Altered mental status 


Impression: 


RESOLVED


 By May 9 she was alert enough to process the information I was telling her and 

be appropriately grief stricken and fearful about the cancer diagnosis.  She was

able to tell me what she was feeling, able to describe her pain well.  , 

who was at the bedside yesterday, endorsed that she was the best she has been 

since being in the hospital.





She continues to be fatigued, weak, but is oriented to person place and time.





Plan:


Most likely her altered mental status was from the dehydration and the pain of 

liver mets.  I have encouraged nursing to get the patient out of bed and in a 

chair.  She is lost a lot of ground.  Plan is for her to be discharged home and 

want her to be able to stand and pivot and walk 1 step.





5/15patient has ordered home health OT PT nurse and bath aide consult





(12) Acute kidney injury resolved


RESOLVED


She normally runs a creatinine of 0.8.  With her history of 4 days of poor oral 

intake, she is dehydrated and this caused her BEN.  Her BUN/creatinine on 

admission were 41/2.3. Not only was her lisinopril stopped, she was started on 

IV fluids and >> 34/1.6>>18/0.9>>13/0.7 today. 


Plan:


Avoid nephrotoxins


Continue with IV fluids


Follow BMP daily











- Current Meds


Current Meds: 





                               Current Medications











Generic Name Dose Route Start Last Admin





  Trade Name Alberto  PRN Reason Stop Dose Admin


 


Calcium Citrate  500 mg  05/09/23 09:00  05/16/23 08:11





  Calcium Citrate 250 Mg Tablet  PO   500 mg





  DAILY SHARRON   Administration


 


Cholecalciferol  25 mcg  05/09/23 09:00  05/16/23 08:10





  Cholecalciferol 25 Mcg Tablet  PO   25 mcg





  DAILY SHARRON   Administration


 


Dexamethasone  2 mg  05/10/23 13:10  05/16/23 08:10





  Dexamethasone 4 Mg Tablet  PO   2 mg





  BIDWM SHARRON   Administration


 


Docusate Sodium  250 - 500 mg  05/11/23 12:00  05/16/23 08:10





  Docusate Sodium 250 Mg Capsule  PO   250 mg





  DAILY SHARRON   Administration


 


Famotidine  20 mg  05/09/23 21:00  05/16/23 08:10





  Famotidine 20 Mg Tablet  PO   20 mg





  BID SHARRON   Administration


 


Insulin Human Lispro  1 - 9 unit  05/07/23 21:00  05/16/23 11:59





  Insulin Lispro 300 Unit/3 Ml Pen  SUBQ   1 unit





  0800,1200,1700,2100 SHARRON   Administration





  Protocol  


 


Levothyroxine Sodium  150 mcg  05/08/23 07:30  05/16/23 05:31





  Levothyroxine 75 Mcg Tablet  PO   150 mcg





  QDAC SHARRON   Administration


 


Midodrine  5 mg  05/12/23 12:00  05/16/23 11:59





  Midodrine 2.5 Mg Tablet  PO   5 mg





  TIDWM SHARRON   Administration


 


Mineral Oil  1 applic  05/08/23 10:13  05/16/23 05:49





  Min Oil/Dimethicon/Coconut Oil 92 Gm Tube  TOP   1 applic





  PRN PRN   Administration





  Skin Care  


 


Morphine Sulfate  7.5 mg  05/12/23 11:28  05/15/23 16:40





  Morphine Ir 15 Mg Tablet  PO   7.5 mg





  Q6HR PRN   Administration





  BREAKTHROUGH Pain (Level 1-10)  


 


Morphine Sulfate  30 mg  05/12/23 21:00  05/16/23 08:11





  Morphine Sulfate Er 30 Mg Tablet  PO   30 mg





  BID SHARRON   Administration


 


Multivitamins/Minerals  1 tab  05/09/23 08:00  05/16/23 08:10





  Multivitamin W/Minerals Tablet  PO   1 tab





  DAILYWM SHARRON   Administration


 


Nystatin  1 applic  05/08/23 11:47  05/16/23 08:11





  Nystatin Powder 15 Gm  TOP   1 applic





  BID SHARRON   Administration


 


Ondansetron HCl  4 mg  05/08/23 10:19  05/16/23 08:55





  Ondansetron 4 Mg/2 Ml Vial  IVP   4 mg





  Q4HR PRN   Administration





  Nausea / Vomiting  


 


Polyethylene Glycol  17 gm  05/08/23 09:00  05/16/23 08:11





  Polyethylene Glycol 3350 17 Gm Packet  PO   17 gm





  DAILY SHARRON   Administration


 


Pregabalin  200 mg  05/08/23 12:30  05/16/23 08:10





  Pregabalin 100 Mg Capsule  PO   200 mg





  BID SHARRON   Administration


 


Sodium Chloride  10 ml  05/07/23 20:11  05/16/23 05:31





  Sodium Chloride Flush 0.9% 10 Ml Syringe  IVP   10 ml





  PRN PRN   Administration





  AS NEEDED PER PROVIDER ORDERS  


 


Sodium Chloride  10 ml  05/08/23 01:00  05/16/23 08:12





  Sodium Chloride Flush 0.9% 10 Ml Syringe  IVP   10 ml





  0100,0900,1700 SHARRON   Administration


 


Sodium Chloride  20 ml  05/10/23 01:17  05/16/23 05:31





  Sodium Chloride Flush 0.9% 10 Ml Syringe  IVP   20 ml





  PRN PRN   Administration





  After Blood Draw  


 


Tizanidine HCl  8 mg  05/08/23 21:00  05/15/23 21:13





  Tizanidine 4 Mg Tablet  PO   8 mg





  QPM SHARRON   Administration














- Lab Result


Fish Bone Diagrams: 


                                 05/16/23 05:33





                                 05/16/23 05:33





- Additional Planning


My Orders: 





My Active Orders





05/16/23


Hospice Referral for Post-Discharge Services [CONS] Routine 














Subjective





- Subjective


Patient Reports: Fatigue, Pain





Objective


Vital Signs: 





                               Vital Signs - 24 hr











  05/16/23 05/16/23 05/16/23





  05:41 07:38 08:00


 


Temperature 36.6 C  36.6 C


 


Heart Rate [ 69  62





Brachial]   


 


Respiratory 16  18





Rate   


 


Blood Pressure 140/71 H  164/70 H





[Left Brachial   





artery]   


 


O2 Saturation 93  99


 


If not protocol 1 1 1





: Oxygen Flow,   





liters/minute   














  05/16/23





  08:20


 


Temperature 


 


Heart Rate [ 70





Brachial] 


 


Respiratory 





Rate 


 


Blood Pressure 142/60 H





[Left Brachial 





artery] 


 


O2 Saturation 


 


If not protocol 





: Oxygen Flow, 





liters/minute 








                                     Oxygen











O2 Source [With Activity]      Nasal cannula


 


O2 Source                      Nasal cannula


 


Oxygen Flow Rate               2














I&O (Last 24 Hrs): 





                          Intake and Output Totals x24h











 05/14/23 05/15/23 05/16/23





 23:59 23:59 23:59


 


Intake Total 877 1190 240


 


Output Total 1550 2500 1900


 


Balance -673 -1310 -1660











General: Moderate distress (From pain), Other (Lethargic)


HEENT: Mucous membr. moist/pink


Neck: Supple


Neuro: Alert (She appears tired, she has marked weakness globally)


Cardiovascular: Regular rate


Respiratory: No respiratory distress


Abdomen: Other (Mildly distended, mildly tender in the upper abdomen)


Extremities: No clubbing, No tenderness/swelling





- Results


Results: 





                               Laboratory Results











WBC  15.7 x10^3/uL (4.8-10.8)  H  05/16/23  05:33    


 


RBC  3.69 10^6/uL (4.20-5.40)  L  05/16/23  05:33    


 


Hgb  11.0 g/dL (12.0-16.0)  L  05/16/23  05:33    


 


Hct  35.9 % (37.0-47.0)  L  05/16/23  05:33    


 


MCV  97.3 fL (81.0-99.0)   05/16/23  05:33    


 


MCH  29.8 pg (27.0-31.0)   05/16/23  05:33    


 


MCHC  30.6 g/dL (32.0-36.0)  L  05/16/23  05:33    


 


RDW  17.1 % (12.0-15.0)  H  05/16/23  05:33    


 


Plt Count  130 10^3/uL (130-450)   05/16/23  05:33    


 


MPV  12.0 fL (7.9-10.8)  H  05/16/23  05:33    


 


Neut # (Auto)  12.4 10^3/uL (1.5-6.6)  H  05/16/23  05:33    


 


Lymph # (Auto)  1.6 10^3/uL (1.5-3.5)   05/16/23  05:33    


 


Mono # (Auto)  1.3 10^3/uL (0.0-1.0)  H  05/16/23  05:33    


 


Eos # (Auto)  0.0 10^3/uL (0.0-0.7)   05/16/23  05:33    


 


Baso # (Auto)  0.0 10^3/uL (0.0-0.1)   05/16/23  05:33    


 


Absolute Nucleated RBC  0.05 x10^3/uL  05/16/23  05:33    


 


Total Counted  100   05/14/23  04:47    


 


Band Neuts % (Manual)  1 % (0-10)  05/14/23  04:47    


 


Abnorm Lymph % (Manual)  0 %  05/14/23  04:47    


 


Metamyelocytes %  2 % (-0) H  05/14/23  04:47    


 


Nucleated RBC %  0.3 /100WBC  05/16/23  05:33    


 


Neutrophils # (Manual)  9.2 10^3/uL (1.5-6.6)  H  05/14/23  04:47    


 


Lymphocytes # (Manual)  0.8 10^3/uL (1.5-3.5)  L  05/14/23  04:47    


 


Monocytes # (Manual)  1.7 10^3/uL (0.0-1.0)  H  05/14/23  04:47    


 


Eosinophils # (Manual)  0.0 10^3/uL (0-0.7)   05/14/23  04:47    


 


Basophils # (Manual)  0.0 10^3/uL (0-0.1)   05/14/23  04:47    


 


Differential Comment  MANUAL DIFFERENTIAL   05/14/23  04:47    


 


Manual Slide Review  Indicated   05/14/23  04:47    


 


WBC Morphology  NORMAL APPEARANCE  (NORMAL)   05/14/23  04:47    


 


Platelet Estimate  NORMAL (130-450,000)  (NORMAL)   05/14/23  04:47    


 


Platelet Morphology  NORMAL APPEARANCE  (NORMAL)   05/14/23  04:47    


 


RBC Morph Micro Appear  1+ ANISOCYTOSIS  (NORMAL) 1+ TEARDROP CELLS  (NORMAL)   

05/14/23  04:47    


 


RBC Morph Micro Appear  1+ ANISOCYTOSIS  (NORMAL) 1+ TEARDROP CELLS  (NORMAL)   

05/14/23  04:47    


 


PT  15.1 secs (9.9-12.6)  H  05/15/23  11:04    


 


INR  1.4  (0.8-1.2)  H  05/15/23  11:04    


 


VBG pH  7.367  (7.31-7.41)   05/12/23  04:40    


 


VBG pCO2  44.1 mmHg (41-51)   05/07/23  18:39    


 


VBG pO2  45.6 mmHg (25-47)   05/07/23  18:39    


 


VBG HCO3  23.4 mmol/L (23-28)   05/07/23  18:39    


 


VBG Total CO2  24.8 mmol/L (24-29)   05/07/23  18:39    


 


VBG O2 Saturation  78.9 % (60-80)   05/07/23  18:39    


 


VBG Base Excess  -2.4 mmol/L (-2 - +2)  L  05/07/23  18:39    


 


Ionized Calcium  1.08 mmol/L (1.15-1.33)  L  05/12/23  04:40    


 


Sodium  142 mmol/L (135-145)   05/16/23  05:33    


 


Potassium  4.3 mmol/L (3.5-5.0)   05/16/23  05:33    


 


Chloride  92 mmol/L (101-111)  L  05/16/23  05:33    


 


Carbon Dioxide  38 mmol/L (21-32)  H  05/16/23  05:33    


 


Anion Gap  12.0  (6-13)   05/16/23  05:33    


 


BUN  20 mg/dL (6-20)   05/16/23  05:33    


 


Creatinine  0.7 mg/dL (0.4-1.0)   05/16/23  05:33    


 


Estimated GFR (MDRD)  82  (>89)  L  05/16/23  05:33    


 


Glucose  160 mg/dL ()  H  05/16/23  05:33    


 


POC Whole Bld Glucose  180 mg/dL (70 - 100)  H  05/16/23  11:30    


 


Estimat Average Glucose  157 mg/dL ()  H  05/08/23  05:45    


 


Hemoglobin A1c %  7.1 % (4.27-6.07)  H  05/08/23  05:45    


 


Lactic Acid  0.8 mmol/L (0.5-2.2)   05/09/23  02:42    


 


Calcium  8.6 mg/dL (8.5-10.3)   05/16/23  05:33    


 


Phosphorus  2.2 mg/dL (2.5-4.6)  L  05/12/23  04:40    


 


Magnesium  2.1 mg/dL (1.7-2.8)   05/12/23  04:40    


 


Total Bilirubin  1.1 mg/dL (0.2-1.0)  H  05/10/23  04:38    


 


AST  70 IU/L (10-42)  H  05/10/23  04:38    


 


ALT  52 IU/L (10-60)   05/10/23  04:38    


 


Alkaline Phosphatase  303 IU/L ()  H  05/10/23  04:38    


 


Ammonia  15.3 umol/L (7-35)   05/09/23  05:10    


 


Troponin I High Sens  56.9 ng/L (2.3-14.8)  H*  05/09/23  05:10    


 


B-Natriuretic Peptide  81 pg/mL (5-100)   05/09/23  02:42    


 


Total Protein  5.5 g/dL (6.7-8.2)  L  05/10/23  04:38    


 


Albumin  2.2 g/dL (3.2-5.5)  L  05/10/23  04:38    


 


Globulin  3.3 g/dL (2.1-4.2)   05/10/23  04:38    


 


Albumin/Globulin Ratio  0.7  (1.0-2.2)  L  05/10/23  04:38    


 


Tumor Marker AFP  1.8 ng/mL (0.0-9.2)   05/08/23  05:45    


 


Carcinoembryonic Ag  1633.5 ng/mL  05/08/23  05:45    


 


CA 15-3 Antigen  625.2 U/mL (0.0-31.3)  H  05/08/23  05:45    


 


CA 19-9 Antigen  628385 U/mL (0-35)  H  05/08/23  05:45    


 


 Antigen  14.0 U/mL (0.0-35.0)   05/08/23  05:45    


 


TSH  7.06 uIU/mL (0.34-5.60)  H  05/07/23  18:39    


 


Free T4  0.98 ng/dL (0.58-1.64)   05/08/23  05:45    


 


Urine Color  YELLOW   05/07/23  19:16    


 


Urine Clarity  HAZY  (CLEAR)   05/07/23  19:16    


 


Urine pH  5.5 PH (5.0-7.5)   05/07/23  19:16    


 


Ur Specific Gravity  1.020  (1.002-1.030)   05/07/23  19:16    


 


Urine Protein  100 mg/dL (NEGATIVE)  H  05/07/23  19:16    


 


Urine Glucose (UA)  NEGATIVE mg/dL (NEGATIVE)   05/07/23  19:16    


 


Urine Ketones  NEGATIVE mg/dL (NEGATIVE)   05/07/23  19:16    


 


Urine Occult Blood  LARGE  (NEGATIVE)  H  05/07/23  19:16    


 


Urine Nitrite  NEGATIVE  (NEGATIVE)   05/07/23  19:16    


 


Urine Bilirubin  NEGATIVE  (NEGATIVE)   05/07/23  19:16    


 


Urine Urobilinogen  0.2 (NORMAL) E.U./dL (NORMAL)   05/07/23  19:16    


 


Ur Leukocyte Esterase  NEGATIVE  (NEGATIVE)   05/07/23  19:16    


 


Urine RBC  0-5 /HPF (0-5)   05/07/23  19:16    


 


Urine WBC  0-3 /HPF (0-5)   05/07/23  19:16    


 


Ur Squamous Epith Cells  RARE Squamous  (<= Few)   05/07/23  19:16    


 


Amorphous Sediment  Few /LPF  05/07/23  19:16    


 


Urine Bacteria  Few /HPF (None Seen)   05/07/23  19:16    


 


Urine Culture Comments  NOT INDICATED   05/07/23  19:16    


 


Nasal Adenovirus (PCR)  NOT DETECTED   05/07/23  18:36    


 


Nasal B. parapertussis DNA (PCR)  NOT DETECTED   05/07/23  18:36    


 


Nasal Coronavir 229E PCR  NOT DETECTED   05/07/23  18:36    


 


Nasal Coronavir HKU1 PCR  NOT DETECTED   05/07/23  18:36    


 


Nasal Coronavir NL63 PCR  NOT DETECTED   05/07/23  18:36    


 


Nasal Coronavir OC43 PCR  NOT DETECTED   05/07/23  18:36    


 


Nasal Enterovir/Rhinovir PCR  NOT DETECTED   05/07/23  18:36    


 


Nasal Influenza B PCR  NOT DETECTED   05/07/23  18:36    


 


Nasal Influenza A PCR  NOT DETECTED   05/07/23  18:36    


 


Nasal Parainfluen 1 PCR  NOT DETECTED   05/07/23  18:36    


 


Nasal Parainfluen 2 PCR  NOT DETECTED   05/07/23  18:36    


 


Nasal Parainfluen 3 PCR  NOT DETECTED   05/07/23  18:36    


 


Nasal Parainfluen 4 PCR  NOT DETECTED   05/07/23  18:36    


 


Nasal RSV (PCR)  NOT DETECTED   05/07/23  18:36    


 


Nasal Screen MRSA (PCR)  NEGATIVE  (NEGATIVE)   05/09/23  20:33    


 


Nasal B.pertussis DNA PCR  NOT DETECTED   05/07/23  18:36    


 


Nasal C.pneumoniae (PCR)  NOT DETECTED   05/07/23  18:36    


 


Tab Human Metapneumo PCR  NOT DETECTED   05/07/23  18:36    


 


Nasal M.pneumoniae (PCR)  NOT DETECTED   05/07/23  18:36    


 


Nasal SARS-CoV-2 (PCR)  NOT DETECTED   05/07/23  18:36    


 


Urine Opiates Screen  NEGATIVE  (NEGATIVE)   05/07/23  19:16    


 


Ur Oxycodone Screen  NEGATIVE  (NEGATIVE)   05/07/23  19:16    


 


Urine Methadone Screen  POSITIVE  (NEGATIVE)  H  05/07/23  19:16    


 


Ur Propoxyphene Screen  NEGATIVE  (NEGATIVE)   05/07/23  19:16    


 


Ur Barbiturates Screen  NEGATIVE  (NEGATIVE)   05/07/23  19:16    


 


Ur Tricyclics Screen  NEGATIVE  (NEGATIVE)   05/07/23  19:16    


 


Ur Phencyclidine Scrn  NEGATIVE  (NEGATIVE)   05/07/23  19:16    


 


Ur Amphetamine Screen  NEGATIVE  (NEGATIVE)   05/07/23  19:16    


 


U Methamphetamines Scrn  NEGATIVE  (NEGATIVE)   05/07/23  19:16    


 


U Benzodiazepines Scrn  NEGATIVE  (NEGATIVE)   05/07/23  19:16    


 


Urine Cocaine Screen  NEGATIVE  (NEGATIVE)   05/07/23  19:16    


 


U Cannabinoids Screen  NEGATIVE  (NEGATIVE)   05/07/23  19:16    


 


Ethyl Alcohol  < 5.0 mg/dL  05/07/23  18:39    


 


Blood Type  B POSITIVE   05/07/23  19:13    


 


Blood Type Recheck  B POSITIVE   05/07/23  18:39

## 2023-05-16 NOTE — CONSULTATION NOTE
Palliative Care Follow Up





- Referral


Referring Provider: Dr. Luz Dillard


Time of Visit: 2:15-2:45; 3:15 -4:10 pm


Referral setting: Hospitalized patient


Referral Reason: Unknown Primary Cancer with Liver Mets; Goals of care





- Information Sources


Records reviewed: RN notes reviewed


History/Review of Systems obtained from: Patient, Family ()





- History of Present Illness Update


Brief HPI Update: 


See HPI for further information. This is a 73-year-old woman who is admitted 

acutely on 2023 for significant changes in mental status, worsening 

abdominal pain and weakness.  Patient has still not had her liver biopsy, there 

is been some confusion regarding this, and it has been tentatively rescheduled 

for tomorrow.  In the meantime palliative care has had a goals of care 

conversation with patient, please see palliative care discussion.  Patient has 

continued be quite weak, though has gotten up out of bed with significant assi

stance, has very poor appetite, her pain is currently controlled on her current 

regimen, but she is quite anxious to get home.





Past Medical History: 


Hypertension, history of 3 strokes, peripheral neuropathy, type 2 diabetes, 

hypothyroidism, chronic constipation, osteoarthritis, chronic back pain








Social History





- Living Situation


Living arrangement: At home


Living Situation: With spouse/s.o.


Support System: 


[place] and her family, there is

2 houses as well as an apartment on the property, she and her  live in 

plan, her daughter and her family live in the other, and their grandson lives in

the apartment.  They do have 4 children total, and 10 grandkids.  They do have a

large and supportive family.  The daughters have been here taking turns to sleep

and provide support so father can go home.  Her sister has arrived as well, they

are all appropriately concerned. 








Medications/Allergies





- Medications


Active Medication List: 





Active Medications





Calcium Citrate (Calcium Citrate 250 Mg Tablet)  500 mg PO DAILY [facility]


   Last Admin: 23 08:11 Dose:  500 mg


   


Cholecalciferol (Cholecalciferol 25 Mcg Tablet)  25 mcg PO DAILY [facility]


   Last Admin: 23 08:10 Dose:  25 mcg


   


Dexamethasone (Dexamethasone 4 Mg Tablet)  2 mg PO BIDWM [facility]


   Last Admin: 23 08:10 Dose:  2 mg


   


Docusate Sodium (Docusate Sodium 250 Mg Capsule)  250 - 500 mg PO DAILY [facility]


   Last Admin: 23 08:10 Dose:  250 mg


   


Famotidine (Famotidine 20 Mg Tablet)  20 mg PO BID [facility]


   Last Admin: 23 08:10 Dose:  20 mg


   


Insulin Human Lispro (Insulin Lispro 300 Unit/3 Ml Pen)  1 - 9 unit SUBQ 

0800,1200,1700,2100 [facility]; Protocol


   Last Admin: 23 11:59 Dose:  1 unit


   


Levothyroxine Sodium (Levothyroxine 75 Mcg Tablet)  150 mcg PO QDAC [facility]


   Last Admin: 23 05:31 Dose:  150 mcg


   


Midodrine (Midodrine 2.5 Mg Tablet)  5 mg PO TIDWM [facility]


   Last Admin: 23 11:59 Dose:  5 mg


   


Mineral Oil (Min Oil/Dimethicon/Coconut Oil 92 Gm Tube)  1 applic TOP PRN PRN


   PRN Reason: Skin Care


   Last Admin: 23 05:49 Dose:  1 applic


   


Morphine Sulfate (Morphine Ir 15 Mg Tablet)  7.5 mg PO Q6HR PRN


   PRN Reason: BREAKTHROUGH Pain (Level 1-10)


   Last Admin: 05/15/23 16:40 Dose:  7.5 mg


   


Morphine Sulfate (Morphine Sulfate Er 30 Mg Tablet)  30 mg PO BID [facility]


   Last Admin: 23 08:11 Dose:  30 mg


   


Multivitamins/Minerals (Multivitamin W/Minerals Tablet)  1 tab PO DAILYWM [facility]


   Last Admin: 23 08:10 Dose:  1 tab


   


Naloxone HCl (Naloxone 0.4 Mg/Ml Vial)  0.4 mg IVP PRN PRN


   PRN Reason: respiration <10


Nystatin (Nystatin Powder 15 Gm)  1 applic TOP BID [facility]


   Last Admin: 23 08:11 Dose:  1 applic


   


Ondansetron HCl (Ondansetron 4 Mg/2 Ml Vial)  4 mg IVP Q4HR PRN


   PRN Reason: Nausea / Vomiting


   Last Admin: 23 08:55 Dose:  4 mg


   


Polyethylene Glycol (Polyethylene Glycol 3350 17 Gm Packet)  17 gm PO DAILY [facility]


   Last Admin: 23 08:11 Dose:  17 gm


   


Pregabalin (Pregabalin 100 Mg Capsule)  200 mg PO BID [facility]


   Last Admin: 23 08:10 Dose:  200 mg


   


Sodium Chloride (Sodium Chloride Flush 0.9% 10 Ml Syringe)  10 ml IVP PRN PRN


   PRN Reason: AS NEEDED PER PROVIDER ORDERS


   Last Admin: 23 05:31 Dose:  10 ml


   


Sodium Chloride (Sodium Chloride Flush 0.9% 10 Ml Syringe)  10 ml IVP 

0100,0900,1700 [facility]


   Last Admin: 23 08:12 Dose:  10 ml


   


Sodium Chloride (Sodium Chloride Flush 0.9% 10 Ml Syringe)  20 ml IVP PRN PRN


   PRN Reason: After Blood Draw


   Last Admin: 23 05:31 Dose:  20 ml


   


Tizanidine HCl (Tizanidine 4 Mg Tablet)  8 mg PO QPM [facility]


   Last Admin: 05/15/23 21:13 Dose:  8 mg


   





                                        





Atorvastatin Calcium 40 mg PO DAILY 21 


Furosemide [Lasix] 20 mg PO DAILY 21 


Levothyroxine Sodium [Levothyroxine] 150 mcg PO DAILY 21 


Methadone [Methadone Hcl] 5 mg PO BID 21 


Pregabalin [Lyrica] 150 mg PO BID 21 


Warfarin [Coumadin] 5 mg PO DAILY 21 


amLODIPine [Norvasc] 5 mg PO DAILY 21 


tiZANidine [Zanaflex] 8 mg PO QPM 21 


Metformin HCl [Metformin ER Osmotic] 500 mg PO BID 21 


Aspirin [Aspirin EC] 1 tab PO DAILY PRN 23 


Calcium Citrate/Vitamin D3 [Calcium Cit 315-Vit D3 250 Cpt] 2 tab PO DAILY 

23 


Cholecalciferol [Vitamin D3] 1 tab PO DAILY 23 


Famotidine [Pepcid] 1 tab PO HS PRN 23 


Lisinopril [Zestril] 1 tab PO DAILY 23 


Multivitamin with Minerals [Multivitamins with Minerals] 1 tab PO DAILY 23




Pregabalin 1 cap PO BID 23 











- Allergies


Allergies/Adverse Reactions: 


                                    Allergies











Allergy/AdvReac Type Severity Reaction Status Date / Time


 


No Known Drug Allergies Allergy   Verified 10/17/22 14:56














Review of Systems





- Constitutional


Constitutional: reports: Fatigue, Weakness, Poor appetite (has had early satiety

for a long period of time), Weight loss (20 pounds)





- Ears, Nose & Throat


Ears, Nose & Throat: reports: Hearing loss, Dry mouth





- Cardiovascular


Cardiovascular: reports: Exertional dyspnea





- Respiratory


Respiratory: reports: SOB with exertion





- Gastrointestinal


Gastrointestinal: reports: Abdominal pain, Constipation, Bloating, Poor 

appetite, Early satiety





- Musculoskeletal


Musculoskeletal: reports: Back pain, Limited range of motion, Muscle weakness, 

Assistive devices (uses cane at home), Transfer issues





- Integumentary


Integumentary: reports: Dryness





- Neurological


Neurological: reports: General weakness, Numbness, Pre-existing deficit, 

Abnormal gait





- Psychiatric


Psychiatric: reports: Anxiety





- Endocrine


Endocrine: reports: Diabetes type 2, Hypothyroidism





- Hematologic/Lymphatic


Hematologic/Lymph: reports: Anemia





- All Other Systems


All Other Systems: reports: Other (limited ROS;)





Physical Exam





- Vital Signs


Vital Signs: 





                                Vital Signs x48h











  Pulse BP


 


 23 08:20  70  142/60 H














- Physical Exam


General Appearance: positive: No acute distress


Eyes Bilateral: positive: Normal inspection


ENT: positive: Dry mucous membranes


Neck: positive: Trachea midline


Cardiovascular: positive: Regular rate & rhythm


Respiratory: positive: No respiratory distress


Abdomen: positive: Tenderness (right upper quadrant), Guarding, Distended, Obese


Skin: positive: Pallor, Dryness


Extremities: positive: No pedal edema


Neurologic/Psychiatric: positive: Oriented x3, Depressed mood/affect, Flat 

affect





Palliative Care





- POLST


Patient has POLST: Yes


POLST Status: DNR


Pain: Pain worsening, Location (right upper quadrant;)


Performance Status: 





Patient has been mostly bedbound since hospitalization, and had acute functional

decline prior to this.  She was able to get out of bed today with PT/OT, but 

with significant effort.  She is quite weak, but was very pleased with herself.








- Palliative Care


Discussion: 


1430 Met with patient, regarding goals of care.  She was by herself.  Just 

checked in to see how she was doing, she feels like she is going backwards, not 

feeling very well.  We did discuss in the context of her cancer, pending liver 

biopsy.  She feels like she is not can to get better, and at that point they 

were talking about her liver biopsy next week.  We discussed what was most 

important to her.  She very much wants to go home, she is very much supported by

her family and spend time with her family.  We discussed also that her CA 19 is 

quite high, this often indicates pancreatic cancer, still would have some 

treatment options, but she would need to be more functional as far as weighing 

benefits and burdens and not causing more harm than good.  She told me her 

mother  of pancreatic cancer.  Patient is quite pragmatic in the context of 

her health, she is quite surprised because of her sudden decline, but has not 

been doing well for several weeks to months.  Asked her permission if I could 

talk to her little bit about other choice that she might have around suppo

rtive/hospice care.  Introduced that hospice was not about dying, but about 

living until you die. Talked a little bit about the hospice benefit with that 

might look like as far as providing equipment, medications, the nursing and 

meeting services.  But goals need to be comfort focused goals, would not include

further work-up for her cancer or return to the hospital.  She asked that I talk

to her  Jimenez about this information, she reports that she her family 

really wants her to "fight".  She is quite exhausted, and feels like she is 

getting worse and not better.





15:15 Met with Jimenez , reviewed the information I gone over with patient. 

He reports when he came back they did have a conversation, about pancreatic 

cancer, reported that it was one of the tumor markers they had reviewed, he had 

not known that it was elevated and most likely a culprit for her underlying 

disease process.  We did talk about what patient had expressed, is that she just

wanted to go home, did not want the liver biopsy, and just to be comfortable.  I

did review with him I was not quite sure what finally transpired with the Liver 

biopsy I knew that the hospitalist was in making arrangements around that.  I 

was happy to clarify.  We did discuss to routes, going home and focusing on 

comfort, with hospice.  This would mean no biopsy, or follow-up with oncology.  

We did discuss in the context of this information if she would make a different 

choice, he did not think so.  Counseling provided regarding the continuum of 

care and the hospice benefit.  After much discussion and clarification, he feels

that patient would choose to go home with hospice.  Agreed we go back to the 

room and clarify this. 





15:45 Follow-up with hospitalist, they can do the liver biopsy tomorrow, were 

able to downgrade her concerns around anesthesiology support.  It is still a 

possibility, risk is still there, but not as high as originally presented to 

patient and family.





Followed up with family, did review with patient and , possibility of 

still having liver biopsy, patient was actually quite clear she did not want 

liver biopsy.  We did discuss it is still an option and she can still go home on

hospice, she does not feel like she would make a different decision with this 

plan as she does not feel like it would have a good outcome she "had a bad 

feeling about it".  Did contact hospice about admitting, patient would like to 

get home as soon as possible, but would need hospital bed and commode.  They 

will try and facilitate getting equipment, and in evening admit if not can admit

the next day.  This was shared with family.  Clarified any further follow-up 

questions.  Did provide them the book hard choices for loving people.











Results





- Lab Results


Lab results reviewed: Yes


Fish Bones: 


                                 23 05:33





                                 23 05:33


Lab and Imaging Results: 





                               Lab Results x24hrs











  23 Range/Units





  11:30 07:38 05:33 


 


WBC     (4.8-10.8)  x10^3/uL


 


RBC     (4.20-5.40)  10^6/uL


 


Hgb     (12.0-16.0)  g/dL


 


Hct     (37.0-47.0)  %


 


MCV     (81.0-99.0)  fL


 


MCH     (27.0-31.0)  pg


 


MCHC     (32.0-36.0)  g/dL


 


RDW     (12.0-15.0)  %


 


Plt Count     (130-450)  10^3/uL


 


MPV     (7.9-10.8)  fL


 


Neut # (Auto)     (1.5-6.6)  10^3/uL


 


Lymph # (Auto)     (1.5-3.5)  10^3/uL


 


Mono # (Auto)     (0.0-1.0)  10^3/uL


 


Eos # (Auto)     (0.0-0.7)  10^3/uL


 


Baso # (Auto)     (0.0-0.1)  10^3/uL


 


Absolute Nucleated RBC     x10^3/uL


 


Nucleated RBC %     /100WBC


 


Sodium    142  (135-145)  mmol/L


 


Potassium    4.3  (3.5-5.0)  mmol/L


 


Chloride    92 L  (101-111)  mmol/L


 


Carbon Dioxide    38 H  (21-32)  mmol/L


 


Anion Gap    12.0  (6-13)  


 


BUN    20  (6-20)  mg/dL


 


Creatinine    0.7  (0.4-1.0)  mg/dL


 


Estimated GFR (MDRD)    82 L  (>89)  


 


Glucose    160 H  ()  mg/dL


 


POC Whole Bld Glucose  180 H  132 H   (70 - 100)  mg/dL


 


Calcium    8.6  (8.5-10.3)  mg/dL














  05/16/23 05/15/23 05/15/23 Range/Units





  05:33 20:54 16:34 


 


WBC  15.7 H    (4.8-10.8)  x10^3/uL


 


RBC  3.69 L    (4.20-5.40)  10^6/uL


 


Hgb  11.0 L    (12.0-16.0)  g/dL


 


Hct  35.9 L    (37.0-47.0)  %


 


MCV  97.3    (81.0-99.0)  fL


 


MCH  29.8    (27.0-31.0)  pg


 


MCHC  30.6 L    (32.0-36.0)  g/dL


 


RDW  17.1 H    (12.0-15.0)  %


 


Plt Count  130    (130-450)  10^3/uL


 


MPV  12.0 H    (7.9-10.8)  fL


 


Neut # (Auto)  12.4 H    (1.5-6.6)  10^3/uL


 


Lymph # (Auto)  1.6    (1.5-3.5)  10^3/uL


 


Mono # (Auto)  1.3 H    (0.0-1.0)  10^3/uL


 


Eos # (Auto)  0.0    (0.0-0.7)  10^3/uL


 


Baso # (Auto)  0.0    (0.0-0.1)  10^3/uL


 


Absolute Nucleated RBC  0.05    x10^3/uL


 


Nucleated RBC %  0.3    /100WBC


 


Sodium     (135-145)  mmol/L


 


Potassium     (3.5-5.0)  mmol/L


 


Chloride     (101-111)  mmol/L


 


Carbon Dioxide     (21-32)  mmol/L


 


Anion Gap     (6-13)  


 


BUN     (6-20)  mg/dL


 


Creatinine     (0.4-1.0)  mg/dL


 


Estimated GFR (MDRD)     (>89)  


 


Glucose     ()  mg/dL


 


POC Whole Bld Glucose   173 H  194 H  (70 - 100)  mg/dL


 


Calcium     (8.5-10.3)  mg/dL














Impression and Recommendations





- Palliative Care


Impression: 


This is a 73-year-old woman who presents unexpectedly with cancer of unknown 

origin with metastatic disease to the liver, Suspicious for pancreatic cancer, 

particularly in the setting of her mother had pancreatic.  Patient remains quite

weak, has anorexia, does have pain that is worsening particular right upper 

quadrant.Palliative care seeing patient for goals of care conversation, patient 

will be transitioning to hospice.





Recommendations/Counseling Done: 


1. Reviewed with care my pain of neoplastic origin.  Patient had been on 

methadone 5 mg twice daily for chronic pain.  This was not effective for her 

malignant pain, she has responded well to time-released morphine 30 mg twice 

daily, using immediate release 7.5 mg for breakthrough pain.  Would recommend 

continuing current regimen and titrate accordingly.


2. Cancer of unknown origin with metastatic disease, most likely liver mets.  

Patient most likely with pancreatic cancer, after much discussion as far as 

goals of care, patient would not make any different choices with biopsy, though 

they were going to be able to accommodate her tomorrow.  Patient and family in 

support of transitioning to comfort focused care, hospice referral made and 

facilitated.


3, Goals of care.  Multiple family meetings, as well as coordination of care 

with hospitalist, MSW, and hospice team.  Patient to transition to hospice, 

patient is quite anxious to get home, her priority is spending time with family,

focus on comfort, and allowing natural death.  Family is supportive these goals,

counseling provided along the continuum of care, patient with need of hospital 

bed and commode.  Pending hospice transition, patient will need ambulance 

transfer.  Patient does have a POLST with DN AR/DNI and selective treatments.





85 Minutes, review of chart, labs, family meeting multiple times, coordination 

of care with hospitalist and hospice team, counseling with patient and family 

for continuum of care and goals of care.

## 2023-05-17 ENCOUNTER — HOSPITAL ENCOUNTER (OUTPATIENT)
Dept: HOSPITAL 76 - EMS | Age: 73
Discharge: HOSPICE | End: 2023-05-17
Attending: INTERNAL MEDICINE
Payer: MEDICARE

## 2023-05-17 VITALS — DIASTOLIC BLOOD PRESSURE: 72 MMHG | SYSTOLIC BLOOD PRESSURE: 153 MMHG

## 2023-05-17 DIAGNOSIS — C80.1: ICD-10-CM

## 2023-05-17 DIAGNOSIS — R53.1: ICD-10-CM

## 2023-05-17 DIAGNOSIS — Z51.5: Primary | ICD-10-CM

## 2023-05-17 DIAGNOSIS — R41.82: ICD-10-CM

## 2023-05-17 DIAGNOSIS — Z74.01: ICD-10-CM

## 2023-05-17 RX ADMIN — DOCUSATE SODIUM SCH MG: 250 CAPSULE, LIQUID FILLED ORAL at 09:00

## 2023-05-17 RX ADMIN — Medication SCH MCG: at 08:59

## 2023-05-17 RX ADMIN — SODIUM CHLORIDE, PRESERVATIVE FREE SCH ML: 5 INJECTION INTRAVENOUS at 09:02

## 2023-05-17 RX ADMIN — Medication SCH MG: at 09:01

## 2023-05-17 RX ADMIN — MORPHINE SULFATE PRN MG: 15 TABLET ORAL at 13:12

## 2023-05-17 RX ADMIN — MIDODRINE HYDROCHLORIDE SCH MG: 2.5 TABLET ORAL at 08:59

## 2023-05-17 RX ADMIN — INSULIN LISPRO SCH: 100 INJECTION, SOLUTION INTRAVENOUS; SUBCUTANEOUS at 12:42

## 2023-05-17 RX ADMIN — NYSTATIN SCH APPLIC: 100000 POWDER TOPICAL at 09:06

## 2023-05-17 RX ADMIN — PREGABALIN SCH MG: 100 CAPSULE ORAL at 09:00

## 2023-05-17 RX ADMIN — MORPHINE SULFATE SCH MG: 30 TABLET, FILM COATED, EXTENDED RELEASE ORAL at 09:00

## 2023-05-17 RX ADMIN — INSULIN LISPRO SCH: 100 INJECTION, SOLUTION INTRAVENOUS; SUBCUTANEOUS at 09:01

## 2023-05-17 RX ADMIN — Medication SCH TAB: at 08:59

## 2023-05-17 RX ADMIN — MORPHINE SULFATE PRN MG: 15 TABLET ORAL at 05:05

## 2023-05-17 RX ADMIN — MIDODRINE HYDROCHLORIDE SCH MG: 2.5 TABLET ORAL at 13:12

## 2023-05-17 RX ADMIN — SODIUM CHLORIDE, PRESERVATIVE FREE SCH ML: 5 INJECTION INTRAVENOUS at 00:40

## 2023-05-17 RX ADMIN — LEVOTHYROXINE SODIUM SCH MCG: 75 TABLET ORAL at 05:05

## 2023-05-17 RX ADMIN — FAMOTIDINE SCH MG: 20 TABLET, FILM COATED ORAL at 08:59

## 2023-05-17 NOTE — DISCHARGE PLAN
Discharge Plan


Problem Reviewed?: Yes


Disposition: 50 Hospice/Home DC/Xfer


Condition: Serious


Prescriptions: 


Morphine Ir [Ms Ir] 7.5 mg PO Q6HR PRN #15 tab


 PRN Reason: BREAKTHROUGH Pain (Level 1-10)


dexAMETHasone [Decadron] 2 mg PO BID #10 tab


Morphine Sulfate ER [Ms Contin] 30 mg PO BID #30 tab


Nystatin [Nystop] 1 applic TOP BID #1 each


ONDANSETRON ODT Prepack 2 [ZOFRAN ODT Prepack 2] 4 mg TL Q6H #10 tablet


Diet: Regular


Activity Restrictions: Activity as Tolerated


Shower Restrictions: No


Driving Restrictions: Yes


Assistance Devices: Walker


Health Concerns: 


You were hospitalized to manage weakness and abdominal pain, caused by a new 

finding of multiple liver masses.  You had an elevated INR from Coumadin use, 

which was corrected.  The plan was to get a biopsy of one of the liver masses to

determine what the cancer primary is.  Just yesterday you decided not to have 

the biopsy and requested to not have treatment of any cancer and to go home 

under Hospice care.  You are being discharged home today.


You may resume your previously prescribed medications if you like.  New 

prescriptions were ordered for pain control, anxiety and other symptoms.  All 

new prescriptions were sent to your [facility] pharmacy in [place]. If you have 

questions with your medical care going forward, please [facility]aki, who is now responsible for your care; it is not 

your previous Primary Care Provider.





Plan of Treatment: 


As above.





Care Goals: 


Comfort and dying with dignity are the goals.





Assessment: 


The patient understands and is agreeable with the plan.





Follow-Up Care: Hospice


No Smoking: If you smoke, Please STOP!  Call [phone] for help.


Follow-up with: 


Sayda Maki MD [Provider Admit Priv/Credential] -

## 2023-05-17 NOTE — DISCHARGE SUMMARY
Discharge Summary


Admit Date: 05/07/23


Discharge Date: 05/17/23


Discharging Provider: Dr Lazara Arreola


Primary Care Provider: DELORES Oneill


Code Status: Do Not Attempt Resuscitation


Condition at Discharge: Serious


Discharge Disposition: 50 Hospice/Home DC/Xfer





- HPI


History of Present Illness: 





This is a 72-year-old female with history of strokes, septal defect on lifelong 

warfarin, BOBBI on CPAP, type 2 diabetes who presented to the ED for the evaluat

ion of abdominal pain and altered mental status. She was brought in by a private

vehicle accompanied by her . He provided all the history because pt is 

fairly encephalopathic when she presented to the ED.  He states that about 4 to 

5 days ago she started to complain of diarrhea and abdominal pain. Had a fever 

at the beginning but not in the last couple of days. Today she became 

progressively confused and has not been talking in the last few hours PTA. In 

the ED, pt was found to have BEN, INR above 10, transaminitis, CT abd/pelvis 

showed possible mets to liver vs hepatocellular malignancy vs cirrhosis, CT head

showed no acute process, UA/CXR negative for infection. For AMS, BEN, liver 

masses and elevated INR, Hospitalist was asked to admit the pt for further 

management.








- HOSPITAL COURSE


Hospital Course: 





(1) Liver masses


Her CT scan then the ultrasound done  showed "innumerable" masses in the liver. 

There was one particularly large mass measuring 6.2 cm in the right posterior 

lobe. The ED provider was the first to tell the patient and  that she 

probably has cancer. We had planned to get tissue sample with a liver biopsy for

determing pathology.  Interventional Radiology needed the INR to be corrected 

first. She got FFP and vitamin K doses. Then we spoke to Oncology in the MAC on 

5/9. If they get a tissue diagnosis, there were therapies that would allow her a

decent quality of life and extended her life a few years.  The pt and family at 

first felt it was worth getting a liver biopsy and getting a tissue diagnosis.


We also spoke to the patient about CODE STATUS. While she was willing to get a 

biopsy, and get treatment, she did not want full resuscitative measures with CPR

and intubation. So CODE STATUS was changed to DO NOT RESUSCITATE.


We were anticipating the liver biopsy by IR for several days, but the DI 

sedation RN felt the pt needed Anesthesia, due to ASA class of 3 or more. Anesth

[facility] does not put IR cases on their schedule.  So this needed to be coordinated 

and determined  if she needed to have the biopsy done at a different facility. 

Palliative care consult was requested and Kathia Mcclain NP saw her.  At this 

point the patient decided that she did not want to go through with the liver 

biopsy because of the risk of liver laceration. The patient decided to not have 

any biopsy, any treatment, but to go home with Hospice care and this was done.





(2) RUQ abdominal pain


The presumption was metastatic liver disease with unknown primary.  There is no

dularity along the capsule as well as the parenchyma.  Gallbladder is 

unremarkable.  No hydronephrosis. Abdominal ultrasound also shows innumerable 

hypoechoic mass lesions.  A representative large complex mass in the posterior 

right hepatic lobe measures 6.2 cm.  The intrahepatic ducts appear nondilated.  

There is dilation of the extrahepatic duct with the common bile duct 1 cm. 


IV narcotic pushes were started but pt needed a PCA pump. Patient was discharged

home with hospice care, narcotics for pain were ordered





(3) Transaminitis


The patient gets yearly blood tests, which were done in October 2022 and the 

LFTs were normal then. Her total bili was 1.7 on admission. AST was 206 on 

admission and decreased to 66.  ALT was 74 on admission and decreased to 45.  

Alk phos was 311>>220.





(4) DM II


The patient took metformin.  The  admitted she does not really follow a 

diabetic diet. Our treatment consisted of fingerstick checks and sliding scale 

insulin coverage, and a low glu diet. A1c was 7.1%.  Comfort food was allowed at

the time of discharge





(5) Chronic pain


There is a history of previously needing to be on oxycodone at very high doses 

which was then transitioned to methadone.  Her chronic pain was in her neck and 

lower back. 





(6) Hypothyroidism


Her TSH was elevated but free T4 was normal. We continued her home thyroid 

replacement dose





(7)  BOBBI


Her home CPAP machine was brought here. 





(8) Hx of CVA


Patient has had 3 strokes, 2003, 2013 and 2014.  The chart indicates she has an 

"intracardiac shunt" but does not say if atrial or ventricular. She has chronic 

weakness on one side.  She uses walker. She has been on chronic warfarin since 

those strokes. She came in with an INR of 10, probably due to taking her meds w

hile liver abnormalities are present.  At the time of discharge her INR was 

normal.  She was given the option to resume her Coumadin or not





(9) Hypotension


She was transferred to the ICU May 9 for her hypotension. She did not appear to 

have signs and symptoms of sepsis or a GI bleed.  Treatment consisted of 

Levophed to keep mean arterial pressure at 65. CT abd/pelvis imaging was 

repeated. Since infection could not be completely ruled out,  we started her on 

broad-spectrum antibiotics with cefepime, vancomycin, and Flagyl. A blood 

cultures turned pos but was a contaminant. By 5/12, she was off of IV Levophed 

and moved out of the ICU.





(10) Supratherapeutic INR


She was admitted with an INR of greater than 10. She was taking her Coumadin in 

spite of not having any p.o. intake for 4 days, and her liver was being replaced

by cancer. It took several days for the INR level to come below 1.5,  with our 

treatments, in order to have a liver biopsy.  But at that point she decided not 

to undergo the liver biopsy.  She was discharged with the option of going back 

on Coumadin or not resuming it.





(11) Altered mental status resolved


She was obtunded at admission then remained fatigued and very weak, but was 

oriented to person place and time. Most likely her altered mental status was 

from the dehydration and the pain of liver mets. She was mostly in bed while 

here and lost a lot of ground. At discharge, we ordered home health OT PT nurse 

and bath aide 





(12) Acute kidney injury 


BUN/creatinine on admission were 41/2.3. She normally runs a creatinine of 0.8. 

With her history of 4 days of poor oral intake, she was very dehydrated and this

caused her BEN.  Her lisinopril was stopped, and BUN/creat eventually normalized

to 13/0.7 








- ALLERGIES


Allergies/Adverse Reactions: 


                                    Allergies











Allergy/AdvReac Type Severity Reaction Status Date / Time


 


No Known Drug Allergies Allergy   Verified 10/17/22 14:56














- MEDICATIONS


Home Medications: 


                                Ambulatory Orders











 Medication  Instructions  Recorded  Confirmed


 


Atorvastatin Calcium 40 mg PO DAILY 02/12/21 05/08/23


 


Furosemide [Lasix] 20 mg PO DAILY 02/12/21 05/08/23


 


Levothyroxine Sodium 150 mcg PO DAILY 02/12/21 05/08/23





[Levothyroxine]   


 


Methadone [Methadone Hcl] 5 mg PO BID 02/12/21 05/08/23


 


Pregabalin [Lyrica] 150 mg PO BID 02/12/21 05/08/23


 


Warfarin [Coumadin] 5 mg PO DAILY 02/12/21 05/08/23


 


amLODIPine [Norvasc] 5 mg PO DAILY 02/12/21 05/08/23


 


tiZANidine [Zanaflex] 8 mg PO QPM 02/12/21 05/08/23


 


Metformin HCl [Metformin  mg PO BID 11/23/21 05/08/23





Osmotic]   


 


Aspirin [Aspirin EC] 1 tab PO DAILY PRN 05/08/23 05/08/23


 


Calcium Citrate/Vitamin D3 2 tab PO DAILY 05/08/23 05/08/23





[Calcium Cit 315-Vit D3 250 Cpt]   


 


Cholecalciferol [Vitamin D3] 1 tab PO DAILY 05/08/23 05/08/23


 


Famotidine [Pepcid] 1 tab PO HS PRN 05/08/23 05/08/23


 


Lisinopril [Zestril] 1 tab PO DAILY 05/08/23 05/08/23


 


Pregabalin 1 cap PO BID 05/08/23 05/08/23


 


Morphine Ir [Ms Ir] 7.5 mg PO Q6HR PRN #15 tab 05/17/23 


 


Morphine Sulfate ER [Ms Contin] 30 mg PO BID #30 tab 05/17/23 


 


Nystatin [Nystop] 1 applic TOP BID #1 each 05/17/23 


 


ONDANSETRON ODT Prepack 2 [ZOFRAN 4 mg TL Q6H #10 tablet 05/17/23 





ODT Prepack 2]   


 


dexAMETHasone [Decadron] 2 mg PO BID #10 tab 05/17/23 














- PHYSICAL EXAM AT DISCHARGE


General Appearance: positive: Moderate distress (Lethargic and weak, in some 

pain from the abdomen)


Eyes Bilateral: positive: No lid inflammation, Other (Appears tired)


ENT: positive: No signs of dehydration


Neck: positive: Nml inspection


Respiratory: positive: No respiratory distress


Cardiovascular: positive: Regular rate & rhythm


Abdomen: positive: Other (Mildly distended, cannot rule out a fluid wave, mildly

 tender)


Skin: positive: Warm, Dry


Neurologic/Psychiatric: positive: Oriented x3 (Extremely weak)





- LABS


Result Diagrams: 


                                 05/16/23 05:33





                                 05/16/23 05:33





- DIAGNOSTIC IMAGING


Diagnostic Imaging Results: Final report reviewed





- FOLLOW UP


Follow Up: 





The patient was sent home with plan for comfort care and dying with dignity, 

under the care of the Hospice team and Dr. Maki.








- TIME SPENT


Time Spent in Discharge (Minutes): 45